# Patient Record
Sex: FEMALE | Race: WHITE | NOT HISPANIC OR LATINO | Employment: OTHER | URBAN - METROPOLITAN AREA
[De-identification: names, ages, dates, MRNs, and addresses within clinical notes are randomized per-mention and may not be internally consistent; named-entity substitution may affect disease eponyms.]

---

## 2017-01-09 ENCOUNTER — E-VISIT (OUTPATIENT)
Dept: FAMILY MEDICINE | Facility: CLINIC | Age: 57
End: 2017-01-09
Payer: COMMERCIAL

## 2017-01-09 DIAGNOSIS — R30.0 DYSURIA: Primary | ICD-10-CM

## 2017-01-09 PROCEDURE — 99444 ZZC PHYSICIAN ONLINE EVALUATION & MANAGEMENT SERVICE: CPT | Performed by: FAMILY MEDICINE

## 2017-01-09 RX ORDER — SULFAMETHOXAZOLE/TRIMETHOPRIM 800-160 MG
1 TABLET ORAL 2 TIMES DAILY
Qty: 6 TABLET | Refills: 0 | Status: SHIPPED | OUTPATIENT
Start: 2017-01-09 | End: 2017-01-12

## 2017-02-20 ENCOUNTER — E-VISIT (OUTPATIENT)
Dept: FAMILY MEDICINE | Facility: CLINIC | Age: 57
End: 2017-02-20
Payer: COMMERCIAL

## 2017-02-20 DIAGNOSIS — R30.0 DYSURIA: Primary | ICD-10-CM

## 2017-02-20 PROCEDURE — 99444 ZZC PHYSICIAN ONLINE EVALUATION & MANAGEMENT SERVICE: CPT | Performed by: FAMILY MEDICINE

## 2017-02-21 DIAGNOSIS — R30.0 DYSURIA: ICD-10-CM

## 2017-02-21 LAB
ALBUMIN UR-MCNC: NEGATIVE MG/DL
APPEARANCE UR: CLEAR
BILIRUB UR QL STRIP: NEGATIVE
COLOR UR AUTO: YELLOW
GLUCOSE UR STRIP-MCNC: NEGATIVE MG/DL
HGB UR QL STRIP: ABNORMAL
KETONES UR STRIP-MCNC: NEGATIVE MG/DL
LEUKOCYTE ESTERASE UR QL STRIP: NEGATIVE
NITRATE UR QL: NEGATIVE
PH UR STRIP: 6 PH (ref 5–7)
RBC #/AREA URNS AUTO: NORMAL /HPF (ref 0–2)
SP GR UR STRIP: <=1.005 (ref 1–1.03)
URN SPEC COLLECT METH UR: ABNORMAL
UROBILINOGEN UR STRIP-ACNC: 0.2 EU/DL (ref 0.2–1)
WBC #/AREA URNS AUTO: NORMAL /HPF (ref 0–2)

## 2017-02-21 PROCEDURE — 81001 URINALYSIS AUTO W/SCOPE: CPT | Performed by: FAMILY MEDICINE

## 2017-02-21 PROCEDURE — 87086 URINE CULTURE/COLONY COUNT: CPT | Performed by: FAMILY MEDICINE

## 2017-02-21 RX ORDER — NITROFURANTOIN 25; 75 MG/1; MG/1
100 CAPSULE ORAL 2 TIMES DAILY
Qty: 14 CAPSULE | Refills: 0 | Status: SHIPPED | OUTPATIENT
Start: 2017-02-21 | End: 2017-05-10

## 2017-02-24 LAB
BACTERIA SPEC CULT: NORMAL
MICRO REPORT STATUS: NORMAL
SPECIMEN SOURCE: NORMAL

## 2017-05-08 ENCOUNTER — TELEPHONE (OUTPATIENT)
Dept: FAMILY MEDICINE | Facility: CLINIC | Age: 57
End: 2017-05-08

## 2017-05-08 NOTE — TELEPHONE ENCOUNTER
RN Tickbite Protocol: Ages 8 and older  Stephanie Trejo is a 56 year old female is being assessed for indication of tick bite.     ASSESSMENT/PLAN:   1.  Patient instructions without treatment.   2.  Education: How to identify a deer tick, Important time frames related to tick bite and Preventing tick bites  3.  Follow-up:   4.  Patient/parent verbalized understanding of plan and is agreeable.     SUBJECTIVE/OBJECTIVE:  Removal of tick that has been attached at least 36 hours? yes   Tick is smaller, redder, no white striping on back and more triangular in shape? yes   Tick was removed within the last 72 hours? yes   Location on body of suspected tick bite: arm   Symptoms:  none  Complicating factors:  Reports: none   Denies: Allergy to Doxycycline, Age less than 8, Breast feeding, Pregnant and Greater than 72 hours since tick removed    Encounter handled by: Nurse Triage.    Lisa Alas RN

## 2017-05-08 NOTE — TELEPHONE ENCOUNTER
Reason for Call:  Other call back    Detailed comments: patient is calling stating she pulled a tiny tick off of herself today, got the tick out and the head out, and it was still moving after pulling it. The spot where it was pulled from, is very red and almost blackish, and is painful. Would like to know what to do.    Phone Number Patient can be reached at: Home number on file [phone] (home)    Best Time: any    Can we leave a detailed message on this number? YES   Koki Everett  [facility] [place] [facility]      Call taken on 5/8/2017 at 10:24 AM by Koki Everett

## 2017-05-10 ENCOUNTER — OFFICE VISIT (OUTPATIENT)
Dept: FAMILY MEDICINE | Facility: CLINIC | Age: 57
End: 2017-05-10
Payer: COMMERCIAL

## 2017-05-10 VITALS
DIASTOLIC BLOOD PRESSURE: 68 MMHG | HEART RATE: 52 BPM | SYSTOLIC BLOOD PRESSURE: 128 MMHG | HEIGHT: 71 IN | WEIGHT: 176 LBS | BODY MASS INDEX: 24.64 KG/M2 | TEMPERATURE: 98.3 F

## 2017-05-10 DIAGNOSIS — S70.361A TICK BITE OF THIGH, RIGHT, INITIAL ENCOUNTER: Primary | ICD-10-CM
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
## 2017-05-10 PROCEDURE — 99212 OFFICE O/P EST SF 10 MIN: CPT | Performed by: NURSE PRACTITIONER

## 2017-05-10 NOTE — TELEPHONE ENCOUNTER
Patient is calling back because her tick bite is now black the size of a tack and has a red ring around it. - It hurts - Please advise

## 2017-05-10 NOTE — PATIENT INSTRUCTIONS
Tick Bite (No Abx)    You have been bitten by a tick. Ticks are small insects that feed on the blood of rodents, rabbits, birds, deer, dogs, and humans. The bite may cause a small local reaction like that of a spider, with a small amount of local redness, itching and slight swelling. Sometimes there is no local reaction.  Most tick bites are harmless, but some ticks carry diseases, such as Lyme disease or [place] spotted fever, that can be passed to people at the time of the bite. Lyme disease is of greatest concern. Right now you have no symptoms of Lyme disease or other serious reaction to the bite. It is important to watch for the warning signs, which could appear weeks to months after the tick bite.  Home care  The following will help you care for your bite at home:  1. If itching is a problem, avoid tight clothing and anything that heats up your skin (hot showers/baths, direct sunlight). This will tend to make the itching worse. Use ice packs to reduce redness as well as itching.  2. An ice pack (ice cubes in a plastic bag, wrapped in a towel) will reduce local areas of redness and itching. Creams containing benzocaine (available over-the-counter) will reduce itching.  3. If large areas of the skin are involved, and if no other antihistamine was given, you can use an antihistamine with diphenhydramine, which is available at drug stores or groceries. It may be used to reduce itching if large areas of the skin are involved. If symptoms continue, seek the advice of your doctor or pharmacist about over-the-counter medications.  Follow-up care  Follow up with your doctor or as advised.  When to seek medical care  Get prompt medical attention if any of the following occur:  Signs of local infection (during next few days)    Increasing redness around the bite site    Increased pain or swelling    Fever over 100.4 F (38.0 C)    Fluid draining from the bite area  Signs of tick-related disease (during next few  weeks to months)    Circular red ring-like rash appears at the bite area within 1 to 3 weeks    Tiredness, fever or chills, nausea or vomiting    Neck pain or stiffness, headache, confusion    Muscle, bone aching    Irregular or rapid heart beat    Joint pain or swelling (especially the knee joint)    Numbness or tingling or weakness in the arms or legs    Weakness on one side of the face    4674-0880 [employer]. [address] [place] [address], [place] [id number]. All rights reserved. This information is not intended as a substitute for professional medical care. Always follow your healthcare professional's instructions.

## 2017-05-10 NOTE — PROGRESS NOTES
SUBJECTIVE:                                                    Stephanie Trejo is a 56 year old female who presents to clinic today for the following health issues:      Bug bite       Duration: 3 days     Description (location/character/radiation): left upper leg - was a very small tick     Intensity:  moderate    Accompanying signs and symptoms: red around site     History (similar episodes/previous evaluation): hx of anaplasmosis     Precipitating or alleviating factors: None    Therapies tried and outcome: None     Was outside  afternoon/evening and found tick attached Monday morning.  Likely on for less than 24 hours.  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    Problem list and histories reviewed & adjusted, as indicated.  Additional history: as documented    Patient Active Problem List   Diagnosis     Hypothyroidism     Other and unspecified disc disorder of lumbar region     Essential hypertension     CAMELIA (obstructive sleep apnea)     CARDIOVASCULAR SCREENING; LDL GOAL LESS THAN 100     Mitral valve regurgitation     Anaplasmosis     Atrial fibrillation (H)     Abnormal uterine bleeding     Mild major depression (H)     CHF (congestive heart failure) (H)     Counseling regarding advanced directives     Past Surgical History:   Procedure Laterality Date     BACK SURGERY       COLONOSCOPY       GYN SURGERY       SOFT TISSUE SURGERY       SURGICAL HISTORY OF -            SURGICAL HISTORY OF -       Laparoscopy     SURGICAL HISTORY OF -       Hernia Repair      SURGICAL HISTORY OF -       Back Fusion T3 - L3       Social History   Substance Use Topics     Smoking status: Never Smoker     Smokeless tobacco: Never Used     Alcohol use No     Family History   Problem Relation Age of Onset     Lipids Mother      Thyroid Disease Mother      Eye Disorder Mother      macular degeneration     Colon Cancer Mother      Anesthesia Reaction Mother       "allergic to some     Hypertension Father      Cardiovascular Father      MI     OSTEOPOROSIS Father      Alzheimer Disease Father      Genitourinary Problems Father      incontinence     Breast Cancer Maternal Grandmother      CEREBROVASCULAR DISEASE Maternal Grandfather      DIABETES Paternal Grandmother      GASTROINTESTINAL DISEASE Sister      colitis     Respiratory Daughter      asthma         Current Outpatient Prescriptions   Medication Sig Dispense Refill     Omega-3 Fatty Acids (FISH OIL OMEGA-3 PO)        PSYLLIUM HUSK PO        Magnesium Oxide (MAG-200 PO)        Probiotic Product (PROBIOTIC DAILY PO)        Misc Natural Products (LUTEIN 20 PO)        temazepam (RESTORIL) 15 MG capsule Take 1-2 tablets by mouth 30 minutes before bed.  Start with 1 tablet and safe to increase to 2 tablets after 3-4 days if residual insomnia. (Patient taking differently: Take 1-2 tablets by mouth 30 minutes before bed.  Start with 1 tablet and safe to increase to 2 tablets after 3-4 days if residual insomnia. Pt taking prn) 60 capsule 5     levothyroxine (SYNTHROID, LEVOTHROID) 125 MCG tablet Take 1 tablet (125 mcg) by mouth daily 90 tablet 3     aspirin 81 MG chewable tablet Take 1 tablet by mouth daily. 90 tablet      MULTIVITAMIN/MINERALS PO one tablet daily       Allergies   Allergen Reactions     Amlodipine Besylate Swelling     Swelling in ankles, Racing pulse also     Ammonia Hives and Swelling     Penicillins Rash     Labs reviewed in EPIC    Reviewed and updated as needed this visit by clinical staff       Reviewed and updated as needed this visit by Provider         ROS:  Constitutional, HEENT, cardiovascular, pulmonary, gi and gu systems are negative, except as otherwise noted.    OBJECTIVE:                                                    /68 (Cuff Size: Adult Regular)  Pulse 52  Temp 98.3  F (36.8  C) (Tympanic)  Ht 5' 11\" (1.803 m)  Wt 176 lb (79.8 kg)  LMP 09/21/2012  BMI 24.55 kg/m2  Body mass " index is 24.55 kg/(m^2).  GENERAL: healthy, alert and no distress  SKIN: tick bite right inner thigh with surrounding erythema, no warmth or bullseye rash  PSYCH: mentation appears normal, affect normal/bright    Diagnostic Test Results:  none      ASSESSMENT/PLAN:                                                      1. Tick bite of thigh, right, initial encounter  No evidence of infection and no bulls eye rash.  With exposure less than 24 hours low risk for Lyme transmission.  Stephanie will monitor symptoms carefully and follow up immediately with any worsening symptoms.    Home care instructions were reviewed with the patient. The risks, benefits and treatment options of prescribed medications or other treatments have been discussed with the patient. The patient verbalized their understanding and should call or follow up if no improvement or if they develop further problems.      Patient Instructions     Tick Bite (No Abx)    You have been bitten by a tick. Ticks are small insects that feed on the blood of rodents, rabbits, birds, deer, dogs, and humans. The bite may cause a small local reaction like that of a spider, with a small amount of local redness, itching and slight swelling. Sometimes there is no local reaction.  Most tick bites are harmless, but some ticks carry diseases, such as Lyme disease or [place] spotted fever, that can be passed to people at the time of the bite. Lyme disease is of greatest concern. Right now you have no symptoms of Lyme disease or other serious reaction to the bite. It is important to watch for the warning signs, which could appear weeks to months after the tick bite.  Home care  The following will help you care for your bite at home:  1. If itching is a problem, avoid tight clothing and anything that heats up your skin (hot showers/baths, direct sunlight). This will tend to make the itching worse. Use ice packs to reduce redness as well as itching.  2. An ice pack (ice  cubes in a plastic bag, wrapped in a towel) will reduce local areas of redness and itching. Creams containing benzocaine (available over-the-counter) will reduce itching.  3. If large areas of the skin are involved, and if no other antihistamine was given, you can use an antihistamine with diphenhydramine, which is available at drug stores or groceries. It may be used to reduce itching if large areas of the skin are involved. If symptoms continue, seek the advice of your doctor or pharmacist about over-the-counter medications.  Follow-up care  Follow up with your doctor or as advised.  When to seek medical care  Get prompt medical attention if any of the following occur:  Signs of local infection (during next few days)    Increasing redness around the bite site    Increased pain or swelling    Fever over 100.4 F (38.0 C)    Fluid draining from the bite area  Signs of tick-related disease (during next few weeks to months)    Circular red ring-like rash appears at the bite area within 1 to 3 weeks    Tiredness, fever or chills, nausea or vomiting    Neck pain or stiffness, headache, confusion    Muscle, bone aching    Irregular or rapid heart beat    Joint pain or swelling (especially the knee joint)    Numbness or tingling or weakness in the arms or legs    Weakness on one side of the face    6198-5093 [employer]. [address] [place] [id number]. All rights reserved. This information is not intended as a substitute for professional medical care. Always follow your healthcare professional's instructions.            [facility]

## 2017-05-10 NOTE — NURSING NOTE
"Chief Complaint   Patient presents with     Insect Bites       Initial /68 (Cuff Size: Adult Regular)  Pulse 52  Temp 98.3  F (36.8  C) (Tympanic)  Ht 5' 11\" (1.803 m)  Wt 176 lb (79.8 kg)  LMP 09/21/2012  BMI 24.55 kg/m2 Estimated body mass index is 24.55 kg/(m^2) as calculated from the following:    Height as of this encounter: 5' 11\" (1.803 m).    Weight as of this encounter: 176 lb (79.8 kg).  Medication Reconciliation: complete    Health Maintenance that is potentially due pending provider review:  PHQ9    n/a      "

## 2017-05-10 NOTE — MR AVS SNAPSHOT
After Visit Summary   5/10/2017    Stephanie Trejo    MRN: [id number]           Patient Information     Date Of Birth          1960        Visit Information        Provider Department      5/10/2017 2:40 PM Mckenzie Fowler APRN [facility]        Care Instructions      Tick Bite (No Abx)    You have been bitten by a tick. Ticks are small insects that feed on the blood of rodents, rabbits, birds, deer, dogs, and humans. The bite may cause a small local reaction like that of a spider, with a small amount of local redness, itching and slight swelling. Sometimes there is no local reaction.  Most tick bites are harmless, but some ticks carry diseases, such as Lyme disease or [place] spotted fever, that can be passed to people at the time of the bite. Lyme disease is of greatest concern. Right now you have no symptoms of Lyme disease or other serious reaction to the bite. It is important to watch for the warning signs, which could appear weeks to months after the tick bite.  Home care  The following will help you care for your bite at home:  1. If itching is a problem, avoid tight clothing and anything that heats up your skin (hot showers/baths, direct sunlight). This will tend to make the itching worse. Use ice packs to reduce redness as well as itching.  2. An ice pack (ice cubes in a plastic bag, wrapped in a towel) will reduce local areas of redness and itching. Creams containing benzocaine (available over-the-counter) will reduce itching.  3. If large areas of the skin are involved, and if no other antihistamine was given, you can use an antihistamine with diphenhydramine, which is available at drug stores or groceries. It may be used to reduce itching if large areas of the skin are involved. If symptoms continue, seek the advice of your doctor or pharmacist about over-the-counter medications.  Follow-up care  Follow up with your doctor or as advised.  When to  seek medical care  Get prompt medical attention if any of the following occur:  Signs of local infection (during next few days)    Increasing redness around the bite site    Increased pain or swelling    Fever over 100.4 F (38.0 C)    Fluid draining from the bite area  Signs of tick-related disease (during next few weeks to months)    Circular red ring-like rash appears at the bite area within 1 to 3 weeks    Tiredness, fever or chills, nausea or vomiting    Neck pain or stiffness, headache, confusion    Muscle, bone aching    Irregular or rapid heart beat    Joint pain or swelling (especially the knee joint)    Numbness or tingling or weakness in the arms or legs    Weakness on one side of the face    6293-1347 [employer]. [address] [place] [address], [place] [address]. All rights reserved. This information is not intended as a substitute for professional medical care. Always follow your healthcare professional's instructions.              Follow-ups after your visit        Who to contact     If you have questions or need follow up information about today's clinic visit or your schedule please contact [facility] directly at [phone].  Normal or non-critical lab and imaging results will be communicated to you by [email or web address], letter or phone within 4 business days after the clinic has received the results. If you do not hear from us within 7 days, please contact the clinic through [employer] or phone. If you have a critical or abnormal lab result, we will notify you by phone as soon as possible.  Submit refill requests through [employer] or call your pharmacy and they will forward the refill request to us. Please allow 3 business days for your refill to be completed.          Additional Information About Your Visit        [employer] Information     [employer] gives you secure access to your electronic health record. If you see a primary care provider, you can also send messages to your care team and  "make appointments. If you have questions, please call your primary care clinic.  If you do not have a primary care provider, please call [phone] and they will assist you.        Care EveryWhere ID     This is your Care EveryWhere ID. This could be used by other organizations to access your [facility] medical records  [id number]        Your Vitals Were     Pulse Temperature Height Last Period BMI (Body Mass Index)       52 98.3  F (36.8  C) (Tympanic) 5' 11\" (1.803 m) 09/21/2012 24.55 kg/m2        Blood Pressure from Last 3 Encounters:   05/10/17 128/68   12/08/16 160/90   11/29/16 130/86    Weight from Last 3 Encounters:   05/10/17 176 lb (79.8 kg)   12/08/16 194 lb (88 kg)   11/29/16 194 lb 6.4 oz (88.2 kg)              Today, you had the following     No orders found for display         Today's Medication Changes          These changes are accurate as of: 5/10/17  3:21 PM.  If you have any questions, ask your nurse or doctor.               These medicines have changed or have updated prescriptions.        Dose/Directions    temazepam 15 MG capsule   Commonly known as:  RESTORIL   This may have changed:  additional instructions   Used for:  Psychophysiological insomnia, Anxiety        Take 1-2 tablets by mouth 30 minutes before bed.  Start with 1 tablet and safe to increase to 2 tablets after 3-4 days if residual insomnia.   Quantity:  60 capsule   Refills:  5         Stop taking these medicines if you haven't already. Please contact your care team if you have questions.     nitrofurantoin (macrocrystal-monohydrate) 100 MG capsule   Commonly known as:  MACROBID   Stopped by:  Mckenzie Fowler APRN CNP                    Primary Care Provider Office Phone # Fax #    Radha Felton [phone]       [facility] [address] [address]        Thank you!     Thank you for choosing [facility]  for your care. Our goal is always to provide " you with excellent care. Hearing back from our patients is one way we can continue to improve our services. Please take a few minutes to complete the written survey that you may receive in the mail after your visit with us. Thank you!             Your Updated Medication List - Protect others around you: Learn how to safely use, store and throw away your medicines at www.disposemymeds.org.          This list is accurate as of: 5/10/17  3:21 PM.  Always use your most recent med list.                   Brand Name Dispense Instructions for use    aspirin 81 MG chewable tablet     90 tablet    Take 1 tablet by mouth daily.       FISH OIL OMEGA-3 PO          levothyroxine 125 MCG tablet    SYNTHROID/LEVOTHROID    90 tablet    Take 1 tablet (125 mcg) by mouth daily       LUTEIN 20 PO          MAG-200 PO          MULTIVITAMIN/MINERALS PO      one tablet daily       PROBIOTIC DAILY PO          PSYLLIUM HUSK PO          temazepam 15 MG capsule    RESTORIL    60 capsule    Take 1-2 tablets by mouth 30 minutes before bed.  Start with 1 tablet and safe to increase to 2 tablets after 3-4 days if residual insomnia.

## 2017-09-18 DIAGNOSIS — F41.9 ANXIETY: ICD-10-CM

## 2017-09-18 DIAGNOSIS — F51.04 PSYCHOPHYSIOLOGICAL INSOMNIA: ICD-10-CM

## 2017-09-18 RX ORDER — TEMAZEPAM 15 MG/1
CAPSULE ORAL
Qty: 60 CAPSULE | Refills: 5 | Status: SHIPPED | OUTPATIENT
Start: 2017-09-18 | End: 2017-11-27

## 2017-09-18 NOTE — TELEPHONE ENCOUNTER
Restoril      Last Written Prescription Date:  12/08/2016  Last Fill Quantity: 60,   # refills: 5  Last Office Visit with G, P or [facility] prescribing provider: 12/08/2016  Future Office visit:       Routing refill request to provider for review/approval because:  Controlled medication

## 2017-11-04 ENCOUNTER — RADIANT APPOINTMENT (OUTPATIENT)
Dept: MAMMOGRAPHY | Facility: CLINIC | Age: 57
End: 2017-11-04
Attending: FAMILY MEDICINE
Payer: COMMERCIAL

## 2017-11-04 DIAGNOSIS — Z12.31 VISIT FOR SCREENING MAMMOGRAM: ICD-10-CM

## 2017-11-04 PROCEDURE — G0202 SCR MAMMO BI INCL CAD: HCPCS | Mod: TC

## 2017-11-13 ENCOUNTER — MYC MEDICAL ADVICE (OUTPATIENT)
Dept: FAMILY MEDICINE | Facility: CLINIC | Age: 57
End: 2017-11-13

## 2017-11-13 DIAGNOSIS — F33.0 MAJOR DEPRESSIVE DISORDER, RECURRENT EPISODE, MILD (H): ICD-10-CM

## 2017-11-13 RX ORDER — BUPROPION HYDROCHLORIDE 150 MG/1
300 TABLET ORAL EVERY MORNING
Qty: 180 TABLET | Refills: 0 | Status: SHIPPED | OUTPATIENT
Start: 2017-11-13 | End: 2017-11-27 | Stop reason: DRUGHIGH

## 2017-11-20 ENCOUNTER — OFFICE VISIT (OUTPATIENT)
Dept: FAMILY MEDICINE | Facility: CLINIC | Age: 57
End: 2017-11-20
Payer: COMMERCIAL

## 2017-11-20 ENCOUNTER — RADIANT APPOINTMENT (OUTPATIENT)
Dept: GENERAL RADIOLOGY | Facility: CLINIC | Age: 57
End: 2017-11-20
Attending: NURSE PRACTITIONER
Payer: COMMERCIAL

## 2017-11-20 VITALS
SYSTOLIC BLOOD PRESSURE: 138 MMHG | WEIGHT: 188 LBS | HEART RATE: 64 BPM | TEMPERATURE: 96.9 F | OXYGEN SATURATION: 96 % | DIASTOLIC BLOOD PRESSURE: 86 MMHG | RESPIRATION RATE: 20 BRPM | BODY MASS INDEX: 26.22 KG/M2

## 2017-11-20 DIAGNOSIS — R05.9 COUGH: ICD-10-CM

## 2017-11-20 DIAGNOSIS — J01.90 ACUTE SINUSITIS WITH SYMPTOMS > 10 DAYS: Primary | ICD-10-CM

## 2017-11-20 PROCEDURE — 71020 XR CHEST 2 VW: CPT

## 2017-11-20 PROCEDURE — 99213 OFFICE O/P EST LOW 20 MIN: CPT | Performed by: NURSE PRACTITIONER

## 2017-11-20 RX ORDER — LEVOFLOXACIN 500 MG/1
500 TABLET, FILM COATED ORAL DAILY
Qty: 7 TABLET | Refills: 0 | Status: SHIPPED | OUTPATIENT
Start: 2017-11-20 | End: 2017-11-27

## 2017-11-20 ASSESSMENT — PAIN SCALES - GENERAL: PAINLEVEL: NO PAIN (0)

## 2017-11-20 NOTE — MR AVS SNAPSHOT
After Visit Summary   11/20/2017    Stephanie Trejo    MRN: [id number]           Patient Information     Date Of Birth          1960        Visit Information        Provider Department      11/20/2017 1:00 PM Zeenat Taylor APRN CNP [facility]        Today's Diagnoses     Cough    -  1    Acute sinusitis with symptoms > 10 days          Care Instructions    Chest x-ray shows no acute infection - will await final radiologist read and update you on any changes     Start Levaquin daily for 7 days     Stop multivitamins or take a few hours apart from antibiotics     Push fluids - lots of rest     Ibuprofen or Tylenol for fever or discomfort    Keep appointment with Dr. Felton or come in sooner if not feeling improvement   Sinusitis (Antibiotic Treatment)    The sinuses are air-filled spaces within the bones of the face. They connect to the inside of the nose. Sinusitis is an inflammation of the tissue lining the sinus cavity. Sinus inflammation can occur during a cold. It can also be due to allergies to pollens and other particles in the air. Sinusitis can cause symptoms of sinus congestion and fullness. A sinus infection causes fever, headache and facial pain. There is often green or yellow drainage from the nose or into the back of the throat (post-nasal drip). You have been given antibiotics to treat this condition.  Home care:    Take the full course of antibiotics as instructed. Do not stop taking them, even if you feel better.    Drink plenty of water, hot tea, and other liquids. This may help thin mucus. It also may promote sinus drainage.    Heat may help soothe painful areas of the face. Use a towel soaked in hot water. Or,  the shower and direct the hot spray onto your face. Using a vaporizer along with a menthol rub at night may also help.     An expectorant containing guaifenesin may help thin the mucus and promote drainage from the  sinuses.    Over-the-counter decongestants may be used unless a similar medicine was prescribed. Nasal sprays work the fastest. Use one that contains phenylephrine or oxymetazoline. First blow the nose gently. Then use the spray. Do not use these medicines more often than directed on the label or symptoms may get worse. You may also use tablets containing pseudoephedrine. Avoid products that combine ingredients, because side effects may be increased. Read labels. You can also ask the pharmacist for help. (NOTE: Persons with high blood pressure should not use decongestants. They can raise blood pressure.)    Over-the-counter antihistamines may help if allergies contributed to your sinusitis.      Do not use nasal rinses or irrigation during an acute sinus infection, unless told to by your health care provider. Rinsing may spread the infection to other sinuses.    Use acetaminophen or ibuprofen to control pain, unless another pain medicine was prescribed. (If you have chronic liver or kidney disease or ever had a stomach ulcer, talk with your doctor before using these medicines. Aspirin should never be used in anyone under 18 years of age who is ill with a fever. It may cause severe liver damage.)    Don't smoke. This can worsen symptoms.  Follow-up care  Follow up with your healthcare provider or our staff if you are not improving within the next week.  When to seek medical advice  Call your healthcare provider if any of these occur:    Facial pain or headache becoming more severe    Stiff neck    Unusual drowsiness or confusion    Swelling of the forehead or eyelids    Vision problems, including blurred or double vision    Fever of 100.4 F (38 C) or higher, or as directed by your healthcare provider    Seizure    Breathing problems    Symptoms not resolving within 10 days  Date Last Reviewed: 4/13/2015 2000-2017 [employer]. [address] [place] [address], [place] [address]. All rights reserved. This  information is not intended as a substitute for professional medical care. Always follow your healthcare professional's instructions.                Follow-ups after your visit        Your next 10 appointments already scheduled     Nov 27, 2017  9:40 AM CST   [facility]    [address] [place] [id number]   [phone]              Who to contact     If you have questions or need follow up information about today's clinic visit or your schedule please contact [facility] directly at [phone].  Normal or non-critical lab and imaging results will be communicated to you by [employer], letter or phone within 4 business days after the clinic has received the results. If you do not hear from us within 7 days, please contact the clinic through [employer] or phone. If you have a critical or abnormal lab result, we will notify you by phone as soon as possible.  Submit refill requests through [employer] or call your pharmacy and they will forward the refill request to us. Please allow 3 business days for your refill to be completed.          Additional Information About Your Visit        MyChart Information     [employer] gives you secure access to your electronic health record. If you see a primary care provider, you can also send messages to your care team and make appointments. If you have questions, please call your primary care clinic.  If you do not have a primary care provider, please call [phone] and they will assist you.        Care EveryWhere ID     This is your Care EveryWhere ID. This could be used by other organizations to access your [facility] medical records  [id number]        Your Vitals Were     Pulse Temperature Respirations Last Period Pulse Oximetry BMI (Body Mass Index)    64 96.9  F (36.1  C) (Tympanic) 20 09/21/2012 96% 26.22 kg/m2       Blood Pressure from Last 3 Encounters:   11/20/17  138/86   05/10/17 128/68   12/08/16 160/90    Weight from Last 3 Encounters:   11/20/17 188 lb (85.3 kg)   05/10/17 176 lb (79.8 kg)   12/08/16 194 lb (88 kg)                 Today's Medication Changes          These changes are accurate as of: 11/20/17  2:01 PM.  If you have any questions, ask your nurse or doctor.               Start taking these medicines.        Dose/Directions    levofloxacin 500 MG tablet   Commonly known as:  LEVAQUIN   Used for:  Acute sinusitis with symptoms > 10 days   Started by:  Zeenat Taylor APRN CNP        Dose:  500 mg   Take 1 tablet (500 mg) by mouth daily for 7 days   Quantity:  7 tablet   Refills:  0         These medicines have changed or have updated prescriptions.        Dose/Directions    buPROPion 150 MG 24 hr tablet   Commonly known as:  WELLBUTRIN XL   This may have changed:  additional instructions   Used for:  Major depressive disorder, recurrent episode, mild (H)        Dose:  300 mg   Take 2 tablets (300 mg) by mouth every morning   Quantity:  180 tablet   Refills:  0            Where to get your medicines      These medications were sent to [place] [address]     Phone:  [phone]     levofloxacin 500 MG tablet                Primary Care Provider Office Phone # Fax #    Radha Felton [phone]       [address] [place], [place] [address]        Equal Access to Services     [facility] Center[facility] AH: Hadii giovanna buchanano [place], waaxda luqadaha, qaybta kaalmada adeegyada, virgen avelar. So [facility] [phone].    ATENCIÓN: Si habla español, tiene a ross disposición servicios gratuitos de asistencia lingüística. Llame al [phone].    We comply with applicable federal civil rights laws and [place] laws. We do not discriminate on the basis of race, color, national origin, age, disability, sex, sexual orientation, or gender  identity.            Thank you!     Thank you for choosing [facility]  for your care. Our goal is always to provide you with excellent care. Hearing back from our patients is one way we can continue to improve our services. Please take a few minutes to complete the written survey that you may receive in the mail after your visit with us. Thank you!             Your Updated Medication List - Protect others around you: Learn how to safely use, store and throw away your medicines at www.disposemymeds.org.          This list is accurate as of: 11/20/17  2:01 PM.  Always use your most recent med list.                   Brand Name Dispense Instructions for use Diagnosis    aspirin 81 MG chewable tablet     90 tablet    Take 1 tablet by mouth daily.    Mild major depression (H)       buPROPion 150 MG 24 hr tablet    WELLBUTRIN XL    180 tablet    Take 2 tablets (300 mg) by mouth every morning    Major depressive disorder, recurrent episode, mild (H)       FISH OIL OMEGA-3 PO           levofloxacin 500 MG tablet    LEVAQUIN    7 tablet    Take 1 tablet (500 mg) by mouth daily for 7 days    Acute sinusitis with symptoms > 10 days       levothyroxine 125 MCG tablet    SYNTHROID/LEVOTHROID    90 tablet    Take 1 tablet (125 mcg) by mouth daily    Hypothyroidism, unspecified type       LUTEIN 20 PO           MAG-200 PO           MULTIVITAMIN/MINERALS PO      one tablet daily        PROBIOTIC DAILY PO           PSYLLIUM HUSK PO           temazepam 15 MG capsule    RESTORIL    60 capsule    Take 1-2 tablets by mouth 30 minutes before bed.  Start with 1 tablet and safe to increase to 2 tablets after 3-4 days if residual insomnia. Pt taking prn    Psychophysiological insomnia, Anxiety

## 2017-11-20 NOTE — PATIENT INSTRUCTIONS
Chest x-ray shows no acute infection - will await final radiologist read and update you on any changes     Start Levaquin daily for 7 days     Stop multivitamins or take a few hours apart from antibiotics     Push fluids - lots of rest     Ibuprofen or Tylenol for fever or discomfort    Keep appointment with Dr. Felton or come in sooner if not feeling improvement   Sinusitis (Antibiotic Treatment)    The sinuses are air-filled spaces within the bones of the face. They connect to the inside of the nose. Sinusitis is an inflammation of the tissue lining the sinus cavity. Sinus inflammation can occur during a cold. It can also be due to allergies to pollens and other particles in the air. Sinusitis can cause symptoms of sinus congestion and fullness. A sinus infection causes fever, headache and facial pain. There is often green or yellow drainage from the nose or into the back of the throat (post-nasal drip). You have been given antibiotics to treat this condition.  Home care:    Take the full course of antibiotics as instructed. Do not stop taking them, even if you feel better.    Drink plenty of water, hot tea, and other liquids. This may help thin mucus. It also may promote sinus drainage.    Heat may help soothe painful areas of the face. Use a towel soaked in hot water. Or,  the shower and direct the hot spray onto your face. Using a vaporizer along with a menthol rub at night may also help.     An expectorant containing guaifenesin may help thin the mucus and promote drainage from the sinuses.    Over-the-counter decongestants may be used unless a similar medicine was prescribed. Nasal sprays work the fastest. Use one that contains phenylephrine or oxymetazoline. First blow the nose gently. Then use the spray. Do not use these medicines more often than directed on the label or symptoms may get worse. You may also use tablets containing pseudoephedrine. Avoid products that combine ingredients, because side  effects may be increased. Read labels. You can also ask the pharmacist for help. (NOTE: Persons with high blood pressure should not use decongestants. They can raise blood pressure.)    Over-the-counter antihistamines may help if allergies contributed to your sinusitis.      Do not use nasal rinses or irrigation during an acute sinus infection, unless told to by your health care provider. Rinsing may spread the infection to other sinuses.    Use acetaminophen or ibuprofen to control pain, unless another pain medicine was prescribed. (If you have chronic liver or kidney disease or ever had a stomach ulcer, talk with your doctor before using these medicines. Aspirin should never be used in anyone under 18 years of age who is ill with a fever. It may cause severe liver damage.)    Don't smoke. This can worsen symptoms.  Follow-up care  Follow up with your healthcare provider or our staff if you are not improving within the next week.  When to seek medical advice  Call your healthcare provider if any of these occur:    Facial pain or headache becoming more severe    Stiff neck    Unusual drowsiness or confusion    Swelling of the forehead or eyelids    Vision problems, including blurred or double vision    Fever of 100.4 F (38 C) or higher, or as directed by your healthcare provider    Seizure    Breathing problems    Symptoms not resolving within 10 days  Date Last Reviewed: 4/13/2015 2000-2017 [employer]. [address] [place] [address], [place] [address]. All rights reserved. This information is not intended as a substitute for professional medical care. Always follow your healthcare professional's instructions.

## 2017-11-20 NOTE — NURSING NOTE
"Chief Complaint   Patient presents with     URI       Initial /86 (BP Location: Right arm, Patient Position: Chair, Cuff Size: Adult Regular)  Pulse 64  Temp 96.9  F (36.1  C) (Tympanic)  Resp 20  Wt 188 lb (85.3 kg)  LMP 09/21/2012  SpO2 96%  BMI 26.22 kg/m2 Estimated body mass index is 26.22 kg/(m^2) as calculated from the following:    Height as of 5/10/17: 5' 11\" (1.803 m).    Weight as of this encounter: 188 lb (85.3 kg).  Medication Reconciliation: complete    Health Maintenance that is potentially due pending provider review:  NONE and PHQ9    Ananya Gonzalez MA  1:18 PM 11/20/2017  .        "

## 2017-11-20 NOTE — PROGRESS NOTES
SUBJECTIVE:   Stephanie Trejo is a 57 year old female who presents to clinic today for the following health issues:      ENT Symptoms             Symptoms: cc Present Absent Comment   Fever/Chills   x    Fatigue  x     Muscle Aches  x  Everyday thing    Eye Irritation   x    Sneezing   x    Nasal Kevin/Drg   x    Sinus Pressure/Pain  x     Loss of smell   x    Dental pain   x    Sore Throat  x  From coughing   Swollen Glands   x    Ear Pain/Fullness   x    Cough  x  Dry   Wheeze   x    Chest Pain  x  tightness started last night   Shortness of breath  x     Rash   x    Other         Symptom duration:  2 weeks ago, heaviness yesterday, history of pneumonia    Symptom severity:  moderate, no voice    Treatments tried:  Sudafed - relieves, doesn't take away,    Contacts:  None        Lots of drainage in the back of the throat   Prone to pneumonia - gets almost yearly per patient       Problem list and histories reviewed & adjusted, as indicated.  Additional history: as documented    Patient Active Problem List   Diagnosis     Hypothyroidism     Other and unspecified disc disorder of lumbar region     Essential hypertension     CAMELIA (obstructive sleep apnea)     CARDIOVASCULAR SCREENING; LDL GOAL LESS THAN 100     Mitral valve regurgitation     Anaplasmosis     Atrial fibrillation (H)     Abnormal uterine bleeding     Mild major depression (H)     CHF (congestive heart failure) (H)     Counseling regarding advanced directives     Past Surgical History:   Procedure Laterality Date     BACK SURGERY       COLONOSCOPY       GYN SURGERY       SOFT TISSUE SURGERY       SURGICAL HISTORY OF -            SURGICAL HISTORY OF -       Laparoscopy     SURGICAL HISTORY OF -       Hernia Repair      SURGICAL HISTORY OF -       Back Fusion T3 - L3       Social History   Substance Use Topics     Smoking status: Never Smoker     Smokeless tobacco: Never Used     Alcohol use No     Family History   Problem Relation  Age of Onset     Lipids Mother      Thyroid Disease Mother      Eye Disorder Mother      macular degeneration     Colon Cancer Mother      Anesthesia Reaction Mother      allergic to some     Hypertension Father      Cardiovascular Father      MI     OSTEOPOROSIS Father      Alzheimer Disease Father      Genitourinary Problems Father      incontinence     Breast Cancer Maternal Grandmother      CEREBROVASCULAR DISEASE Maternal Grandfather      DIABETES Paternal Grandmother      GASTROINTESTINAL DISEASE Sister      colitis     Respiratory Daughter      asthma         Current Outpatient Prescriptions   Medication Sig Dispense Refill     levofloxacin (LEVAQUIN) 500 MG tablet Take 1 tablet (500 mg) by mouth daily for 7 days 7 tablet 0     buPROPion (WELLBUTRIN XL) 150 MG 24 hr tablet Take 2 tablets (300 mg) by mouth every morning (Patient taking differently: Take 300 mg by mouth every morning Insurance would only cover 300 mg tabs so pt takes one tab every morning of 300mg.) 180 tablet 0     temazepam (RESTORIL) 15 MG capsule Take 1-2 tablets by mouth 30 minutes before bed.  Start with 1 tablet and safe to increase to 2 tablets after 3-4 days if residual insomnia. Pt taking prn 60 capsule 5     Omega-3 Fatty Acids (FISH OIL OMEGA-3 PO)        PSYLLIUM HUSK PO        Magnesium Oxide (MAG-200 PO)        Probiotic Product (PROBIOTIC DAILY PO)        Misc Natural Products (LUTEIN 20 PO)        levothyroxine (SYNTHROID, LEVOTHROID) 125 MCG tablet Take 1 tablet (125 mcg) by mouth daily 90 tablet 3     aspirin 81 MG chewable tablet Take 1 tablet by mouth daily. 90 tablet      MULTIVITAMIN/MINERALS PO one tablet daily       Allergies   Allergen Reactions     Amlodipine Besylate Swelling     Swelling in ankles, Racing pulse also     Ammonia Hives and Swelling     Penicillins Rash         Reviewed and updated as needed this visit by clinical staffTobacco  Allergies  Meds  Problems  Med Hx  Surg Hx  Fam Hx  Soc Hx         Reviewed and updated as needed this visit by Provider  Tobacco  Allergies  Meds  Problems  Med Hx  Surg Hx  Fam Hx  Soc Hx          ROS:  Constitutional, HEENT, cardiovascular, pulmonary, gi and gu systems are negative, except as otherwise noted.      OBJECTIVE:   /86 (BP Location: Right arm, Patient Position: Chair, Cuff Size: Adult Regular)  Pulse 64  Temp 96.9  F (36.1  C) (Tympanic)  Resp 20  Wt 188 lb (85.3 kg)  LMP 09/21/2012  SpO2 96%  BMI 26.22 kg/m2  Body mass index is 26.22 kg/(m^2).  GENERAL APPEARANCE: healthy, alert and no distress  EYES: Eyes grossly normal to inspection, PERRL and conjunctiva/corneas- conjunctival injection OU  HENT: ear canals normal and TM's with serous fluid bilateral, nose and mouth without ulcers or lesions, frontal sinus tenderness bilateral and post nasal drainage noted  NECK: no adenopathy, no asymmetry, masses, or scars and thyroid normal to palpation  RESP: lungs clear to auscultation - no rales, rhonchi or wheezes  CV: regular rates and rhythm, normal S1 S2, no S3 or S4 and no murmur, click or rub  ABDOMEN: soft, nontender, without hepatosplenomegaly or masses and bowel sounds normal    Diagnostic Test Results:  CXR - independently ready by KAY Arroyo CNP demonstrates no acute abnormality     ASSESSMENT/PLAN:     1. Acute sinusitis with symptoms > 10 days  Antibiotics chosen 2/2 allergies and to cover respiratory as well, although no clear acute process on chest x-ray   - levofloxacin (LEVAQUIN) 500 MG tablet; Take 1 tablet (500 mg) by mouth daily for 7 days  Dispense: 7 tablet; Refill: 0    2. Cough  X 2 weeks, worsening.   - XR Chest 2 Views; Future    Patient Instructions   Chest x-ray shows no acute infection - will await final radiologist read and update you on any changes     Start Levaquin daily for 7 days     Stop multivitamins or take a few hours apart from antibiotics     Push fluids - lots of rest     Ibuprofen or Tylenol for fever  or discomfort    Keep appointment with Dr. Felton or come in sooner if not feeling improvement   Sinusitis (Antibiotic Treatment)    The sinuses are air-filled spaces within the bones of the face. They connect to the inside of the nose. Sinusitis is an inflammation of the tissue lining the sinus cavity. Sinus inflammation can occur during a cold. It can also be due to allergies to pollens and other particles in the air. Sinusitis can cause symptoms of sinus congestion and fullness. A sinus infection causes fever, headache and facial pain. There is often green or yellow drainage from the nose or into the back of the throat (post-nasal drip). You have been given antibiotics to treat this condition.  Home care:    Take the full course of antibiotics as instructed. Do not stop taking them, even if you feel better.    Drink plenty of water, hot tea, and other liquids. This may help thin mucus. It also may promote sinus drainage.    Heat may help soothe painful areas of the face. Use a towel soaked in hot water. Or,  the shower and direct the hot spray onto your face. Using a vaporizer along with a menthol rub at night may also help.     An expectorant containing guaifenesin may help thin the mucus and promote drainage from the sinuses.    Over-the-counter decongestants may be used unless a similar medicine was prescribed. Nasal sprays work the fastest. Use one that contains phenylephrine or oxymetazoline. First blow the nose gently. Then use the spray. Do not use these medicines more often than directed on the label or symptoms may get worse. You may also use tablets containing pseudoephedrine. Avoid products that combine ingredients, because side effects may be increased. Read labels. You can also ask the pharmacist for help. (NOTE: Persons with high blood pressure should not use decongestants. They can raise blood pressure.)    Over-the-counter antihistamines may help if allergies contributed to your sinusitis.       Do not use nasal rinses or irrigation during an acute sinus infection, unless told to by your health care provider. Rinsing may spread the infection to other sinuses.    Use acetaminophen or ibuprofen to control pain, unless another pain medicine was prescribed. (If you have chronic liver or kidney disease or ever had a stomach ulcer, talk with your doctor before using these medicines. Aspirin should never be used in anyone under 18 years of age who is ill with a fever. It may cause severe liver damage.)    Don't smoke. This can worsen symptoms.  Follow-up care  Follow up with your healthcare provider or our staff if you are not improving within the next week.  When to seek medical advice  Call your healthcare provider if any of these occur:    Facial pain or headache becoming more severe    Stiff neck    Unusual drowsiness or confusion    Swelling of the forehead or eyelids    Vision problems, including blurred or double vision    Fever of 100.4 F (38 C) or higher, or as directed by your healthcare provider    Seizure    Breathing problems    Symptoms not resolving within 10 days  Date Last Reviewed: 4/13/2015 2000-2017 [employer]. [address] [place] [address]. All rights reserved. This information is not intended as a substitute for professional medical care. Always follow your healthcare professional's instructions.            [facility]

## 2017-11-27 ENCOUNTER — OFFICE VISIT (OUTPATIENT)
Dept: FAMILY MEDICINE | Facility: CLINIC | Age: 57
End: 2017-11-27
Payer: COMMERCIAL

## 2017-11-27 VITALS
TEMPERATURE: 98.1 F | SYSTOLIC BLOOD PRESSURE: 118 MMHG | HEART RATE: 72 BPM | DIASTOLIC BLOOD PRESSURE: 72 MMHG | BODY MASS INDEX: 26.05 KG/M2 | WEIGHT: 186.8 LBS

## 2017-11-27 DIAGNOSIS — F32.0 MILD MAJOR DEPRESSION (H): Primary | ICD-10-CM

## 2017-11-27 DIAGNOSIS — I10 ESSENTIAL HYPERTENSION: ICD-10-CM

## 2017-11-27 DIAGNOSIS — F41.9 ANXIETY: ICD-10-CM

## 2017-11-27 DIAGNOSIS — E03.9 HYPOTHYROIDISM, UNSPECIFIED TYPE: ICD-10-CM

## 2017-11-27 DIAGNOSIS — F51.04 PSYCHOPHYSIOLOGICAL INSOMNIA: ICD-10-CM

## 2017-11-27 LAB
ALBUMIN SERPL-MCNC: 3.8 G/DL (ref 3.4–5)
ALP SERPL-CCNC: 115 U/L (ref 40–150)
ALT SERPL W P-5'-P-CCNC: 29 U/L (ref 0–50)
ANION GAP SERPL CALCULATED.3IONS-SCNC: 8 MMOL/L (ref 3–14)
AST SERPL W P-5'-P-CCNC: 27 U/L (ref 0–45)
BILIRUB DIRECT SERPL-MCNC: 0.1 MG/DL (ref 0–0.2)
BILIRUB SERPL-MCNC: 0.5 MG/DL (ref 0.2–1.3)
BUN SERPL-MCNC: 18 MG/DL (ref 7–30)
CALCIUM SERPL-MCNC: 9.3 MG/DL (ref 8.5–10.1)
CHLORIDE SERPL-SCNC: 107 MMOL/L (ref 94–109)
CHOLEST SERPL-MCNC: 224 MG/DL
CO2 SERPL-SCNC: 28 MMOL/L (ref 20–32)
CREAT SERPL-MCNC: 1.01 MG/DL (ref 0.52–1.04)
GFR SERPL CREATININE-BSD FRML MDRD: 56 ML/MIN/1.7M2
GLUCOSE SERPL-MCNC: 60 MG/DL (ref 70–99)
HDLC SERPL-MCNC: 85 MG/DL
LDLC SERPL CALC-MCNC: 128 MG/DL
NONHDLC SERPL-MCNC: 139 MG/DL
POTASSIUM SERPL-SCNC: 4 MMOL/L (ref 3.4–5.3)
PROT SERPL-MCNC: 7.3 G/DL (ref 6.8–8.8)
SODIUM SERPL-SCNC: 143 MMOL/L (ref 133–144)
TRIGL SERPL-MCNC: 56 MG/DL
TSH SERPL DL<=0.005 MIU/L-ACNC: 1.65 MU/L (ref 0.4–4)

## 2017-11-27 PROCEDURE — 80048 BASIC METABOLIC PNL TOTAL CA: CPT | Performed by: FAMILY MEDICINE

## 2017-11-27 PROCEDURE — 99214 OFFICE O/P EST MOD 30 MIN: CPT | Performed by: FAMILY MEDICINE

## 2017-11-27 PROCEDURE — 84443 ASSAY THYROID STIM HORMONE: CPT | Performed by: FAMILY MEDICINE

## 2017-11-27 PROCEDURE — 80061 LIPID PANEL: CPT | Performed by: FAMILY MEDICINE

## 2017-11-27 PROCEDURE — 36415 COLL VENOUS BLD VENIPUNCTURE: CPT | Performed by: FAMILY MEDICINE

## 2017-11-27 PROCEDURE — 80076 HEPATIC FUNCTION PANEL: CPT | Performed by: FAMILY MEDICINE

## 2017-11-27 RX ORDER — BUPROPION HYDROCHLORIDE 150 MG/1
150 TABLET ORAL EVERY MORNING
Qty: 30 TABLET | Refills: 1 | Status: SHIPPED | OUTPATIENT
Start: 2017-11-27 | End: 2018-01-20

## 2017-11-27 RX ORDER — TEMAZEPAM 15 MG/1
15 CAPSULE ORAL
Qty: 60 CAPSULE | Refills: 5 | COMMUNITY
Start: 2017-11-27 | End: 2018-03-20

## 2017-11-27 RX ORDER — LEVOTHYROXINE SODIUM 125 UG/1
125 TABLET ORAL DAILY
Qty: 90 TABLET | Refills: 3 | Status: CANCELLED | OUTPATIENT
Start: 2017-11-27

## 2017-11-27 ASSESSMENT — ANXIETY QUESTIONNAIRES
1. FEELING NERVOUS, ANXIOUS, OR ON EDGE: SEVERAL DAYS
7. FEELING AFRAID AS IF SOMETHING AWFUL MIGHT HAPPEN: NOT AT ALL
2. NOT BEING ABLE TO STOP OR CONTROL WORRYING: NOT AT ALL
6. BECOMING EASILY ANNOYED OR IRRITABLE: MORE THAN HALF THE DAYS
5. BEING SO RESTLESS THAT IT IS HARD TO SIT STILL: NOT AT ALL
3. WORRYING TOO MUCH ABOUT DIFFERENT THINGS: NOT AT ALL
GAD7 TOTAL SCORE: 3

## 2017-11-27 ASSESSMENT — PATIENT HEALTH QUESTIONNAIRE - PHQ9
SUM OF ALL RESPONSES TO PHQ QUESTIONS 1-9: 7
5. POOR APPETITE OR OVEREATING: NOT AT ALL

## 2017-11-27 NOTE — NURSING NOTE
"Chief Complaint   Patient presents with     Thyroid Disease     Heart Failure       Initial /72 (BP Location: Right arm, Patient Position: Chair, Cuff Size: Adult Large)  Pulse 72  Temp 98.1  F (36.7  C) (Tympanic)  Wt 186 lb 12.8 oz (84.7 kg)  LMP 09/21/2012  BMI 26.05 kg/m2 Estimated body mass index is 26.05 kg/(m^2) as calculated from the following:    Height as of 5/10/17: 5' 11\" (1.803 m).    Weight as of this encounter: 186 lb 12.8 oz (84.7 kg).  Medication Reconciliation: complete    Health Maintenance that is potentially due pending provider review:  NONE    n/a    Is there anyone who you would like to be able to receive your results? No  If yes have patient fill out VERA    "

## 2017-11-27 NOTE — MR AVS SNAPSHOT
After Visit Summary   11/27/2017    Stephanie Trejo    MRN: [id number]           Patient Information     Date Of Birth          1960        Visit Information        Provider Department      11/27/2017 9:40 AM Radha Felton MD [facility]        Today's Diagnoses     Mild major depression (H)    -  1    Psychophysiological insomnia        Anxiety        Hypothyroidism, unspecified type          Care Instructions    Change dose of wellbutrin to 150mg     Let me know in 1-2 weeks how you are doing     labs today           Follow-ups after your visit        Who to contact     If you have questions or need follow up information about today's clinic visit or your schedule please contact [facility] directly at [phone].  Normal or non-critical lab and imaging results will be communicated to you by Partneredhart, letter or phone within 4 business days after the clinic has received the results. If you do not hear from us within 7 days, please contact the clinic through Partneredhart or phone. If you have a critical or abnormal lab result, we will notify you by phone as soon as possible.  Submit refill requests through [employer] or call your pharmacy and they will forward the refill request to us. Please allow 3 business days for your refill to be completed.          Additional Information About Your Visit        MyChart Information     [employer] gives you secure access to your electronic health record. If you see a primary care provider, you can also send messages to your care team and make appointments. If you have questions, please call your primary care clinic.  If you do not have a primary care provider, please call [phone] and they will assist you.        Care EveryWhere ID     This is your Care EveryWhere ID. This could be used by other organizations to access your [place] medical records  [id number]        Your Vitals Were     Pulse Temperature Last Period  BMI (Body Mass Index)          72 98.1  F (36.7  C) (Tympanic) 09/21/2012 26.05 kg/m2         Blood Pressure from Last 3 Encounters:   11/27/17 118/72   11/20/17 138/86   05/10/17 128/68    Weight from Last 3 Encounters:   11/27/17 186 lb 12.8 oz (84.7 kg)   11/20/17 188 lb (85.3 kg)   05/10/17 176 lb (79.8 kg)              We Performed the Following     BASIC METABOLIC PANEL     DEPRESSION ACTION PLAN (DAP)     Hepatic panel     Lipid panel reflex to direct LDL Fasting     TSH WITH FREE T4 REFLEX          Today's Medication Changes          These changes are accurate as of: 11/27/17 10:23 AM.  If you have any questions, ask your nurse or doctor.               These medicines have changed or have updated prescriptions.        Dose/Directions    buPROPion 150 MG 24 hr tablet   Commonly known as:  WELLBUTRIN XL   This may have changed:  how much to take   Used for:  Mild major depression (H)   Changed by:  Radha Felton MD        Dose:  150 mg   Take 1 tablet (150 mg) by mouth every morning   Quantity:  30 tablet   Refills:  1       temazepam 15 MG capsule   Commonly known as:  RESTORIL   This may have changed:    - how much to take  - how to take this  - when to take this  - reasons to take this  - additional instructions   Used for:  Psychophysiological insomnia, Anxiety   Changed by:  Radha Felton MD        Dose:  15 mg   Take 1 capsule (15 mg) by mouth nightly as needed for sleep   Quantity:  60 capsule   Refills:  5            Where to get your medicines      These medications were sent to [place] [id number]     Phone:  [phone]     buPROPion 150 MG 24 hr tablet                Primary Care Provider Office Phone # Fax #    Radha Felton [phone]       [address] [place] [address]        Equal Access to Services     CRUZ OLMEDO AH: Hubert Bello,  waaxda anibal, qaybta kanicholas rondon, virgen porfirioin hayaan yolnadeshona misbahsahra laStevengloria ah. So [facility] [phone].    ATENCIÓN: Si garett hall, tiene a ross disposición servicios gratuitos de asistencia lingüística. Izabella al [phone].    We comply with applicable federal civil rights laws and [place] laws. We do not discriminate on the basis of race, color, national origin, age, disability, sex, sexual orientation, or gender identity.            Thank you!     Thank you for choosing [facility]  for your care. Our goal is always to provide you with excellent care. Hearing back from our patients is one way we can continue to improve our services. Please take a few minutes to complete the written survey that you may receive in the mail after your visit with us. Thank you!             Your Updated Medication List - Protect others around you: Learn how to safely use, store and throw away your medicines at www.disposemymeds.org.          This list is accurate as of: 11/27/17 10:23 AM.  Always use your most recent med list.                   Brand Name Dispense Instructions for use Diagnosis    aspirin 81 MG chewable tablet     90 tablet    Take 1 tablet by mouth daily.    Mild major depression (H)       buPROPion 150 MG 24 hr tablet    WELLBUTRIN XL    30 tablet    Take 1 tablet (150 mg) by mouth every morning    Mild major depression (H)       FISH OIL OMEGA-3 PO           levothyroxine 125 MCG tablet    SYNTHROID/LEVOTHROID    90 tablet    Take 1 tablet (125 mcg) by mouth daily    Hypothyroidism, unspecified type       LUTEIN 20 PO           MAG-200 PO           MULTIVITAMIN/MINERALS PO      one tablet daily        PROBIOTIC DAILY PO           PSYLLIUM HUSK PO           temazepam 15 MG capsule    RESTORIL    60 capsule    Take 1 capsule (15 mg) by mouth nightly as needed for sleep    Psychophysiological insomnia, Anxiety

## 2017-11-27 NOTE — LETTER
My Depression Action Plan  Name: Stephanie Trejo   Date of Birth 1960  Date: 11/27/2017    My doctor: Radha Felton   My clinic: [address] [address] [phone]          GREEN    ZONE   Good Control    What it looks like:     Things are going generally well. You have normal up s and down s. You may even feel depressed from time to time, but bad moods usually last less than a day.   What you need to do:  1. Continue to care for yourself (see self care plan)  2. Check your depression survival kit and update it as needed  3. Follow your physician s recommendations including any medication.  4. Do not stop taking medication unless you consult with your physician first.           YELLOW         ZONE Getting Worse    What it looks like:     Depression is starting to interfere with your life.     It may be hard to get out of bed; you may be starting to isolate yourself from others.    Symptoms of depression are starting to last most all day and this has happened for several days.     You may have suicidal thoughts but they are not constant.   What you need to do:     1. Call your care team, your response to treatment will improve if you keep your care team informed of your progress. Yellow periods are signs an adjustment may need to be made.     2. Continue your self-care, even if you have to fake it!    3. Talk to someone in your support network    4. Open up your depression survival kit           RED    ZONE Medical Alert - Get Help    What it looks like:     Depression is seriously interfering with your life.     You may experience these or other symptoms: You can t get out of bed most days, can t work or engage in other necessary activities, you have trouble taking care of basic hygiene, or basic responsibilities, thoughts of suicide or death that will not go away, self-injurious behavior.     What you need to do:  1. Call your care  team and request a same-day appointment. If they are not available (weekends or after hours) call your local crisis line, emergency room or 911.      Electronically signed by: Rayne Pagan, November 27, 2017    Depression Self Care Plan / Survival Kit    Self-Care for Depression  Here s the deal. Your body and mind are really not as separate as most people think.  What you do and think affects how you feel and how you feel influences what you do and think. This means if you do things that people who feel good do, it will help you feel better.  Sometimes this is all it takes.  There is also a place for medication and therapy depending on how severe your depression is, so be sure to consult with your medical provider and/ or Behavioral Health Consultant if your symptoms are worsening or not improving.     In order to better manage my stress, I will:    Exercise  Get some form of exercise, every day. This will help reduce pain and release endorphins, the  feel good  chemicals in your brain. This is almost as good as taking antidepressants!  This is not the same as joining a gym and then never going! (they count on that by the way ) It can be as simple as just going for a walk or doing some gardening, anything that will get you moving.      Hygiene   Maintain good hygiene (Get out of bed in the morning, Make your bed, Brush your teeth, Take a shower, and Get dressed like you were going to work, even if you are unemployed).  If your clothes don't fit try to get ones that do.    Diet  I will strive to eat foods that are good for me, drink plenty of water, and avoid excessive sugar, caffeine, alcohol, and other mood-altering substances.  Some foods that are helpful in depression are: complex carbohydrates, B vitamins, flaxseed, fish or fish oil, fresh fruits and vegetables.    Psychotherapy  I agree to participate in Individual Therapy (if recommended).    Medication  If prescribed medications, I agree to take them.   Missing doses can result in serious side effects.  I understand that drinking alcohol, or other illicit drug use, may cause potential side effects.  I will not stop my medication abruptly without first discussing it with my provider.    Staying Connected With Others  I will stay in touch with my friends, family members, and my primary care provider/team.    Use your imagination  Be creative.  We all have a creative side; it doesn t matter if it s oil painting, sand castles, or mud pies! This will also kick up the endorphins.    Witness Beauty  (AKA stop and smell the roses) Take a look outside, even in mid-winter. Notice colors, textures. Watch the squirrels and birds.     Service to others  Be of service to others.  There is always someone else in need.  By helping others we can  get out of ourselves  and remember the really important things.  This also provides opportunities for practicing all the other parts of the program.    Humor  Laugh and be silly!  Adjust your TV habits for less news and crime-drama and more comedy.    Control your stress  Try breathing deep, massage therapy, biofeedback, and meditation. Find time to relax each day.     My support system    Clinic Contact:  Phone number:    Contact 1:  Phone number:    Contact 2:  Phone number:    Sabianist/:  Phone number:    Therapist:  Phone number:    Local crisis center:    Phone number:    Other community support:  Phone number:

## 2017-11-27 NOTE — PROGRESS NOTES
SUBJECTIVE:   Stephanie Trejo is a 57 year old female who presents to clinic today for the following health issues:      Hypertension Follow-up      Outpatient blood pressures are not being checked.    Low Salt Diet: low salt    Heart Failure Follow-up    Symptoms:    Shortness of breath: none    Lower extremity edema: none    Chest pain: No    Using more pillows than normal: No    Cough at night: No    Weight:    Checking weight daily: No    Weight change: none    Cardiology visits, ER/UC, or hospital admissions since last visit: None    Medication side effects: none    Hypothyroidism Follow-up      Since last visit, patient describes the following symptoms: Weight stable, no hair loss, no skin changes,  constipation, no loose stools          Amount of exercise or physical activity: None    Problems taking medications regularly: No    Medication side effects: none  Diet: low salt      Hypertension Follow-up      Outpatient blood pressures are not being checked.    Low Salt Diet: no added salt          Problem list and histories reviewed & adjusted, as indicated.  Additional history: as documented    Labs reviewed in EPIC    Reviewed and updated as needed this visit by clinical staffTobacco  Allergies  Meds  Problems  Med Hx  Surg Hx  Fam Hx  Soc Hx        Reviewed and updated as needed this visit by Provider  Allergies  Meds  Problems         ROS:  Constitutional, HEENT, cardiovascular, pulmonary, gi and gu systems are negative, except as otherwise noted.      OBJECTIVE:                                                    /72 (BP Location: Right arm, Patient Position: Chair, Cuff Size: Adult Large)  Pulse 72  Temp 98.1  F (36.7  C) (Tympanic)  Wt 186 lb 12.8 oz (84.7 kg)  LMP 09/21/2012  BMI 26.05 kg/m2  Body mass index is 26.05 kg/(m^2).  GENERAL APPEARANCE: healthy, alert and no distress  NECK: no adenopathy, no asymmetry, masses, or scars and thyroid normal to palpation  RESP: lungs clear  to auscultation - no rales, rhonchi or wheezes  CV: regular rates and rhythm, normal S1 S2, no S3 or S4 and no murmur, click or rub  PSYCH: mentation appears normal and affect normal/bright         ASSESSMENT/PLAN:                                                    1. Mild major depression (H)  Sl worse   - Lipid panel reflex to direct LDL Fasting  - Hepatic panel  - buPROPion (WELLBUTRIN XL) 150 MG 24 hr tablet; Take 1 tablet (150 mg) by mouth every morning  Dispense: 30 tablet; Refill: 1    2. Psychophysiological insomnia  Stable no change in treatment plan.   - temazepam (RESTORIL) 15 MG capsule; Take 1 capsule (15 mg) by mouth nightly as needed for sleep  Dispense: 60 capsule; Refill: 5    3. Anxiety  Doing ok   - BASIC METABOLIC PANEL  - temazepam (RESTORIL) 15 MG capsule; Take 1 capsule (15 mg) by mouth nightly as needed for sleep  Dispense: 60 capsule; Refill: 5    4. Hypothyroidism, unspecified type  Adjust meds as indicated by above labs.   - TSH WITH FREE T4 REFLEX  - levothyroxine (SYNTHROID/LEVOTHROID) 125 MCG tablet; Take 1 tablet (125 mcg) by mouth daily  Dispense: 90 tablet; Refill: 3    5. Essential hypertension  Stable no change in treatment plan.       Patient Instructions   Change dose of wellbutrin to 150mg     Let me know in 1-2 weeks how you are doing     labs today     Risks, benefits, side effects and rationale for treatment plan fully discussed with the patient and understanding expressed.     Radha Felton MD  [facility]

## 2017-11-28 RX ORDER — LEVOTHYROXINE SODIUM 125 UG/1
125 TABLET ORAL DAILY
Qty: 90 TABLET | Refills: 3 | Status: SHIPPED | OUTPATIENT
Start: 2017-11-28 | End: 2018-11-09

## 2017-11-28 ASSESSMENT — ANXIETY QUESTIONNAIRES: GAD7 TOTAL SCORE: 3

## 2017-12-04 PROBLEM — F41.9 ANXIETY: Status: ACTIVE | Noted: 2017-12-04

## 2018-01-04 ENCOUNTER — TRANSFERRED RECORDS (OUTPATIENT)
Dept: HEALTH INFORMATION MANAGEMENT | Facility: CLINIC | Age: 58
End: 2018-01-04

## 2018-01-16 ENCOUNTER — HOSPITAL ENCOUNTER (OUTPATIENT)
Dept: MRI IMAGING | Facility: CLINIC | Age: 58
Discharge: HOME | End: 2018-01-16
Attending: SPECIALIST | Admitting: SPECIALIST
Payer: COMMERCIAL

## 2018-01-16 DIAGNOSIS — M47.812 OSTEOARTHRITIS OF CERVICAL SPINE, UNSPECIFIED SPINAL OSTEOARTHRITIS COMPLICATION STATUS: ICD-10-CM

## 2018-01-16 PROCEDURE — 72141 MRI NECK SPINE W/O DYE: CPT

## 2018-01-20 DIAGNOSIS — F32.0 MILD MAJOR DEPRESSION (H): ICD-10-CM

## 2018-01-20 NOTE — TELEPHONE ENCOUNTER
"Requested Prescriptions   Pending Prescriptions Disp Refills     buPROPion (WELLBUTRIN XL) 150 MG 24 hr tablet  Last Written Prescription Date:  11/27/17  Last Fill Quantity: 30,  # refills: 1   Last Office Visit with G, P or [facility] prescribing provider:  11/27/17   Future Office Visit:      30 tablet 1     Sig: TAKE ONE TABLET BY MOUTH EVERY MORNING    SSRIs Protocol Failed    1/20/2018  8:07 AM       Failed - PHQ-9 score less than 5 in past 6 months    The PHQ-9 criteria is meant to fail. It requires a PHQ-9 score review  PHQ-9 SCORE 2/29/2016 11/29/2016 11/27/2017   Total Score - - -   Total Score MyChart - - -   Total Score 5 3 7     SHERON-7 SCORE 2/29/2016 11/29/2016 11/27/2017   Total Score - - -   Total Score 0 0 3                Passed - Medication is Bupropion    If the medication is Bupropion (Wellbutrin), and the patient is taking for smoking cessation; OK to refill.         Passed - Patient is age 18 or older       Passed - No active pregnancy on record       Passed - No positive pregnancy test in last 12 months       Passed - Recent (6 mo) or future visit with authorizing provider's specialty    Patient had office visit in the last 6 months or has a visit in the next 30 days with authorizing provider.  See \"Patient Info\" tab in inbasket, or \"Choose Columns\" in Meds & Orders section of the refill encounter.              "

## 2018-01-22 RX ORDER — BUPROPION HYDROCHLORIDE 150 MG/1
TABLET ORAL
Qty: 30 TABLET | Refills: 1 | Status: SHIPPED | OUTPATIENT
Start: 2018-01-22 | End: 2018-03-20

## 2018-03-08 ENCOUNTER — TRANSFERRED RECORDS (OUTPATIENT)
Dept: HEALTH INFORMATION MANAGEMENT | Facility: CLINIC | Age: 58
End: 2018-03-08

## 2018-03-20 DIAGNOSIS — F32.0 MILD MAJOR DEPRESSION (H): ICD-10-CM

## 2018-03-20 DIAGNOSIS — F41.9 ANXIETY: ICD-10-CM

## 2018-03-20 DIAGNOSIS — F51.04 PSYCHOPHYSIOLOGICAL INSOMNIA: ICD-10-CM

## 2018-03-20 RX ORDER — TEMAZEPAM 15 MG/1
15 CAPSULE ORAL
Qty: 60 CAPSULE | Refills: 5 | Status: CANCELLED | OUTPATIENT
Start: 2018-03-20

## 2018-03-20 RX ORDER — TEMAZEPAM 15 MG/1
15 CAPSULE ORAL
Qty: 60 CAPSULE | Refills: 5 | Status: SHIPPED | OUTPATIENT
Start: 2018-03-20 | End: 2018-07-05

## 2018-03-20 RX ORDER — BUPROPION HYDROCHLORIDE 150 MG/1
TABLET ORAL
Qty: 30 TABLET | Refills: 1 | Status: SHIPPED | OUTPATIENT
Start: 2018-03-20 | End: 2018-05-17

## 2018-03-20 NOTE — TELEPHONE ENCOUNTER
temazepam (RESTORIL) 15 MG capsule      Last Written Prescription Date:  11/27/17  Last Fill Quantity: 60,   # refills: 5  Last Office Visit: 11/27/17  Future Office visit:       Routing refill request to provider for review/approval because:  Drug not on the [facility], [facility] or [facility] refill protocol or controlled substance

## 2018-03-20 NOTE — TELEPHONE ENCOUNTER
Chief Complaint   Patient presents with     Refill Request     temazepam (RESTORIL) 15 MG capsule      Refill request received via fax by pharmacy    [facility], [place] - [address] [place] [address] tel:[phone]    Pending Prescriptions:                       Disp   Refills    temazepam (RESTORIL) 15 MG capsule        60 cap*5            Sig: Take 1 capsule (15 mg) by mouth nightly as needed           for sleep         Last Written Prescription Date:  11/27/2017 prescribed by Dr. Felton  Last Fill Quantity: 60 per 60 days,   # refills: 5 (through September 2018)  Last Office Visit with prescribing provider: 12/08/2016  Future Office visit:     due   Yearly physician follow ups are required for refills on all sleep aid, scheduled and federally regulated medications.   Your last appt with Dr. Mcdonald was on 12/08/2016 . Please call central scheduling at [phone] to set up an appt.  We will give you one month worth of medication to enable you to get an appt.  Please feel free to call [phone] if you have any questions.     Called for clarification of dose and to schedule an appointment. Stephanie informed me that she would like to reach out to Dr. Felton's office for future refill requests.

## 2018-03-30 ENCOUNTER — OFFICE VISIT (OUTPATIENT)
Dept: FAMILY MEDICINE | Facility: CLINIC | Age: 58
End: 2018-03-30
Payer: COMMERCIAL

## 2018-03-30 VITALS
HEIGHT: 71 IN | WEIGHT: 196 LBS | HEART RATE: 64 BPM | SYSTOLIC BLOOD PRESSURE: 132 MMHG | DIASTOLIC BLOOD PRESSURE: 80 MMHG | OXYGEN SATURATION: 95 % | BODY MASS INDEX: 27.44 KG/M2 | TEMPERATURE: 98.1 F

## 2018-03-30 DIAGNOSIS — I10 ESSENTIAL HYPERTENSION: ICD-10-CM

## 2018-03-30 DIAGNOSIS — R09.82 POST-NASAL DRAINAGE: Primary | ICD-10-CM

## 2018-03-30 PROCEDURE — 99213 OFFICE O/P EST LOW 20 MIN: CPT | Performed by: NURSE PRACTITIONER

## 2018-03-30 RX ORDER — FLUTICASONE PROPIONATE 50 MCG
1-2 SPRAY, SUSPENSION (ML) NASAL DAILY
Qty: 1 BOTTLE | Refills: 11 | Status: SHIPPED | OUTPATIENT
Start: 2018-03-30 | End: 2018-12-04

## 2018-03-30 NOTE — PROGRESS NOTES
SUBJECTIVE:   Stephanie Trejo is a 57 year old female who presents to clinic today for the following health issues:      ENT Symptoms             Symptoms: cc Present Absent Comment   Fever/Chills   x    Fatigue   x    Muscle Aches   x    Eye Irritation   x    Sneezing   x    Nasal Kevin/Drg  x  In throat    Sinus Pressure/Pain  x  intermittent sinus HA    Loss of smell   x    Dental pain   x    Sore Throat   x    Swollen Glands   x    Ear Pain/Fullness x   L > R   Cough   x    Wheeze   x    Chest Pain   x    Shortness of breath   x    Rash   x    Other   x      Symptom duration:  3 weeks    Symptom severity:  mild    Treatments tried:  sudafed   Contacts:  none      Intermittent sinus symptoms-none currently    Hypertension Follow-up      Outpatient blood pressures are being checked at home/dentist.   Results are 153/93 yesterday.    Low Salt Diet: low salt        Problem list and histories reviewed & adjusted, as indicated.  Additional history: as documented    Patient Active Problem List   Diagnosis     Hypothyroidism     Other and unspecified disc disorder of lumbar region     Essential hypertension     CAMELIA (obstructive sleep apnea)     CARDIOVASCULAR SCREENING; LDL GOAL LESS THAN 100     Mitral valve regurgitation     Anaplasmosis     Atrial fibrillation (H)     Abnormal uterine bleeding     Mild major depression (H)     CHF (congestive heart failure) (H)     Counseling regarding advanced directives     Anxiety     Past Surgical History:   Procedure Laterality Date     BACK SURGERY       COLONOSCOPY       GYN SURGERY       SOFT TISSUE SURGERY       SURGICAL HISTORY OF -            SURGICAL HISTORY OF -       Laparoscopy     SURGICAL HISTORY OF -       Hernia Repair      SURGICAL HISTORY OF -       Back Fusion T3 - L3       Social History   Substance Use Topics     Smoking status: Never Smoker     Smokeless tobacco: Never Used     Alcohol use No     Family History   Problem Relation Age  of Onset     Lipids Mother      Thyroid Disease Mother      Eye Disorder Mother      macular degeneration     Colon Cancer Mother      Anesthesia Reaction Mother      allergic to some     Hypertension Father      Cardiovascular Father      MI     OSTEOPOROSIS Father      Alzheimer Disease Father      Genitourinary Problems Father      incontinence     Breast Cancer Maternal Grandmother      CEREBROVASCULAR DISEASE Maternal Grandfather      DIABETES Paternal Grandmother      GASTROINTESTINAL DISEASE Sister      colitis     Respiratory Daughter      asthma         Current Outpatient Prescriptions   Medication Sig Dispense Refill     fluticasone (FLONASE) 50 MCG/ACT spray Spray 1-2 sprays into both nostrils daily 1 Bottle 11     buPROPion (WELLBUTRIN XL) 150 MG 24 hr tablet TAKE ONE TABLET BY MOUTH EVERY MORNING 30 tablet 1     temazepam (RESTORIL) 15 MG capsule Take 1 capsule (15 mg) by mouth nightly as needed for sleep 60 capsule 5     levothyroxine (SYNTHROID/LEVOTHROID) 125 MCG tablet Take 1 tablet (125 mcg) by mouth daily 90 tablet 3     Omega-3 Fatty Acids (FISH OIL OMEGA-3 PO)        PSYLLIUM HUSK PO        Magnesium Oxide (MAG-200 PO)        Probiotic Product (PROBIOTIC DAILY PO)        Misc Natural Products (LUTEIN 20 PO)        aspirin 81 MG chewable tablet Take 1 tablet by mouth daily. 90 tablet      MULTIVITAMIN/MINERALS PO one tablet daily       Allergies   Allergen Reactions     Amlodipine Besylate Swelling     Swelling in ankles, Racing pulse also     Ammonia Hives and Swelling     Penicillins Rash     Labs reviewed in EPIC    Reviewed and updated as needed this visit by clinical staff  Tobacco  Allergies  Meds  Med Hx  Surg Hx  Fam Hx  Soc Hx      Reviewed and updated as needed this visit by Provider         ROS:  Constitutional, HEENT, cardiovascular, pulmonary, gi and gu systems are negative, except as otherwise noted.    OBJECTIVE:     /80  Pulse 64  Temp 98.1  F (36.7  C) (Tympanic)   "Ht 5' 11\" (1.803 m)  Wt 196 lb (88.9 kg)  LMP 09/21/2012  SpO2 95%  BMI 27.34 kg/m2  Body mass index is 27.34 kg/(m^2).  GENERAL: healthy, alert and no distress  HENT: normal cephalic/atraumatic, both ears: clear effusion, nose and mouth without ulcers or lesions, nasal mucosa edematous , oropharynx clear, oral mucous membranes moist and sinuses: not tender  NECK: no adenopathy  RESP: lungs clear to auscultation - no rales, rhonchi or wheezes  CV: regular rate and rhythm, normal S1 S2, no S3 or S4, no murmur  ABDOMEN: soft, nontender, and bowel sounds normal  PSYCH: mentation appears normal, affect normal/bright    Diagnostic Test Results:  none     ASSESSMENT/PLAN:     1. Post-nasal drainage  Will try Flonase for symptoms.  Stop the Sudafed due to history of hypertension and recently elevated blood pressure-suspect that the Sudafed is contributing.  Can try antibiotics if symptoms not improving with Flonase.  Symptomatic care and follow up discussed.  - fluticasone (FLONASE) 50 MCG/ACT spray; Spray 1-2 sprays into both nostrils daily  Dispense: 1 Bottle; Refill: 11    2. Essential hypertension  Stable-per above.    Home care instructions were reviewed with the patient. The risks, benefits and treatment options of prescribed medications or other treatments have been discussed with the patient. The patient verbalized their understanding and should call or follow up if no improvement or if they develop further problems.    Patient Instructions   Flonase sent to the pharmacy for symptoms    Follow up if symptoms do not improve or worsen.        KAY Leon [facility]  "

## 2018-03-30 NOTE — MR AVS SNAPSHOT
"              After Visit Summary   3/30/2018    Stephanie Trejo    MRN: [id number]           Patient Information     Date Of Birth          1960        Visit Information        Provider Department      3/30/2018 9:40 AM Mckenzie Fowler APRN CNP [facility]        Today's Diagnoses     Post-nasal drainage    -  1      Care Instructions    Flonase sent to the pharmacy for symptoms    Follow up if symptoms do not improve or worsen.            Follow-ups after your visit        Who to contact     If you have questions or need follow up information about today's clinic visit or your schedule please contact [facility] directly at [phone].  Normal or non-critical lab and imaging results will be communicated to you by MyChart, letter or phone within 4 business days after the clinic has received the results. If you do not hear from us within 7 days, please contact the clinic through [employer] or phone. If you have a critical or abnormal lab result, we will notify you by phone as soon as possible.  Submit refill requests through [employer] or call your pharmacy and they will forward the refill request to us. Please allow 3 business days for your refill to be completed.          Additional Information About Your Visit        MyChart Information     [employer] gives you secure access to your electronic health record. If you see a primary care provider, you can also send messages to your care team and make appointments. If you have questions, please call your primary care clinic.  If you do not have a primary care provider, please call [phone] and they will assist you.        Care EveryWhere ID     This is your Care EveryWhere ID. This could be used by other organizations to access your [place] medical records  [id number]        Your Vitals Were     Pulse Temperature Height Last Period Pulse Oximetry BMI (Body Mass Index)    64 98.1  F (36.7  C) (Tympanic) 5' 11\" (1.803 m) " 09/21/2012 95% 27.34 kg/m2       Blood Pressure from Last 3 Encounters:   03/30/18 132/80   11/27/17 118/72   11/20/17 138/86    Weight from Last 3 Encounters:   03/30/18 196 lb (88.9 kg)   11/27/17 186 lb 12.8 oz (84.7 kg)   11/20/17 188 lb (85.3 kg)              Today, you had the following     No orders found for display         Today's Medication Changes          These changes are accurate as of 3/30/18 10:08 AM.  If you have any questions, ask your nurse or doctor.               Start taking these medicines.        Dose/Directions    fluticasone 50 MCG/ACT spray   Commonly known as:  FLONASE   Used for:  Post-nasal drainage   Started by:  Mckenzie Fowler APRN CNP        Dose:  1-2 spray   Spray 1-2 sprays into both nostrils daily   Quantity:  1 Bottle   Refills:  11            Where to get your medicines      These medications were sent to [facility] [place] [id number]     Phone:  [phone]     fluticasone 50 MCG/ACT spray                Primary Care Provider Office Phone # Fax #    Radha Felton [phone]       [address] [place] [id number]        Equal Access to Services     CRUZ OLMEDO AH: Hadii giovanna nielsen hadasho Soivetteali, waaxda luqadaha, qaybta kaalmada adeegyada, virgen avelar. So [facility] [phone].    ATENCIÓN: Si habla español, tiene a ross disposición servicios gratuitos de asistencia lingüística. Llame al [phone].    We comply with applicable federal civil rights laws and [place] laws. We do not discriminate on the basis of race, color, national origin, age, disability, sex, sexual orientation, or gender identity.            Thank you!     Thank you for choosing [facility]  for your care. Our goal is always to provide you with excellent care. Hearing back from our patients is one way we can continue to improve our services. Please  take a few minutes to complete the written survey that you may receive in the mail after your visit with us. Thank you!             Your Updated Medication List - Protect others around you: Learn how to safely use, store and throw away your medicines at www.disposemymeds.org.          This list is accurate as of 3/30/18 10:08 AM.  Always use your most recent med list.                   Brand Name Dispense Instructions for use Diagnosis    aspirin 81 MG chewable tablet     90 tablet    Take 1 tablet by mouth daily.    Mild major depression (H)       buPROPion 150 MG 24 hr tablet    WELLBUTRIN XL    30 tablet    TAKE ONE TABLET BY MOUTH EVERY MORNING    Mild major depression (H)       FISH OIL OMEGA-3 PO           fluticasone 50 MCG/ACT spray    FLONASE    1 Bottle    Spray 1-2 sprays into both nostrils daily    Post-nasal drainage       levothyroxine 125 MCG tablet    SYNTHROID/LEVOTHROID    90 tablet    Take 1 tablet (125 mcg) by mouth daily    Hypothyroidism, unspecified type       LUTEIN 20 PO           MAG-200 PO           MULTIVITAMIN/MINERALS PO      one tablet daily        PROBIOTIC DAILY PO           PSYLLIUM HUSK PO           temazepam 15 MG capsule    RESTORIL    60 capsule    Take 1 capsule (15 mg) by mouth nightly as needed for sleep    Psychophysiological insomnia, Anxiety

## 2018-05-17 DIAGNOSIS — F32.0 MILD MAJOR DEPRESSION (H): ICD-10-CM

## 2018-05-17 RX ORDER — BUPROPION HYDROCHLORIDE 150 MG/1
TABLET ORAL
Qty: 30 TABLET | Refills: 1 | Status: SHIPPED | OUTPATIENT
Start: 2018-05-17 | End: 2018-12-04

## 2018-07-05 ENCOUNTER — TELEPHONE (OUTPATIENT)
Dept: FAMILY MEDICINE | Facility: CLINIC | Age: 58
End: 2018-07-05

## 2018-07-05 DIAGNOSIS — F51.04 PSYCHOPHYSIOLOGICAL INSOMNIA: ICD-10-CM

## 2018-07-05 DIAGNOSIS — F41.9 ANXIETY: ICD-10-CM

## 2018-07-05 RX ORDER — TEMAZEPAM 15 MG/1
15-30 CAPSULE ORAL
Qty: 60 CAPSULE | Refills: 0 | Status: SHIPPED | OUTPATIENT
Start: 2018-07-05 | End: 2018-09-18

## 2018-07-05 NOTE — TELEPHONE ENCOUNTER
temazepam (RESTORIL) 15 MG capsule (Discontinued) 60 capsule 2 8/19/2013 10/31/2013 --   Sig: Take 1-2 tablets by mouth 30 minutes before bed.  Start with 1 tablet and safe to increase to 2 tablets after 3-4 days if residual insomnia.   Class: Normal   Reason for Discontinue: Reorder   Order: [id number]

## 2018-07-05 NOTE — TELEPHONE ENCOUNTER
Reason for Call:  Medication or medication refill:    Do you use a [facility]?  Name of the pharmacy and phone number for the current request:  [facility] [place]    Name of the medication requested: Temazepam  Directions should read 1 tab per night can take 2 after 3 days as needed. This was originally given by Sleep Provider Dr. Mcdonald. Dr. Felton said she would fill for pt. This last script was given by MARK Fowler at last OV and is for one at night. Pt has run out of her medication and unable to fill due to how it is written.     Other request: Please Advise and send new Rx to Pharmacy.    Can we leave a detailed message on this number? YES    Phone number patient can be reached at: Home number on file [phone] (home)    Best Time: Any Time      Call taken on 7/5/2018 at 8:33 AM by Sheila Childress

## 2018-08-28 ENCOUNTER — OFFICE VISIT (OUTPATIENT)
Dept: DERMATOLOGY | Facility: CLINIC | Age: 58
End: 2018-08-28
Payer: COMMERCIAL

## 2018-08-28 VITALS — DIASTOLIC BLOOD PRESSURE: 74 MMHG | SYSTOLIC BLOOD PRESSURE: 156 MMHG | HEIGHT: 71 IN | HEART RATE: 69 BPM

## 2018-08-28 DIAGNOSIS — D18.00 ANGIOMA: ICD-10-CM

## 2018-08-28 DIAGNOSIS — L81.4 LENTIGO: ICD-10-CM

## 2018-08-28 DIAGNOSIS — D22.9 NEVUS: ICD-10-CM

## 2018-08-28 DIAGNOSIS — L82.1 SK (SEBORRHEIC KERATOSIS): Primary | ICD-10-CM

## 2018-08-28 PROCEDURE — 99203 OFFICE O/P NEW LOW 30 MIN: CPT | Performed by: DERMATOLOGY

## 2018-08-28 NOTE — MR AVS SNAPSHOT
"              After Visit Summary   8/28/2018    Stephanie Trejo    MRN: [id number]           Patient Information     Date Of Birth          1960        Visit Information        Provider Department      8/28/2018 1:45 PM Oscar Echols MD [facility]        Today's Diagnoses     SK (seborrheic keratosis)    -  1    Lentigo        Angioma        Nevus           Follow-ups after your visit        Who to contact     If you have questions or need follow up information about today's clinic visit or your schedule please contact [facility] directly at [phone].  Normal or non-critical lab and imaging results will be communicated to you by MyChart, letter or phone within 4 business days after the clinic has received the results. If you do not hear from us within 7 days, please contact the clinic through [employer] or phone. If you have a critical or abnormal lab result, we will notify you by phone as soon as possible.  Submit refill requests through [employer] or call your pharmacy and they will forward the refill request to us. Please allow 3 business days for your refill to be completed.          Additional Information About Your Visit        MyChart Information     [employer] gives you secure access to your electronic health record. If you see a primary care provider, you can also send messages to your care team and make appointments. If you have questions, please call your primary care clinic.  If you do not have a primary care provider, please call [phone] and they will assist you.        Care EveryWhere ID     This is your Care EveryWhere ID. This could be used by other organizations to access your [place] medical records  [id number]        Your Vitals Were     Pulse Height Last Period             69 1.803 m (5' 11\") 09/21/2012          Blood Pressure from Last 3 Encounters:   08/28/18 156/74   03/30/18 132/80   11/27/17 118/72    Weight from Last 3 Encounters:   03/30/18 " 88.9 kg (196 lb)   11/27/17 84.7 kg (186 lb 12.8 oz)   11/20/17 85.3 kg (188 lb)              Today, you had the following     No orders found for display       Primary Care Provider Office Phone # Fax #    Radha Felton [phone] [address] [address]        Equal Access to Services     CRUZ OLMEDO : Hadii aad ku hadasho Soomaali, waaxda luqadaha, qaybta kaalmada adeegyada, waxay idiin hayaan adeeg khtamekash la'aan ah. So [facility] [phone].    ATENCIÓN: Si garett hall, tiene a ross disposición servicios gratuitos de asistencia lingüística. Llame al [phone].    We comply with applicable federal civil rights laws and [place] laws. We do not discriminate on the basis of race, color, national origin, age, disability, sex, sexual orientation, or gender identity.            Thank you!     Thank you for choosing [facility]  for your care. Our goal is always to provide you with excellent care. Hearing back from our patients is one way we can continue to improve our services. Please take a few minutes to complete the written survey that you may receive in the mail after your visit with us. Thank you!             Your Updated Medication List - Protect others around you: Learn how to safely use, store and throw away your medicines at www.disposemymeds.org.          This list is accurate as of 8/28/18  1:51 PM.  Always use your most recent med list.                   Brand Name Dispense Instructions for use Diagnosis    aspirin 81 MG chewable tablet     90 tablet    Take 1 tablet by mouth daily.    Mild major depression (H)       buPROPion 150 MG 24 hr tablet    WELLBUTRIN XL    30 tablet    TAKE ONE TABLET BY MOUTH EVERY MORNING    Mild major depression (H)       FISH OIL OMEGA-3 PO           fluticasone 50 MCG/ACT spray    FLONASE    1 Bottle    Spray 1-2 sprays into both nostrils daily    Post-nasal drainage       levothyroxine 125 MCG tablet     SYNTHROID/LEVOTHROID    90 tablet    Take 1 tablet (125 mcg) by mouth daily    Hypothyroidism, unspecified type       LUTEIN 20 PO           MAG-200 PO           MULTIVITAMIN/MINERALS PO      one tablet daily        PROBIOTIC DAILY PO           PSYLLIUM HUSK PO           temazepam 15 MG capsule    RESTORIL    60 capsule    Take 1-2 capsules (15-30 mg) by mouth nightly as needed for sleep    Psychophysiological insomnia, Anxiety

## 2018-08-28 NOTE — PROGRESS NOTES
Stephanie Trejo is a 57 year old year old female patient here today for brown spots on skin.   .  Patient states this has been present for a while.  Patient reports the following symptoms:  none .  Patient reports the following previous treatments none.  Patient reports the following modifying factors none.  Associated symptoms: none.  Patient has no other skin complaints today.  Remainder of the HPI, Meds, PMH, Allergies, FH, and SH was reviewed in chart.      Past Medical History:   Diagnosis Date     CHF (congestive heart failure) (H) 2013     Depressive disorder      Hypertension      Mitral valve insufficiency, acquired      Other and unspecified disc disorder of lumbar region      Scoliosis (and kyphoscoliosis), idiopathic      Undiagnosed cardiac murmurs      Unspecified hypothyroidism        Past Surgical History:   Procedure Laterality Date     BACK SURGERY       COLONOSCOPY       GYN SURGERY       SOFT TISSUE SURGERY       SURGICAL HISTORY OF -            SURGICAL HISTORY OF -       Laparoscopy     SURGICAL HISTORY OF -       Hernia Repair      SURGICAL HISTORY OF -       Back Fusion T3 - L3        Family History   Problem Relation Age of Onset     Lipids Mother      Thyroid Disease Mother      Eye Disorder Mother      macular degeneration     Colon Cancer Mother      Anesthesia Reaction Mother      allergic to some     Hypertension Father      Cardiovascular Father      MI     Osteoperosis Father      Alzheimer Disease Father      Genitourinary Problems Father      incontinence     Breast Cancer Maternal Grandmother      Cerebrovascular Disease Maternal Grandfather      Diabetes Paternal Grandmother      GASTROINTESTINAL DISEASE Sister      colitis     Respiratory Daughter      asthma       Social History     Social History     Marital status:      Spouse name: N/A     Number of children: N/A     Years of education: N/A     Occupational History     Not on file.     Social  "History Main Topics     Smoking status: Never Smoker     Smokeless tobacco: Never Used     Alcohol use No     Drug use: No     Sexual activity: Yes     Partners: Male     Birth control/ protection: Post-menopausal     Other Topics Concern     Parent/Sibling W/ Cabg, Mi Or Angioplasty Before 65f 55m? No     Social History Narrative       Outpatient Encounter Prescriptions as of 8/28/2018   Medication Sig Dispense Refill     aspirin 81 MG chewable tablet Take 1 tablet by mouth daily. 90 tablet      levothyroxine (SYNTHROID/LEVOTHROID) 125 MCG tablet Take 1 tablet (125 mcg) by mouth daily 90 tablet 3     Magnesium Oxide (MAG-200 PO)        Misc Natural Products (LUTEIN 20 PO)        MULTIVITAMIN/MINERALS PO one tablet daily       Omega-3 Fatty Acids (FISH OIL OMEGA-3 PO)        Probiotic Product (PROBIOTIC DAILY PO)        PSYLLIUM HUSK PO        temazepam (RESTORIL) 15 MG capsule Take 1-2 capsules (15-30 mg) by mouth nightly as needed for sleep 60 capsule 0     buPROPion (WELLBUTRIN XL) 150 MG 24 hr tablet TAKE ONE TABLET BY MOUTH EVERY MORNING (Patient not taking: Reported on 8/28/2018) 30 tablet 1     fluticasone (FLONASE) 50 MCG/ACT spray Spray 1-2 sprays into both nostrils daily (Patient not taking: Reported on 8/28/2018) 1 Bottle 11     No facility-administered encounter medications on file as of 8/28/2018.              Review Of Systems  Skin: As above  Eyes: negative  Ears/Nose/Throat: negative  Respiratory: No shortness of breath, dyspnea on exertion, cough, or hemoptysis  Cardiovascular: negative  Gastrointestinal: negative  Genitourinary: negative  Musculoskeletal: negative  Neurologic: negative  Psychiatric: negative  Hematologic/Lymphatic/Immunologic: negative  Endocrine: negative      O:   NAD, WDWN, Alert & Oriented, Mood & Affect wnl, Vitals stable   Here today alone   BP (!) 172/91  Pulse 69  Ht 1.803 m (5' 11\")  LMP 09/21/2012   General appearance normal   Vitals stable   Alert, oriented and in no " acute distress      Following lymph nodes palpated: Occipital, Cervical, Supraclavicular no lad   Stuck on papules and brown macules on trunk and ext    Red papule son trunk   1-3mm pigmented macules with regular borders and pigment netwokrs on trunk and ext         The remainder of the full exam was unremarkable; the following areas were examined:  conjunctiva/lids, oral mucosa, neck, peripheral vascular system, abdomen, lymph nodes, digits/nails, eccrine and apocrine glands, scalp/hair, face, neck, chest, abdomen, buttocks, back, RUE, LUE, RLE, LLE       Eyes: Conjunctivae/lids:Normal     ENT: Lips, buccal mucosa, tongue: normal    MSK:Normal    Cardiovascular: peripheral edema none    Pulm: Breathing Normal    Lymph Nodes: No Head and Neck Lymphadenopathy     Neuro/Psych: Orientation:Normal; Mood/Affect:Normal      A/P:  1. Seborrheic keratosis, letnigo, angioma, nevi   BENIGN LESIONS DISCUSSED WITH PATIENT:  I discussed the specifics of tumor, prognosis, and genetics of benign lesions.  I explained that treatment of these lesions would be purely cosmetic and not medically neccessary.  I discussed with patient different removal options including excision, cautery and /or laser.      Nature and genetics of benign skin lesions dicussed with patient.  Signs and Symptoms of skin cancer discussed with patient.  ABCDEs of melanoma reviewed with patient.  Patient encouraged to perform monthly skin exams.  UV precautions reviewed with patient.  Patient to follow up with Primary Care provider regarding elevated blood pressure.  Skin care regimen reviewed with patient: Eliminate harsh soaps, i.e. Dial, zest, irsih spring; Mild soaps such as Cetaphil or Dove sensitive skin, avoid hot or cold showers, aggressive use of emollients including vanicream, cetaphil or cerave discussed with patient.    Risks of non-melanoma skin cancer discussed with patient   Return to clinic 12 months  Patient to follow up with Primary Care  provider regarding elevated blood pressure.

## 2018-08-28 NOTE — LETTER
2018         RE: Stephanie Trejo   [address]  [place] [address]        Dear Colleague,    Thank you for referring your patient, Stephanie Trejo, to the [facility]. Please see a copy of my visit note below.    Stephanie Trejo is a 57 year old year old female patient here today for brown spots on skin.   .  Patient states this has been present for a while.  Patient reports the following symptoms:  none .  Patient reports the following previous treatments none.  Patient reports the following modifying factors none.  Associated symptoms: none.  Patient has no other skin complaints today.  Remainder of the HPI, Meds, PMH, Allergies, FH, and SH was reviewed in chart.      Past Medical History:   Diagnosis Date     CHF (congestive heart failure) (H) 2013     Depressive disorder      Hypertension      Mitral valve insufficiency, acquired      Other and unspecified disc disorder of lumbar region      Scoliosis (and kyphoscoliosis), idiopathic      Undiagnosed cardiac murmurs      Unspecified hypothyroidism        Past Surgical History:   Procedure Laterality Date     BACK SURGERY       COLONOSCOPY       GYN SURGERY       SOFT TISSUE SURGERY       SURGICAL HISTORY OF -            SURGICAL HISTORY OF -       Laparoscopy     SURGICAL HISTORY OF -       Hernia Repair      SURGICAL HISTORY OF -       Back Fusion T3 - L3        Family History   Problem Relation Age of Onset     Lipids Mother      Thyroid Disease Mother      Eye Disorder Mother      macular degeneration     Colon Cancer Mother      Anesthesia Reaction Mother      allergic to some     Hypertension Father      Cardiovascular Father      MI     Osteoperosis Father      Alzheimer Disease Father      Genitourinary Problems Father      incontinence     Breast Cancer Maternal Grandmother      Cerebrovascular Disease Maternal Grandfather      Diabetes Paternal Grandmother      GASTROINTESTINAL DISEASE Sister       colitis     Respiratory Daughter      asthma       Social History     Social History     Marital status:      Spouse name: N/A     Number of children: N/A     Years of education: N/A     Occupational History     Not on file.     Social History Main Topics     Smoking status: Never Smoker     Smokeless tobacco: Never Used     Alcohol use No     Drug use: No     Sexual activity: Yes     Partners: Male     Birth control/ protection: Post-menopausal     Other Topics Concern     Parent/Sibling W/ Cabg, Mi Or Angioplasty Before 65f 55m? No     Social History Narrative       Outpatient Encounter Prescriptions as of 8/28/2018   Medication Sig Dispense Refill     aspirin 81 MG chewable tablet Take 1 tablet by mouth daily. 90 tablet      levothyroxine (SYNTHROID/LEVOTHROID) 125 MCG tablet Take 1 tablet (125 mcg) by mouth daily 90 tablet 3     Magnesium Oxide (MAG-200 PO)        Misc Natural Products (LUTEIN 20 PO)        MULTIVITAMIN/MINERALS PO one tablet daily       Omega-3 Fatty Acids (FISH OIL OMEGA-3 PO)        Probiotic Product (PROBIOTIC DAILY PO)        PSYLLIUM HUSK PO        temazepam (RESTORIL) 15 MG capsule Take 1-2 capsules (15-30 mg) by mouth nightly as needed for sleep 60 capsule 0     buPROPion (WELLBUTRIN XL) 150 MG 24 hr tablet TAKE ONE TABLET BY MOUTH EVERY MORNING (Patient not taking: Reported on 8/28/2018) 30 tablet 1     fluticasone (FLONASE) 50 MCG/ACT spray Spray 1-2 sprays into both nostrils daily (Patient not taking: Reported on 8/28/2018) 1 Bottle 11     No facility-administered encounter medications on file as of 8/28/2018.              Review Of Systems  Skin: As above  Eyes: negative  Ears/Nose/Throat: negative  Respiratory: No shortness of breath, dyspnea on exertion, cough, or hemoptysis  Cardiovascular: negative  Gastrointestinal: negative  Genitourinary: negative  Musculoskeletal: negative  Neurologic: negative  Psychiatric: negative  Hematologic/Lymphatic/Immunologic:  "negative  Endocrine: negative      O:   NAD, WDWN, Alert & Oriented, Mood & Affect wnl, Vitals stable   Here today alone   BP (!) 172/91  Pulse 69  Ht 1.803 m (5' 11\")  LMP 09/21/2012   General appearance normal   Vitals stable   Alert, oriented and in no acute distress      Following lymph nodes palpated: Occipital, Cervical, Supraclavicular no lad   Stuck on papules and brown macules on trunk and ext    Red papule son trunk   1-3mm pigmented macules with regular borders and pigment netwokrs on trunk and ext         The remainder of the full exam was unremarkable; the following areas were examined:  conjunctiva/lids, oral mucosa, neck, peripheral vascular system, abdomen, lymph nodes, digits/nails, eccrine and apocrine glands, scalp/hair, face, neck, chest, abdomen, buttocks, back, RUE, LUE, RLE, LLE       Eyes: Conjunctivae/lids:Normal     ENT: Lips, buccal mucosa, tongue: normal    MSK:Normal    Cardiovascular: peripheral edema none    Pulm: Breathing Normal    Lymph Nodes: No Head and Neck Lymphadenopathy     Neuro/Psych: Orientation:Normal; Mood/Affect:Normal      A/P:  1. Seborrheic keratosis, letnigo, angioma, nevi   BENIGN LESIONS DISCUSSED WITH PATIENT:  I discussed the specifics of tumor, prognosis, and genetics of benign lesions.  I explained that treatment of these lesions would be purely cosmetic and not medically neccessary.  I discussed with patient different removal options including excision, cautery and /or laser.      Nature and genetics of benign skin lesions dicussed with patient.  Signs and Symptoms of skin cancer discussed with patient.  ABCDEs of melanoma reviewed with patient.  Patient encouraged to perform monthly skin exams.  UV precautions reviewed with patient.  Patient to follow up with Primary Care provider regarding elevated blood pressure.  Skin care regimen reviewed with patient: Eliminate harsh soaps, i.e. Dial, zest, irsih spring; Mild soaps such as Cetaphil or Dove sensitive " skin, avoid hot or cold showers, aggressive use of emollients including vanicream, cetaphil or cerave discussed with patient.    Risks of non-melanoma skin cancer discussed with patient   Return to clinic 12 months  Patient to follow up with Primary Care provider regarding elevated blood pressure.        Again, thank you for allowing me to participate in the care of your patient.        Sincerely,        Oscar Echols MD

## 2018-09-18 DIAGNOSIS — F51.04 PSYCHOPHYSIOLOGICAL INSOMNIA: ICD-10-CM

## 2018-09-18 DIAGNOSIS — F41.9 ANXIETY: ICD-10-CM

## 2018-09-18 RX ORDER — TEMAZEPAM 15 MG/1
15-30 CAPSULE ORAL
Qty: 60 CAPSULE | Refills: 0 | Status: SHIPPED | OUTPATIENT
Start: 2018-09-18 | End: 2018-10-23

## 2018-09-18 NOTE — TELEPHONE ENCOUNTER
----- Message from Anil Llamas MD sent at 1/13/2017  8:15 AM CST -----  Please notify patient    Hemogram: normal  CMP: satisfactory  FLP: satisfactory  PSA: normal  TSH: improved  MAR: mildly elevated  A1C: up slightly but satisfactory   Temazepam 15 MG      Last Written Prescription Date:  7/5/18  Last Fill Quantity: 60,   # refills: 0  Last Office Visit: 3/3/18 CECELIA  Future Office visit:       Routing refill request to provider for review/approval because:  Drug not on the [facility], P or [facility] refill protocol or controlled substance

## 2018-10-23 DIAGNOSIS — F51.04 PSYCHOPHYSIOLOGICAL INSOMNIA: ICD-10-CM

## 2018-10-23 DIAGNOSIS — F41.9 ANXIETY: ICD-10-CM

## 2018-10-23 RX ORDER — TEMAZEPAM 15 MG/1
15-30 CAPSULE ORAL
Qty: 60 CAPSULE | Refills: 0 | Status: SHIPPED | OUTPATIENT
Start: 2018-10-23 | End: 2018-12-04

## 2018-10-23 NOTE — TELEPHONE ENCOUNTER
Temazepam 15 Cap      Last Written Prescription Date:  9/18/18  Last Fill Quantity: 60,   # refills: 0  Last Office Visit: 3/30/18  Future Office visit:       Routing refill request to provider for review/approval because:  Drug not on the [facility], P or [facility] refill protocol or controlled substance

## 2018-11-09 DIAGNOSIS — E03.9 HYPOTHYROIDISM, UNSPECIFIED TYPE: ICD-10-CM

## 2018-11-09 DIAGNOSIS — Z13.6 CARDIOVASCULAR SCREENING; LDL GOAL LESS THAN 100: ICD-10-CM

## 2018-11-09 DIAGNOSIS — I10 ESSENTIAL HYPERTENSION: Primary | ICD-10-CM

## 2018-11-09 RX ORDER — LEVOTHYROXINE SODIUM 125 UG/1
125 TABLET ORAL DAILY
Qty: 30 TABLET | Refills: 0 | Status: SHIPPED | OUTPATIENT
Start: 2018-11-09 | End: 2018-12-04

## 2018-11-09 NOTE — TELEPHONE ENCOUNTER
"Requested Prescriptions   Pending Prescriptions Disp Refills     levothyroxine (SYNTHROID/LEVOTHROID) 125 MCG tablet 90 tablet 3     Sig: Take 1 tablet (125 mcg) by mouth daily    Thyroid Protocol Passed    11/9/2018  1:23 PM       Passed - Patient is 12 years or older       Passed - Recent (12 mo) or future (30 days) visit within the authorizing provider's specialty    Patient had office visit in the last 12 months or has a visit in the next 30 days with authorizing provider or within the authorizing provider's specialty.  See \"Patient Info\" tab in inbasket, or \"Choose Columns\" in Meds & Orders section of the refill encounter.             Passed - Normal TSH on file in past 12 months    Recent Labs   Lab Test  11/27/17   1029   TSH  1.65             Passed - No active pregnancy on record    If patient is pregnant or has had a positive pregnancy test, please check TSH.         Passed - No positive pregnancy test in past 12 months    If patient is pregnant or has had a positive pregnancy test, please check TSH.          Last Written Prescription Date:  11/28/17  Last Fill Quantity: 90,  # refills: 3   Last office visit: 3/30/2018 with prescribing provider:  Brit Fowler   Future Office Visit:      "

## 2018-11-09 NOTE — TELEPHONE ENCOUNTER
Medication is being filled for 1 time refill only due to:  Patient needs labs .. Patient needs to be seen because last OV 11/27/17.  Called pt, appt scheduled with Dr. Felton for 12/4/18, at 2:40pm.  Since this is a late day appt and pt due for fasting lipids, can future orders be placed so she can have done one morning prior to this appt?    Future lab orders pended for your consideration.     Isabell PETERSON RN

## 2018-11-28 DIAGNOSIS — E03.9 HYPOTHYROIDISM, UNSPECIFIED TYPE: ICD-10-CM

## 2018-11-28 DIAGNOSIS — I10 ESSENTIAL HYPERTENSION: ICD-10-CM

## 2018-11-28 DIAGNOSIS — Z13.6 CARDIOVASCULAR SCREENING; LDL GOAL LESS THAN 100: ICD-10-CM

## 2018-11-28 LAB
ALBUMIN SERPL-MCNC: 3.7 G/DL (ref 3.4–5)
ALP SERPL-CCNC: 129 U/L (ref 40–150)
ALT SERPL W P-5'-P-CCNC: 32 U/L (ref 0–50)
ANION GAP SERPL CALCULATED.3IONS-SCNC: 6 MMOL/L (ref 3–14)
AST SERPL W P-5'-P-CCNC: 28 U/L (ref 0–45)
BILIRUB DIRECT SERPL-MCNC: 0.2 MG/DL (ref 0–0.2)
BILIRUB SERPL-MCNC: 0.6 MG/DL (ref 0.2–1.3)
BUN SERPL-MCNC: 17 MG/DL (ref 7–30)
CALCIUM SERPL-MCNC: 8.7 MG/DL (ref 8.5–10.1)
CHLORIDE SERPL-SCNC: 108 MMOL/L (ref 94–109)
CHOLEST SERPL-MCNC: 213 MG/DL
CO2 SERPL-SCNC: 30 MMOL/L (ref 20–32)
CREAT SERPL-MCNC: 0.86 MG/DL (ref 0.52–1.04)
ERYTHROCYTE [DISTWIDTH] IN BLOOD BY AUTOMATED COUNT: 13.8 % (ref 10–15)
GFR SERPL CREATININE-BSD FRML MDRD: 68 ML/MIN/1.7M2
GLUCOSE SERPL-MCNC: 86 MG/DL (ref 70–99)
HCT VFR BLD AUTO: 45.6 % (ref 35–47)
HDLC SERPL-MCNC: 69 MG/DL
HGB BLD-MCNC: 14.4 G/DL (ref 11.7–15.7)
LDLC SERPL CALC-MCNC: 128 MG/DL
MCH RBC QN AUTO: 29.7 PG (ref 26.5–33)
MCHC RBC AUTO-ENTMCNC: 31.6 G/DL (ref 31.5–36.5)
MCV RBC AUTO: 94 FL (ref 78–100)
NONHDLC SERPL-MCNC: 144 MG/DL
PLATELET # BLD AUTO: 140 10E9/L (ref 150–450)
POTASSIUM SERPL-SCNC: 4 MMOL/L (ref 3.4–5.3)
PROT SERPL-MCNC: 7.2 G/DL (ref 6.8–8.8)
RBC # BLD AUTO: 4.85 10E12/L (ref 3.8–5.2)
SODIUM SERPL-SCNC: 144 MMOL/L (ref 133–144)
TRIGL SERPL-MCNC: 78 MG/DL
TSH SERPL DL<=0.005 MIU/L-ACNC: 0.5 MU/L (ref 0.4–4)
WBC # BLD AUTO: 4.7 10E9/L (ref 4–11)

## 2018-11-28 PROCEDURE — 84443 ASSAY THYROID STIM HORMONE: CPT | Performed by: FAMILY MEDICINE

## 2018-11-28 PROCEDURE — 85027 COMPLETE CBC AUTOMATED: CPT | Performed by: FAMILY MEDICINE

## 2018-11-28 PROCEDURE — 80061 LIPID PANEL: CPT | Performed by: FAMILY MEDICINE

## 2018-11-28 PROCEDURE — 80076 HEPATIC FUNCTION PANEL: CPT | Performed by: FAMILY MEDICINE

## 2018-11-28 PROCEDURE — 80048 BASIC METABOLIC PNL TOTAL CA: CPT | Performed by: FAMILY MEDICINE

## 2018-11-28 PROCEDURE — 36415 COLL VENOUS BLD VENIPUNCTURE: CPT | Performed by: FAMILY MEDICINE

## 2018-12-04 ENCOUNTER — OFFICE VISIT (OUTPATIENT)
Dept: FAMILY MEDICINE | Facility: CLINIC | Age: 58
End: 2018-12-04
Payer: COMMERCIAL

## 2018-12-04 VITALS
TEMPERATURE: 98 F | DIASTOLIC BLOOD PRESSURE: 80 MMHG | SYSTOLIC BLOOD PRESSURE: 172 MMHG | HEART RATE: 63 BPM | WEIGHT: 197.4 LBS | OXYGEN SATURATION: 98 % | BODY MASS INDEX: 27.64 KG/M2 | HEIGHT: 71 IN

## 2018-12-04 DIAGNOSIS — G47.33 OSA (OBSTRUCTIVE SLEEP APNEA): ICD-10-CM

## 2018-12-04 DIAGNOSIS — E03.9 HYPOTHYROIDISM, UNSPECIFIED TYPE: Primary | ICD-10-CM

## 2018-12-04 DIAGNOSIS — I10 ESSENTIAL HYPERTENSION: ICD-10-CM

## 2018-12-04 DIAGNOSIS — F32.0 MILD MAJOR DEPRESSION (H): ICD-10-CM

## 2018-12-04 DIAGNOSIS — F41.9 ANXIETY: ICD-10-CM

## 2018-12-04 DIAGNOSIS — F51.04 PSYCHOPHYSIOLOGICAL INSOMNIA: ICD-10-CM

## 2018-12-04 PROCEDURE — 99214 OFFICE O/P EST MOD 30 MIN: CPT | Performed by: FAMILY MEDICINE

## 2018-12-04 RX ORDER — LEVOTHYROXINE SODIUM 125 UG/1
125 TABLET ORAL DAILY
Qty: 90 TABLET | Refills: 3 | Status: SHIPPED | OUTPATIENT
Start: 2018-12-04 | End: 2019-12-02

## 2018-12-04 RX ORDER — TEMAZEPAM 15 MG/1
15-30 CAPSULE ORAL
Qty: 60 CAPSULE | Refills: 5 | Status: SHIPPED | OUTPATIENT
Start: 2018-12-04 | End: 2019-06-11

## 2018-12-04 RX ORDER — LUTEIN 10 MG
TABLET ORAL
COMMUNITY
End: 2020-04-21

## 2018-12-04 ASSESSMENT — ANXIETY QUESTIONNAIRES
GAD7 TOTAL SCORE: 1
4. TROUBLE RELAXING: NOT AT ALL
7. FEELING AFRAID AS IF SOMETHING AWFUL MIGHT HAPPEN: NOT AT ALL
7. FEELING AFRAID AS IF SOMETHING AWFUL MIGHT HAPPEN: NOT AT ALL
3. WORRYING TOO MUCH ABOUT DIFFERENT THINGS: NOT AT ALL
5. BEING SO RESTLESS THAT IT IS HARD TO SIT STILL: NOT AT ALL
GAD7 TOTAL SCORE: 1
GAD7 TOTAL SCORE: 1
2. NOT BEING ABLE TO STOP OR CONTROL WORRYING: NOT AT ALL
6. BECOMING EASILY ANNOYED OR IRRITABLE: NOT AT ALL
1. FEELING NERVOUS, ANXIOUS, OR ON EDGE: SEVERAL DAYS

## 2018-12-04 ASSESSMENT — PATIENT HEALTH QUESTIONNAIRE - PHQ9
SUM OF ALL RESPONSES TO PHQ QUESTIONS 1-9: 0
SUM OF ALL RESPONSES TO PHQ QUESTIONS 1-9: 0
10. IF YOU CHECKED OFF ANY PROBLEMS, HOW DIFFICULT HAVE THESE PROBLEMS MADE IT FOR YOU TO DO YOUR WORK, TAKE CARE OF THINGS AT HOME, OR GET ALONG WITH OTHER PEOPLE: NOT DIFFICULT AT ALL

## 2018-12-04 NOTE — PROGRESS NOTES
SUBJECTIVE:   Stephanie Trejo is a 58 year old female who presents to clinic today for the following health issues:    PHQ-9 ([employer]) 12/4/2018   1.  Little interest or pleasure in doing things 0   2.  Feeling down, depressed, or hopeless 0   3.  Trouble falling or staying asleep, or sleeping too much 0   4.  Feeling tired or having little energy 0   5.  Poor appetite or overeating 0   6.  Feeling bad about yourself 0   7.  Trouble concentrating 0   8.  Moving slowly or restless 0   9.  Suicidal or self-harm thoughts 0   PHQ-9 Total Score 0     SHERON-7   [employer], 2002; Used with Permission) 12/4/2018   1. Feeling nervous, anxious, or on edge 1   2. Not being able to stop or control worrying 0   3. Worrying too much about different things 0   4. Trouble relaxing 0   5. Being so restless that it is hard to sit still 0   6. Becoming easily annoyed or irritable 0   7. Feeling afraid, as if something awful might happen 0   SHERON-7 Total Score 1           Hypothyroidism Follow-up      Since last visit, patient describes the following symptoms: Weight stable, no hair loss, no skin changes, no constipation, no loose stools      Amount of exercise or physical activity: 4-5 days/week for an average of 45-60 minutes    Problems taking medications regularly: No    Medication side effects: none    Diet: regular (no restrictions)        Hypertension Follow-up      Outpatient blood pressures are not being checked.    Low Salt Diet: no added salt  Patient presents for evaluation of hypertension.  She indicates that she is feeling well and denies any symptoms referable to her elevated blood pressure. Specifically denies chest pain, palpitations, dyspnea, orthopnea, PND or peripheral edema. Current medication regimen is as listed below. Patient denies any side effects of medication.        Depression and Anxiety Follow-Up    Status since last visit: No change    Other associated symptoms:None    Complicating factors:  "    Significant life event: No     Current substance abuse: None    PHQ 11/27/2017 5/17/2018 12/4/2018   PHQ-9 Total Score 7 2 0   Q9: Suicide Ideation Not at all Not at all Not at all     SHERON-7 SCORE 11/27/2017 5/17/2018 12/4/2018   Total Score - - -   Total Score - 2 (minimal anxiety) 1 (minimal anxiety)   Total Score 3 2 1     In the past two weeks have you had thoughts of suicide or self-harm?  No.    Do you have concerns about your personal safety or the safety of others?   No  PHQ-9  English  PHQ-9   Any Language  SHERON-7  Suicide Assessment Five-step Evaluation and Treatment (SAFE-T)    Problem list and histories reviewed & adjusted, as indicated.  Additional history: as documented    Labs reviewed in EPIC    Reviewed and updated as needed this visit by clinical staff  Tobacco  Allergies  Meds  Problems  Med Hx  Surg Hx  Fam Hx  Soc Hx        Reviewed and updated as needed this visit by Provider  Tobacco  Allergies  Meds  Problems  Med Hx  Surg Hx  Fam Hx         ROS:  Constitutional, HEENT, cardiovascular, pulmonary, gi and gu systems are negative, except as otherwise noted.    OBJECTIVE:                                                    /80 (BP Location: Right arm, Patient Position: Chair, Cuff Size: Adult Large)   Pulse 63   Temp 98  F (36.7  C) (Tympanic)   Ht 1.803 m (5' 11\")   Wt 89.5 kg (197 lb 6.4 oz)   LMP 09/21/2012   SpO2 98%   BMI 27.53 kg/m     Body mass index is 27.53 kg/m .  GENERAL APPEARANCE: healthy, alert and no distress  NECK: no adenopathy, no asymmetry, masses, or scars and thyroid normal to palpation  RESP: lungs clear to auscultation - no rales, rhonchi or wheezes  CV: regular rates and rhythm, normal S1 S2, no S3 or S4 and no murmur, click or rub  MS: extremities normal- no gross deformities noted  PSYCH: mentation appears normal and affect normal/bright         ASSESSMENT/PLAN:                                                    1. Hypothyroidism, unspecified " type  Stable no change in treatment plan.   - levothyroxine (SYNTHROID/LEVOTHROID) 125 MCG tablet; Take 1 tablet (125 mcg) by mouth daily  Dispense: 90 tablet; Refill: 3    2. Psychophysiological insomnia  Stable no change in treatment plan.   - temazepam (RESTORIL) 15 MG capsule; Take 1-2 capsules (15-30 mg) by mouth nightly as needed for sleep  Dispense: 60 capsule; Refill: 5    3. Anxiety/Depression   Stable no change in treatment plan.   - temazepam (RESTORIL) 15 MG capsule; Take 1-2 capsules (15-30 mg) by mouth nightly as needed for sleep  Dispense: 60 capsule; Refill: 5    4. CAMELIA (obstructive sleep apnea)  Stable   - CBC with platelets differential; Future    5. Essential hypertension  Not well controlled now   Will call in with BPs       Patient Instructions   Check lab in 3 months does not need to be fasting     Check BP as often as you can in the next month and send me [employer] message with readings    No other changes in meds               Risks, benefits, side effects and rationale for treatment plan fully discussed with the patient and understanding expressed.   Rad[facility] Felton MD  [facility]

## 2018-12-04 NOTE — NURSING NOTE
"Chief Complaint   Patient presents with     Thyroid Disease     Heart Failure       Initial /80 (BP Location: Right arm, Patient Position: Chair, Cuff Size: Adult Large)  Pulse 63  Temp 98  F (36.7  C) (Tympanic)  Ht 5' 11\" (1.803 m)  Wt 197 lb 6.4 oz (89.5 kg)  LMP 09/21/2012  SpO2 98%  BMI 27.53 kg/m2 Estimated body mass index is 27.53 kg/(m^2) as calculated from the following:    Height as of this encounter: 5' 11\" (1.803 m).    Weight as of this encounter: 197 lb 6.4 oz (89.5 kg).    Patient presents to the clinic using No DME    Health Maintenance that is potentially due pending provider review:  NONE    n/a    Is there anyone who you would like to be able to receive your results? No  If yes have patient fill out VERA    "

## 2018-12-04 NOTE — MR AVS SNAPSHOT
After Visit Summary   12/4/2018    Stephanie Trejo    MRN: [id number]           Patient Information     Date Of Birth          1960        Visit Information        Provider Department      12/4/2018 2:40 PM Radha Felton MD [facility]        Today's Diagnoses     CAMELIA (obstructive sleep apnea)    -  1    Hypothyroidism, unspecified type        Psychophysiological insomnia        Anxiety          Care Instructions    Check lab in 3 months does not need to be fasting     Check BP as often as you can in the next month and send me [employer] message with readings    No other changes in meds                     Follow-ups after your visit        Future tests that were ordered for you today     Open Future Orders        Priority Expected Expires Ordered    CBC with platelets differential Routine 12/5/2018 4/4/2019 12/4/2018            Who to contact     If you have questions or need follow up information about today's clinic visit or your schedule please contact [facility] directly at [phone].  Normal or non-critical lab and imaging results will be communicated to you by [employer], letter or phone within 4 business days after the clinic has received the results. If you do not hear from us within 7 days, please contact the clinic through [employer] or phone. If you have a critical or abnormal lab result, we will notify you by phone as soon as possible.  Submit refill requests through [employer] or call your pharmacy and they will forward the refill request to us. Please allow 3 business days for your refill to be completed.          Additional Information About Your Visit        Nimiahart Information     [employer] gives you secure access to your electronic health record. If you see a primary care provider, you can also send messages to your care team and make appointments. If you have questions, please call your primary care clinic.  If you do not have a primary care  "provider, please call [phone] and they will assist you.        Care EveryWhere ID     This is your Care EveryWhere ID. This could be used by other organizations to access your [place] medical records  [id number]        Your Vitals Were     Pulse Temperature Height Last Period Pulse Oximetry BMI (Body Mass Index)    63 98  F (36.7  C) (Tympanic) 5' 11\" (1.803 m) 09/21/2012 98% 27.53 kg/m2       Blood Pressure from Last 3 Encounters:   12/04/18 172/80   08/28/18 156/74   03/30/18 132/80    Weight from Last 3 Encounters:   12/04/18 197 lb 6.4 oz (89.5 kg)   03/30/18 196 lb (88.9 kg)   11/27/17 186 lb 12.8 oz (84.7 kg)                 Where to get your medicines      These medications were sent to [facility] [address] [place] [address] AT [address]  [address] [facility] [address]     Phone:  [phone]     levothyroxine 125 MCG tablet         Some of these will need a paper prescription and others can be bought over the counter.  Ask your nurse if you have questions.     Bring a paper prescription for each of these medications     temazepam 15 MG capsule          Primary Care Provider Office Phone # Fax #    Radha Felton [phone] [address] [facility] [address]        Equal Access to Services     CRUZ OLMEDO AH: Hubert buchanano [place], waaxda luqadaha, qaybta kaalmavirgen cuenca. So [facility] [phone].    ATENCIÓN: Si habla español, tiene a ross disposición servicios gratuitos de asistencia lingüística. Llame al [phone].    We comply with applicable federal civil rights laws and [place] laws. We do not discriminate on the basis of race, color, national origin, age, disability, sex, sexual orientation, or gender identity.            Thank you!     Thank you for choosing [facility]  for your care. Our goal is always to provide you with excellent " care. Hearing back from our patients is one way we can continue to improve our services. Please take a few minutes to complete the written survey that you may receive in the mail after your visit with us. Thank you!             Your Updated Medication List - Protect others around you: Learn how to safely use, store and throw away your medicines at www.disposemymeds.org.          This list is accurate as of 12/4/18  3:26 PM.  Always use your most recent med list.                   Brand Name Dispense Instructions for use Diagnosis    aspirin 81 MG chewable tablet    ASA    90 tablet    Take 1 tablet by mouth daily.    Mild major depression (H)       levothyroxine 125 MCG tablet    SYNTHROID/LEVOTHROID    90 tablet    Take 1 tablet (125 mcg) by mouth daily    Hypothyroidism, unspecified type       Lutein 10 MG Tabs           MULTIVITAMIN/MINERALS PO      one tablet daily        temazepam 15 MG capsule    RESTORIL    60 capsule    Take 1-2 capsules (15-30 mg) by mouth nightly as needed for sleep    Psychophysiological insomnia, Anxiety

## 2018-12-04 NOTE — PATIENT INSTRUCTIONS
Check lab in 3 months does not need to be fasting     Check BP as often as you can in the next month and send me [employer] message with readings    No other changes in meds

## 2018-12-05 ASSESSMENT — ANXIETY QUESTIONNAIRES: GAD7 TOTAL SCORE: 1

## 2018-12-05 ASSESSMENT — PATIENT HEALTH QUESTIONNAIRE - PHQ9: SUM OF ALL RESPONSES TO PHQ QUESTIONS 1-9: 0

## 2018-12-11 ENCOUNTER — MYC MEDICAL ADVICE (OUTPATIENT)
Dept: FAMILY MEDICINE | Facility: CLINIC | Age: 58
End: 2018-12-11

## 2018-12-11 ENCOUNTER — ALLIED HEALTH/NURSE VISIT (OUTPATIENT)
Dept: FAMILY MEDICINE | Facility: CLINIC | Age: 58
End: 2018-12-11
Payer: COMMERCIAL

## 2018-12-11 VITALS
OXYGEN SATURATION: 95 % | DIASTOLIC BLOOD PRESSURE: 92 MMHG | HEART RATE: 64 BPM | RESPIRATION RATE: 20 BRPM | SYSTOLIC BLOOD PRESSURE: 150 MMHG

## 2018-12-11 DIAGNOSIS — I10 ESSENTIAL HYPERTENSION: Primary | ICD-10-CM

## 2018-12-11 PROCEDURE — 99207 ZZC NO CHARGE NURSE ONLY: CPT

## 2018-12-11 RX ORDER — LISINOPRIL 20 MG/1
20 TABLET ORAL DAILY
Qty: 30 TABLET | Refills: 0 | Status: SHIPPED | OUTPATIENT
Start: 2018-12-11 | End: 2019-01-04

## 2018-12-11 RX ORDER — UREA 10 %
500 LOTION (ML) TOPICAL 2 TIMES DAILY
Qty: 180 TABLET | Refills: 3 | COMMUNITY
Start: 2018-12-11 | End: 2020-04-21

## 2018-12-11 NOTE — NURSING NOTE
Patient comes into the clinic today for a BP CK.  Medications and allergies are gone over with the patient and are up to date.  Tameka POZO RN

## 2018-12-11 NOTE — PROGRESS NOTES
Please call hari and let her know I think we need to restart BP meds lets try lisinopril 20 mg daily and repeat chem 8 and BP check in one week

## 2018-12-17 ENCOUNTER — MYC MEDICAL ADVICE (OUTPATIENT)
Dept: FAMILY MEDICINE | Facility: CLINIC | Age: 58
End: 2018-12-17

## 2018-12-17 DIAGNOSIS — I10 ESSENTIAL HYPERTENSION: Primary | ICD-10-CM

## 2018-12-18 ENCOUNTER — MYC MEDICAL ADVICE (OUTPATIENT)
Dept: FAMILY MEDICINE | Facility: CLINIC | Age: 58
End: 2018-12-18

## 2018-12-18 ENCOUNTER — ALLIED HEALTH/NURSE VISIT (OUTPATIENT)
Dept: FAMILY MEDICINE | Facility: CLINIC | Age: 58
End: 2018-12-18
Payer: COMMERCIAL

## 2018-12-18 VITALS — SYSTOLIC BLOOD PRESSURE: 135 MMHG | DIASTOLIC BLOOD PRESSURE: 86 MMHG

## 2018-12-18 DIAGNOSIS — I10 ESSENTIAL HYPERTENSION: Primary | ICD-10-CM

## 2018-12-18 PROCEDURE — 99207 ZZC NO CHARGE NURSE ONLY: CPT | Performed by: FAMILY MEDICINE

## 2018-12-19 DIAGNOSIS — G47.33 OSA (OBSTRUCTIVE SLEEP APNEA): ICD-10-CM

## 2018-12-19 DIAGNOSIS — I10 ESSENTIAL HYPERTENSION: ICD-10-CM

## 2018-12-19 LAB
ANION GAP SERPL CALCULATED.3IONS-SCNC: 1 MMOL/L (ref 3–14)
BASOPHILS # BLD AUTO: 0 10E9/L (ref 0–0.2)
BASOPHILS NFR BLD AUTO: 0.4 %
BUN SERPL-MCNC: 18 MG/DL (ref 7–30)
CALCIUM SERPL-MCNC: 8.9 MG/DL (ref 8.5–10.1)
CHLORIDE SERPL-SCNC: 108 MMOL/L (ref 94–109)
CO2 SERPL-SCNC: 34 MMOL/L (ref 20–32)
CREAT SERPL-MCNC: 0.92 MG/DL (ref 0.52–1.04)
DIFFERENTIAL METHOD BLD: NORMAL
EOSINOPHIL # BLD AUTO: 0.1 10E9/L (ref 0–0.7)
EOSINOPHIL NFR BLD AUTO: 1.8 %
ERYTHROCYTE [DISTWIDTH] IN BLOOD BY AUTOMATED COUNT: 14 % (ref 10–15)
GFR SERPL CREATININE-BSD FRML MDRD: 67 ML/MIN/{1.73_M2}
GLUCOSE SERPL-MCNC: 78 MG/DL (ref 70–99)
HCT VFR BLD AUTO: 42.6 % (ref 35–47)
HGB BLD-MCNC: 13.9 G/DL (ref 11.7–15.7)
LYMPHOCYTES # BLD AUTO: 1.5 10E9/L (ref 0.8–5.3)
LYMPHOCYTES NFR BLD AUTO: 29.2 %
MCH RBC QN AUTO: 30.8 PG (ref 26.5–33)
MCHC RBC AUTO-ENTMCNC: 32.6 G/DL (ref 31.5–36.5)
MCV RBC AUTO: 94 FL (ref 78–100)
MONOCYTES # BLD AUTO: 0.6 10E9/L (ref 0–1.3)
MONOCYTES NFR BLD AUTO: 12 %
NEUTROPHILS # BLD AUTO: 2.8 10E9/L (ref 1.6–8.3)
NEUTROPHILS NFR BLD AUTO: 56.6 %
PLATELET # BLD AUTO: 157 10E9/L (ref 150–450)
POTASSIUM SERPL-SCNC: 4.1 MMOL/L (ref 3.4–5.3)
RBC # BLD AUTO: 4.52 10E12/L (ref 3.8–5.2)
SODIUM SERPL-SCNC: 143 MMOL/L (ref 133–144)
WBC # BLD AUTO: 5 10E9/L (ref 4–11)

## 2018-12-19 PROCEDURE — 80048 BASIC METABOLIC PNL TOTAL CA: CPT | Performed by: FAMILY MEDICINE

## 2018-12-19 PROCEDURE — 36415 COLL VENOUS BLD VENIPUNCTURE: CPT | Performed by: FAMILY MEDICINE

## 2018-12-19 PROCEDURE — 85025 COMPLETE CBC W/AUTO DIFF WBC: CPT | Performed by: FAMILY MEDICINE

## 2018-12-31 ENCOUNTER — TELEPHONE (OUTPATIENT)
Dept: FAMILY MEDICINE | Facility: CLINIC | Age: 58
End: 2018-12-31

## 2018-12-31 ENCOUNTER — ALLIED HEALTH/NURSE VISIT (OUTPATIENT)
Dept: FAMILY MEDICINE | Facility: CLINIC | Age: 58
End: 2018-12-31
Payer: COMMERCIAL

## 2018-12-31 VITALS — OXYGEN SATURATION: 96 % | SYSTOLIC BLOOD PRESSURE: 138 MMHG | DIASTOLIC BLOOD PRESSURE: 72 MMHG | HEART RATE: 60 BPM

## 2018-12-31 DIAGNOSIS — Z01.30 BP CHECK: Primary | ICD-10-CM

## 2018-12-31 PROCEDURE — 99207 ZZC NO CHARGE NURSE ONLY: CPT

## 2018-12-31 NOTE — NURSING NOTE
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  She believes this may be related to her eyes and plans to make an eye appointment to have this evaluated.    BP's in Rn clinic today:        BP: 138/72 Pulse: 60     SpO2: 96 %         Routing to provider for review    Mel PACHECO Rn

## 2019-01-04 DIAGNOSIS — I10 ESSENTIAL HYPERTENSION: ICD-10-CM

## 2019-01-04 RX ORDER — LISINOPRIL 20 MG/1
TABLET ORAL
Qty: 90 TABLET | Refills: 1 | Status: SHIPPED | OUTPATIENT
Start: 2019-01-04 | End: 2019-02-18

## 2019-01-04 NOTE — TELEPHONE ENCOUNTER
"Requested Prescriptions   Pending Prescriptions Disp Refills     lisinopril (PRINIVIL/ZESTRIL) 20 MG tablet [Pharmacy Med Name: LISINOPRIL 20MG TABLETS] 30 tablet 0     Sig: TAKE 1 TABLET BY MOUTH DAILY    ACE Inhibitors (Including Combos) Protocol Passed - 1/4/2019  7:25 AM       Passed - Blood pressure under 140/90 in past 12 months    BP Readings from Last 3 Encounters:   12/31/18 138/72   12/18/18 135/86   12/11/18 (!) 150/92                Passed - Recent (12 mo) or future (30 days) visit within the authorizing provider's specialty    Patient had office visit in the last 12 months or has a visit in the next 30 days with authorizing provider or within the authorizing provider's specialty.  See \"Patient Info\" tab in inbasket, or \"Choose Columns\" in Meds & Orders section of the refill encounter.             Passed - Patient is age 18 or older       Passed - No active pregnancy on record       Passed - Normal serum creatinine on file in past 12 months    Recent Labs   Lab Test 12/19/18  1112   CR 0.92            Passed - Normal serum potassium on file in past 12 months    Recent Labs   Lab Test 12/19/18  1112   POTASSIUM 4.1            Passed - No positive pregnancy test in past 12 months        lisinopril (PRINIVIL/ZESTRIL) 20 MG tablet  Last Written Prescription Date:  12/11/2018  Last Fill Quantity: 30 tablet,  # refills: 0   Last office visit: 12/31/2018 with prescribing provider:  12/04/2018 YAJAIRA Felton   Future Office Visit:      Kristy PABLO (VANNESSA) (M)    "

## 2019-01-29 ENCOUNTER — MYC MEDICAL ADVICE (OUTPATIENT)
Dept: FAMILY MEDICINE | Facility: CLINIC | Age: 59
End: 2019-01-29

## 2019-02-05 ENCOUNTER — MYC MEDICAL ADVICE (OUTPATIENT)
Dept: FAMILY MEDICINE | Facility: CLINIC | Age: 59
End: 2019-02-05

## 2019-02-05 ENCOUNTER — OFFICE VISIT (OUTPATIENT)
Dept: FAMILY MEDICINE | Facility: CLINIC | Age: 59
End: 2019-02-05
Payer: COMMERCIAL

## 2019-02-05 VITALS
WEIGHT: 195.2 LBS | SYSTOLIC BLOOD PRESSURE: 148 MMHG | RESPIRATION RATE: 20 BRPM | DIASTOLIC BLOOD PRESSURE: 94 MMHG | OXYGEN SATURATION: 95 % | BODY MASS INDEX: 27.94 KG/M2 | TEMPERATURE: 98.2 F | HEART RATE: 80 BPM | HEIGHT: 70 IN

## 2019-02-05 DIAGNOSIS — J40 SINOBRONCHITIS: Primary | ICD-10-CM

## 2019-02-05 DIAGNOSIS — J32.9 SINOBRONCHITIS: Primary | ICD-10-CM

## 2019-02-05 PROCEDURE — 99214 OFFICE O/P EST MOD 30 MIN: CPT | Performed by: PHYSICIAN ASSISTANT

## 2019-02-05 RX ORDER — DOXYCYCLINE 100 MG/1
100 CAPSULE ORAL 2 TIMES DAILY
Qty: 20 CAPSULE | Refills: 0 | Status: SHIPPED | OUTPATIENT
Start: 2019-02-05 | End: 2019-02-18

## 2019-02-05 RX ORDER — PREDNISONE 20 MG/1
20 TABLET ORAL DAILY
Qty: 5 TABLET | Refills: 0 | Status: SHIPPED | OUTPATIENT
Start: 2019-02-05 | End: 2019-02-18

## 2019-02-05 RX ORDER — ALBUTEROL SULFATE 90 UG/1
AEROSOL, METERED RESPIRATORY (INHALATION)
Qty: 1 INHALER | Refills: 0 | Status: SHIPPED | OUTPATIENT
Start: 2019-02-05 | End: 2019-02-18

## 2019-02-05 ASSESSMENT — ENCOUNTER SYMPTOMS
SHORTNESS OF BREATH: 0
FEVER: 0
PALPITATIONS: 0
COUGH: 1
CHILLS: 0
EYE DISCHARGE: 0
NAUSEA: 0
EYE REDNESS: 0
WHEEZING: 0
SORE THROAT: 0
DIARRHEA: 0
HEADACHES: 0
BLURRED VISION: 0
VOMITING: 0
SINUS PAIN: 1
ABDOMINAL PAIN: 0
MYALGIAS: 0

## 2019-02-05 ASSESSMENT — MIFFLIN-ST. JEOR: SCORE: 1535.67

## 2019-02-05 NOTE — PROGRESS NOTES
SUBJECTIVE:   Stephanie Trejo is a 58 year old female who presents to clinic today for the following health issues:      Acute Illness   Acute illness concerns: Cough   Onset: 1.5 weeks     Fever: no    Chills/Sweats: no    Headache (location?): YES    Sinus Pressure:YES- post-nasal drainage, facial pain and teeth pain    Conjunctivitis:  no    Ear Pain: no    Rhinorrhea: no    Congestion: YES    Sore Throat: YES     Cough: YES-non-productive, with shortness of breath. Patient states that she is prone to pneumonia and has some chest tightness.     Wheeze: no    Decreased Appetite: YES    Nausea: no    Vomiting: no    Diarrhea:  no    Dysuria/Freq.: no    Fatigue/Achiness: YES    Sick/Strep Exposure: no     Therapies Tried and outcome: mucinex, cough Supressant         Problem list and histories reviewed & adjusted, as indicated.  Additional history: as documented    Patient Active Problem List   Diagnosis     Hypothyroidism     Other and unspecified disc disorder of lumbar region     Essential hypertension     CAMELIA (obstructive sleep apnea)     CARDIOVASCULAR SCREENING; LDL GOAL LESS THAN 100     Mitral valve regurgitation     Anaplasmosis     Atrial fibrillation (H)     Abnormal uterine bleeding     Mild major depression (H)     CHF (congestive heart failure) (H)     Counseling regarding advanced directives     Anxiety     Past Surgical History:   Procedure Laterality Date     BACK SURGERY       COLONOSCOPY       GYN SURGERY       SOFT TISSUE SURGERY       SURGICAL HISTORY OF -            SURGICAL HISTORY OF -       Laparoscopy     SURGICAL HISTORY OF -       Hernia Repair      SURGICAL HISTORY OF -       Back Fusion T3 - L3       Social History     Tobacco Use     Smoking status: Never Smoker     Smokeless tobacco: Never Used   Substance Use Topics     Alcohol use: No     Alcohol/week: 0.0 oz     Family History   Problem Relation Age of Onset     Lipids Mother      Thyroid Disease Mother       Eye Disorder Mother         macular degeneration     Colon Cancer Mother      Anesthesia Reaction Mother         allergic to some     Hypertension Father      Cardiovascular Father         MI     Osteoporosis Father      Alzheimer Disease Father      Genitourinary Problems Father         incontinence     Breast Cancer Maternal Grandmother      Cerebrovascular Disease Maternal Grandfather      Diabetes Paternal Grandmother      Gastrointestinal Disease Sister         colitis     Respiratory Daughter         asthma         Current Outpatient Medications   Medication Sig Dispense Refill     albuterol (PROAIR HFA/PROVENTIL HFA/VENTOLIN HFA) 108 (90 Base) MCG/ACT inhaler Inhale 2 puffs every 4-6 hours as needed for cough, wheezing, or shortness of breath 1 Inhaler 0     aspirin 81 MG chewable tablet Take 1 tablet by mouth daily. 90 tablet      doxycycline monohydrate (MONODOX) 100 MG capsule Take 1 capsule (100 mg) by mouth 2 times daily for 10 days 20 capsule 0     levothyroxine (SYNTHROID/LEVOTHROID) 125 MCG tablet Take 1 tablet (125 mcg) by mouth daily 90 tablet 3     Lutein 10 MG TABS        magnesium gluconate (MAGONATE) 500 (27 Mg) MG tablet Take 1 tablet (500 mg) by mouth 2 times daily 180 tablet 3     MULTIVITAMIN/MINERALS PO one tablet daily       predniSONE (DELTASONE) 20 MG tablet Take 20 mg by mouth daily for 5 days. 5 tablet 0     temazepam (RESTORIL) 15 MG capsule Take 1-2 capsules (15-30 mg) by mouth nightly as needed for sleep 60 capsule 5     lisinopril (PRINIVIL/ZESTRIL) 20 MG tablet TAKE 1 TABLET BY MOUTH DAILY (Patient not taking: Reported on 2/5/2019) 90 tablet 1     Allergies   Allergen Reactions     Amlodipine Besylate Swelling     Swelling in ankles, Racing pulse also     Ammonia Hives and Swelling     Penicillins Rash     Labs reviewed in EPIC    Reviewed and updated as needed this visit by clinical staff  Tobacco  Allergies  Meds  Problems  Med Hx  Surg Hx  Fam Hx  Soc Hx       "  Reviewed and updated as needed this visit by Provider  Tobacco  Allergies  Meds  Problems  Med Hx  Surg Hx  Fam Hx         ROS:  Review of Systems   Constitutional: Positive for malaise/fatigue. Negative for chills and fever.   HENT: Positive for congestion and sinus pain. Negative for ear pain and sore throat.    Eyes: Negative for blurred vision, discharge and redness.   Respiratory: Positive for cough. Negative for shortness of breath and wheezing.    Cardiovascular: Negative for chest pain and palpitations.   Gastrointestinal: Negative for abdominal pain, diarrhea, nausea and vomiting.   Musculoskeletal: Negative for joint pain and myalgias.   Skin: Negative for rash.   Neurological: Negative for headaches.         OBJECTIVE:     BP (!) 148/94   Pulse 80   Temp 98.2  F (36.8  C) (Tympanic)   Resp 20   Ht 1.778 m (5' 10\")   Wt 88.5 kg (195 lb 3.2 oz)   LMP 09/21/2012   SpO2 95%   BMI 28.01 kg/m    Body mass index is 28.01 kg/m .    Physical Exam   Constitutional: She appears well-developed and well-nourished.   HENT:   Head: Normocephalic.   Right Ear: Tympanic membrane and ear canal normal.   Left Ear: Tympanic membrane and ear canal normal.   Nose: Mucosal edema and rhinorrhea present.   Mouth/Throat: Posterior oropharyngeal erythema present. No oropharyngeal exudate.   Eyes: Conjunctivae are normal. Pupils are equal, round, and reactive to light.   Cardiovascular: Normal rate and normal heart sounds.   Pulmonary/Chest: Effort normal and breath sounds normal.   Frequent dry reactive cough; otherwise clear    Skin: Skin is warm and dry. No rash noted.   Psychiatric: She has a normal mood and affect. Her behavior is normal.       Diagnostic Test Results:  none     ASSESSMENT/PLAN:       1. Sinobronchitis  Will treat with doxycyline 100mg two times daily x 10 days. Also prescribed prednisone 20mg once daily x 5 days and albuterol 2 puffs every 4-6hrs as needed. Discussed how to take/use these " medications and what to expect. Get plenty of rest and push fluids. Can use Tylenol and/or ibuprofen as needed for pain and/or fever control. Return to clinic if symptoms worsen or do not improve; otherwise follow up as needed       - doxycycline monohydrate (MONODOX) 100 MG capsule; Take 1 capsule (100 mg) by mouth 2 times daily for 10 days  Dispense: 20 capsule; Refill: 0  - predniSONE (DELTASONE) 20 MG tablet; Take 20 mg by mouth daily for 5 days.  Dispense: 5 tablet; Refill: 0  - albuterol (PROAIR HFA/PROVENTIL HFA/VENTOLIN HFA) 108 (90 Base) MCG/ACT inhaler; Inhale 2 puffs every 4-6 hours as needed for cough, wheezing, or shortness of breath  Dispense: 1 Inhaler; Refill: 0       Elza Rudd PA-C  [facility]

## 2019-02-18 ENCOUNTER — TELEPHONE (OUTPATIENT)
Dept: FAMILY MEDICINE | Facility: CLINIC | Age: 59
End: 2019-02-18

## 2019-02-18 ENCOUNTER — MYC MEDICAL ADVICE (OUTPATIENT)
Dept: FAMILY MEDICINE | Facility: CLINIC | Age: 59
End: 2019-02-18

## 2019-02-18 ENCOUNTER — ALLIED HEALTH/NURSE VISIT (OUTPATIENT)
Dept: FAMILY MEDICINE | Facility: CLINIC | Age: 59
End: 2019-02-18
Payer: COMMERCIAL

## 2019-02-18 VITALS — HEART RATE: 58 BPM | SYSTOLIC BLOOD PRESSURE: 128 MMHG | DIASTOLIC BLOOD PRESSURE: 74 MMHG

## 2019-02-18 DIAGNOSIS — I10 ESSENTIAL HYPERTENSION: Primary | ICD-10-CM

## 2019-02-18 DIAGNOSIS — S89.90XA KNEE INJURY, UNSPECIFIED LATERALITY, INITIAL ENCOUNTER: Primary | ICD-10-CM

## 2019-02-18 PROCEDURE — 99207 ZZC NO CHARGE NURSE ONLY: CPT

## 2019-02-18 NOTE — NURSING NOTE
Stephanie Trejo is a 58 year old year old patient who comes in today for a Blood Pressure check because of ongoing blood pressure monitoring.  Pt stopped lisinopril last month due to feeling dizzy and headaches on it.  She returns today for recheck.  Pt is not on any antihypertension medications.    Pt is feeling much improved from recent bout of sinobronchitis as well.  Slight sinus congestion remains.  Otherwise, resolved.    Vital Signs as repeated by RN:   140/84, pulse of 76  128/74, pulse of 58 5 min later.  Patient is not taking BP medication.    Current complaints: none  Disposition:  Will route to provider in a telephone note.   Pt prefers a reply by MyChart from provider for further recommendations.    Last office visit with Dr Felton is 12/4/18.    Shauna Colunga RN      BP Readings from Last 6 Encounters:   02/18/19 128/74   02/05/19 (!) 148/94   12/31/18 138/72   12/18/18 135/86   12/11/18 (!) 150/92   12/04/18 172/80     Lab Results   Component Value Date    CR 0.92 12/19/2018     Lab Results   Component Value Date    POTASSIUM 4.1 12/19/2018

## 2019-02-18 NOTE — TELEPHONE ENCOUNTER
Stephanie Trejo is a 58 year old year old patient who comes in today for a Blood Pressure check because of ongoing blood pressure monitoring.  Pt stopped lisinopril last month due to feeling dizzy and headaches on it.  Dizziness and headaches are resolved since stopping lisinopril.  She returns today for recheck.  Pt is not on any antihypertension medications.     Pt is feeling much improved from recent bout of sinobronchitis as well.  Slight sinus congestion remains.  Otherwise, resolved.     Vital Signs as repeated by RN:   140/84, pulse of 76  128/74, pulse of 58 5 min later.  Patient is not taking BP medication.    Current complaints: none  Disposition:  Will route to provider in a telephone note.   Pt prefers a reply by MyChart from provider for further recommendations.     Last office visit with Dr Felton is 12/4/18.     Shauna Colunga RN            BP Readings from Last 6 Encounters:   02/18/19 128/74   02/05/19 (!) 148/94   12/31/18 138/72   12/18/18 135/86   12/11/18 (!) 150/92   12/04/18 172/80            Lab Results   Component Value Date     CR 0.92 12/19/2018            Lab Results   Component Value Date     POTASSIUM 4.1 12/19/2018

## 2019-02-22 ENCOUNTER — ALLIED HEALTH/NURSE VISIT (OUTPATIENT)
Dept: FAMILY MEDICINE | Facility: CLINIC | Age: 59
End: 2019-02-22
Payer: COMMERCIAL

## 2019-02-22 VITALS — RESPIRATION RATE: 16 BRPM | DIASTOLIC BLOOD PRESSURE: 82 MMHG | HEART RATE: 72 BPM | SYSTOLIC BLOOD PRESSURE: 136 MMHG

## 2019-02-22 DIAGNOSIS — I10 ESSENTIAL HYPERTENSION: Primary | ICD-10-CM

## 2019-02-22 PROCEDURE — 99207 ZZC NO CHARGE NURSE ONLY: CPT

## 2019-02-26 ENCOUNTER — OFFICE VISIT (OUTPATIENT)
Dept: ORTHOPEDICS | Facility: CLINIC | Age: 59
End: 2019-02-26
Payer: COMMERCIAL

## 2019-02-26 ENCOUNTER — HOSPITAL ENCOUNTER (OUTPATIENT)
Dept: MRI IMAGING | Facility: CLINIC | Age: 59
Discharge: HOME | End: 2019-02-26
Attending: PEDIATRICS | Admitting: PEDIATRICS
Payer: COMMERCIAL

## 2019-02-26 ENCOUNTER — TELEPHONE (OUTPATIENT)
Dept: ORTHOPEDICS | Facility: CLINIC | Age: 59
End: 2019-02-26

## 2019-02-26 ENCOUNTER — ANCILLARY PROCEDURE (OUTPATIENT)
Dept: GENERAL RADIOLOGY | Facility: CLINIC | Age: 59
End: 2019-02-26
Attending: PEDIATRICS
Payer: COMMERCIAL

## 2019-02-26 VITALS
HEIGHT: 70 IN | WEIGHT: 195 LBS | BODY MASS INDEX: 27.92 KG/M2 | DIASTOLIC BLOOD PRESSURE: 82 MMHG | SYSTOLIC BLOOD PRESSURE: 160 MMHG

## 2019-02-26 DIAGNOSIS — M25.561 RIGHT KNEE PAIN: ICD-10-CM

## 2019-02-26 DIAGNOSIS — M25.561 ACUTE PAIN OF RIGHT KNEE: ICD-10-CM

## 2019-02-26 DIAGNOSIS — M25.561 ACUTE PAIN OF RIGHT KNEE: Primary | ICD-10-CM

## 2019-02-26 PROCEDURE — 73721 MRI JNT OF LWR EXTRE W/O DYE: CPT | Mod: RT

## 2019-02-26 PROCEDURE — 99244 OFF/OP CNSLTJ NEW/EST MOD 40: CPT | Performed by: PEDIATRICS

## 2019-02-26 PROCEDURE — 73562 X-RAY EXAM OF KNEE 3: CPT | Mod: RT

## 2019-02-26 ASSESSMENT — MIFFLIN-ST. JEOR: SCORE: 1534.76

## 2019-02-26 NOTE — PATIENT INSTRUCTIONS
Plan:  - Today's Plan of Care:  MRI of the right knee. Call [phone] to schedule MRI  Continue with relative rest and activity modification, Ice, Compression, and Elevation.  Can apply ice 10-15 minutes 3-4 times per day as needed.  Home exercise program for motion and strength    -We also discussed other future treatment options:  Referral to orthopedic surgeon  Physical therapy    Follow Up: In clinic with Dr. Mata after MRI (wait at least 1-2 days)    If you have any further questions for your physician or physician s care team you can call [phone] and use option 3 to leave a voice message. Calls received during business hours will be returned same day.

## 2019-02-26 NOTE — LETTER
2/26/2019         RE: Stephanie Trejo  [address]  [facility] [address]        Dear Colleague,    Thank you for referring your patient, Stephanie Trejo, to the [facility]. Please see a copy of my visit note below.    Sports Medicine Clinic Visit    PCP: Radha Felton    Stephanie Trejo is a 58 year old female who is seen  in consultation at the request of  Radha Felton M.D. presenting with right knee pain    Injury: She reports right knee pain for 1 weeks. She feels she injured her knee while playing pickle ball but is unsure how it was injured. She reports that she had locking in her knee and pain in the lateral knee. The pain can radiate to her lateral shin. She has been using ice and a knee sleeve. Her pain increases with knee flexion, feels like it's going to lock.    Location of Pain: right knee  Duration of Pain: 1 week(s)  Rating of Pain at worst: 7/10  Rating of Pain Currently: 2/10  Symptoms are better with: Ice, Ibuprofen and Rest  Symptoms are worse with: flexion and stairs  Additional Features:   Positive: swelling, catching, locking and weakness   Negative: bruising, popping, grinding, instability, paresthesias and numbness  Other evaluation and/or treatments so far consists of: Ice and Rest  Prior History of related problems: nothing    Social History: retired    Review of Systems  Skin: no bruising, yes swelling  Musculoskeletal: as above  Neurologic: no numbness, paresthesias  Remainder of review of systems is negative including constitutional, CV, pulmonary, GI, except as noted in HPI or medical history.    Patient's current problem list, past medical and surgical history, and family history were reviewed.    Patient Active Problem List   Diagnosis     Hypothyroidism     Other and unspecified disc disorder of lumbar region     Essential hypertension     CAMELIA (obstructive sleep apnea)     CARDIOVASCULAR SCREENING; LDL GOAL LESS THAN 100  "    Mitral valve regurgitation     Anaplasmosis     Atrial fibrillation (H)     Abnormal uterine bleeding     Mild major depression (H)     CHF (congestive heart failure) (H)     Counseling regarding advanced directives     Anxiety     Past Medical History:   Diagnosis Date     CHF (congestive heart failure) (H) 2013     Depressive disorder      Hypertension      Mitral valve insufficiency, acquired      Other and unspecified disc disorder of lumbar region      Scoliosis (and kyphoscoliosis), idiopathic      Undiagnosed cardiac murmurs      Unspecified hypothyroidism      Past Surgical History:   Procedure Laterality Date     BACK SURGERY       COLONOSCOPY       GYN SURGERY       SOFT TISSUE SURGERY       SURGICAL HISTORY OF -            SURGICAL HISTORY OF -       Laparoscopy     SURGICAL HISTORY OF -       Hernia Repair      SURGICAL HISTORY OF -       Back Fusion T3 - L3     Family History   Problem Relation Age of Onset     Lipids Mother      Thyroid Disease Mother      Eye Disorder Mother         macular degeneration     Colon Cancer Mother      Anesthesia Reaction Mother         allergic to some     Hypertension Father      Cardiovascular Father         MI     Osteoporosis Father      Alzheimer Disease Father      Genitourinary Problems Father         incontinence     Breast Cancer Maternal Grandmother      Cerebrovascular Disease Maternal Grandfather      Diabetes Paternal Grandmother      Gastrointestinal Disease Sister         colitis     Respiratory Daughter         asthma         Objective  /82 (BP Location: Left arm, Patient Position: Chair, Cuff Size: Adult Regular)   Ht 1.778 m (5' 10\")   Wt 88.5 kg (195 lb)   LMP 2012   BMI 27.98 kg/m       GENERAL APPEARANCE: healthy, alert and no distress   GAIT: antalgic  SKIN: no suspicious lesions or rashes  HEENT: Sclera clear, anicteric  CV: good peripheral pulses  RESP: Breathing not labored  NEURO: Normal strength and " tone, mentation intact and speech normal  PSYCH:  mentation appears normal and affect normal/bright    Bilateral Knee exam    Inspection:      moderate effusion right    Patella:      Mobility -       hypomobile right       Crepitus noted in the patellofemoral joint right    Tender:      lateral patellar border right       lateral joint line right    Non Tender:      remainder of knee area right    Knee ROM:      Flexion 110 degrees right       Extension 10 degrees right    Strength:      5-/5 with knee extension right    Special Tests:     positive (+) Sarkis right       neg (-) anterior drawer right       neg (-) posterior drawer right       neg (-) varus at 0 deg and 30 deg right       neg (-) valgus at 0 deg and 30 deg right    Gait:      antalgic gait    Neurovascular:      2+ peripheral pulses bilaterally and brisk capillary refill       sensation grossly intact    Radiology  I ordered, visualized and reviewed these images with the patient  AP and sunrise bilateral and right lateral XR views of knees reviewed: mild patellar degenerative changes with right inferior calcification (patellar tendon calcification?)  - will follow official read    Assessment:  1. Acute pain of right knee      Acute right knee pain with locking, some concern for some meniscal tear so will obtain MRI.  Does have mild patellofemoral arthritis which could be contributing.  Discussed supportive care in the interim including rest, ice, compression and early home exercise program for motion and strength.    Plan:  - Today's Plan of Care:  MRI of the right knee. Call [phone] to schedule MRI  Continue with relative rest and activity modification, Ice, Compression, and Elevation.  Can apply ice 10-15 minutes 3-4 times per day as needed.  Home exercise program for motion and strength    -We also discussed other future treatment options:  Referral to orthopedic surgeon  Physical therapy    Follow Up: In clinic with Dr. Mata after MRI  (wait at least 1-2 days)    Concerning signs and symptoms were reviewed.  The patient expressed understanding of this management plan and all questions were answered at this time.    Thanks for the opportunity to participate in the care of this patient, I will keep you updated on their progress.    CC: Radha Mata MD [facility]  Primary Care Sports Medicine  [facility]    Again, thank you for allowing me to participate in the care of your patient.        Sincerely,        Lorie Mata MD

## 2019-02-26 NOTE — PROGRESS NOTES
Sports Medicine Clinic Visit    PCP: Radha Felton    Stephanie Trejo is a 58 year old female who is seen  in consultation at the request of  Radha Felton M.D. presenting with right knee pain    Injury: She reports right knee pain for 1 weeks. She feels she injured her knee while playing pickle ball but is unsure how it was injured. She reports that she had locking in her knee and pain in the lateral knee. The pain can radiate to her lateral shin. She has been using ice and a knee sleeve. Her pain increases with knee flexion, feels like it's going to lock.    Location of Pain: right knee  Duration of Pain: 1 week(s)  Rating of Pain at worst: 7/10  Rating of Pain Currently: 2/10  Symptoms are better with: Ice, Ibuprofen and Rest  Symptoms are worse with: flexion and stairs  Additional Features:   Positive: swelling, catching, locking and weakness   Negative: bruising, popping, grinding, instability, paresthesias and numbness  Other evaluation and/or treatments so far consists of: Ice and Rest  Prior History of related problems: nothing    Social History: retired    Review of Systems  Skin: no bruising, yes swelling  Musculoskeletal: as above  Neurologic: no numbness, paresthesias  Remainder of review of systems is negative including constitutional, CV, pulmonary, GI, except as noted in HPI or medical history.    Patient's current problem list, past medical and surgical history, and family history were reviewed.    Patient Active Problem List   Diagnosis     Hypothyroidism     Other and unspecified disc disorder of lumbar region     Essential hypertension     CAMELIA (obstructive sleep apnea)     CARDIOVASCULAR SCREENING; LDL GOAL LESS THAN 100     Mitral valve regurgitation     Anaplasmosis     Atrial fibrillation (H)     Abnormal uterine bleeding     Mild major depression (H)     CHF (congestive heart failure) (H)     Counseling regarding advanced directives     Anxiety     Past Medical History:  "  Diagnosis Date     CHF (congestive heart failure) (H) 2013     Depressive disorder      Hypertension      Mitral valve insufficiency, acquired      Other and unspecified disc disorder of lumbar region      Scoliosis (and kyphoscoliosis), idiopathic      Undiagnosed cardiac murmurs      Unspecified hypothyroidism      Past Surgical History:   Procedure Laterality Date     BACK SURGERY       COLONOSCOPY       GYN SURGERY       SOFT TISSUE SURGERY       SURGICAL HISTORY OF -            SURGICAL HISTORY OF -       Laparoscopy     SURGICAL HISTORY OF -       Hernia Repair      SURGICAL HISTORY OF -       Back Fusion T3 - L3     Family History   Problem Relation Age of Onset     Lipids Mother      Thyroid Disease Mother      Eye Disorder Mother         macular degeneration     Colon Cancer Mother      Anesthesia Reaction Mother         allergic to some     Hypertension Father      Cardiovascular Father         MI     Osteoporosis Father      Alzheimer Disease Father      Genitourinary Problems Father         incontinence     Breast Cancer Maternal Grandmother      Cerebrovascular Disease Maternal Grandfather      Diabetes Paternal Grandmother      Gastrointestinal Disease Sister         colitis     Respiratory Daughter         asthma         Objective  /82 (BP Location: Left arm, Patient Position: Chair, Cuff Size: Adult Regular)   Ht 1.778 m (5' 10\")   Wt 88.5 kg (195 lb)   LMP 2012   BMI 27.98 kg/m      GENERAL APPEARANCE: healthy, alert and no distress   GAIT: antalgic  SKIN: no suspicious lesions or rashes  HEENT: Sclera clear, anicteric  CV: good peripheral pulses  RESP: Breathing not labored  NEURO: Normal strength and tone, mentation intact and speech normal  PSYCH:  mentation appears normal and affect normal/bright    Bilateral Knee exam    Inspection:      moderate effusion right    Patella:      Mobility -       hypomobile right       Crepitus noted in the " patellofemoral joint right    Tender:      lateral patellar border right       lateral joint line right    Non Tender:      remainder of knee area right    Knee ROM:      Flexion 110 degrees right       Extension 10 degrees right    Strength:      5-/5 with knee extension right    Special Tests:     positive (+) Sarkis right       neg (-) anterior drawer right       neg (-) posterior drawer right       neg (-) varus at 0 deg and 30 deg right       neg (-) valgus at 0 deg and 30 deg right    Gait:      antalgic gait    Neurovascular:      2+ peripheral pulses bilaterally and brisk capillary refill       sensation grossly intact    Radiology  I ordered, visualized and reviewed these images with the patient  AP and sunrise bilateral and right lateral XR views of knees reviewed: mild patellar degenerative changes with right inferior calcification (patellar tendon calcification?)  - will follow official read    Assessment:  1. Acute pain of right knee      Acute right knee pain with locking, some concern for some meniscal tear so will obtain MRI.  Does have mild patellofemoral arthritis which could be contributing.  Discussed supportive care in the interim including rest, ice, compression and early home exercise program for motion and strength.    Plan:  - Today's Plan of Care:  MRI of the right knee. Call [phone] to schedule MRI  Continue with relative rest and activity modification, Ice, Compression, and Elevation.  Can apply ice 10-15 minutes 3-4 times per day as needed.  Home exercise program for motion and strength    -We also discussed other future treatment options:  Referral to orthopedic surgeon  Physical therapy    Follow Up: In clinic with Dr. Mata after MRI (wait at least 1-2 days)    Concerning signs and symptoms were reviewed.  The patient expressed understanding of this management plan and all questions were answered at this time.    Thanks for the opportunity to participate in the care of this  patient, I will keep you updated on their progress.    CC: Radha Felton M.D.     Lorie Mata MD [place]  Primary Care Sports Medicine  [facility]

## 2019-03-05 ENCOUNTER — OFFICE VISIT (OUTPATIENT)
Dept: ORTHOPEDICS | Facility: CLINIC | Age: 59
End: 2019-03-05
Payer: COMMERCIAL

## 2019-03-05 VITALS
HEIGHT: 70 IN | BODY MASS INDEX: 27.92 KG/M2 | DIASTOLIC BLOOD PRESSURE: 78 MMHG | SYSTOLIC BLOOD PRESSURE: 138 MMHG | WEIGHT: 195 LBS

## 2019-03-05 DIAGNOSIS — M23.203 DEGENERATIVE TEAR OF MEDIAL MENISCUS OF RIGHT KNEE: ICD-10-CM

## 2019-03-05 DIAGNOSIS — M25.561 ACUTE PAIN OF RIGHT KNEE: Primary | ICD-10-CM

## 2019-03-05 DIAGNOSIS — M23.300 DEGENERATIVE TEAR OF LATERAL MENISCUS OF RIGHT KNEE: ICD-10-CM

## 2019-03-05 DIAGNOSIS — M22.41 CHONDROMALACIA OF RIGHT PATELLA: ICD-10-CM

## 2019-03-05 PROCEDURE — 99213 OFFICE O/P EST LOW 20 MIN: CPT | Performed by: PEDIATRICS

## 2019-03-05 ASSESSMENT — MIFFLIN-ST. JEOR: SCORE: 1534.76

## 2019-03-05 NOTE — LETTER
3/5/2019         RE: Stephanie Trejo  [address]  [facility] [address]        Dear Colleague,    Thank you for referring your patient, Stephanie Trejo, to the [facility]. Please see a copy of my visit note below.    Sports Medicine Clinic Visit - Interim History March 5, 2019    PCP: Radha Felton    Stephanie Trejo is a 58 year old female who is seen in f/u up for Acute pain of right knee. Since last visit on 2/26/19 patient has completed a MRI of her right knee. She reports the knee swelling has improved due to ice and HEP. She reports pain continues in the lateral and medial knee. She reports difficulty with knee flexion and stairs.    Initial Injury History 2/26/2019: She reports right knee pain for 1 weeks. She feels she injured her knee while playing pickle ball but is unsure how it was injured. She reports that she had locking in her knee and pain in the lateral knee. The pain can radiate to her lateral shin. She has been using ice and a knee sleeve. Her pain increases with knee flexion, feels like it's going to lock.    Symptoms are better with: Ice, Ibuprofen and Rest  Symptoms are worse with: flexion and stairs  Additional Features:   Positive: swelling, catching, locking and weakness   Negative: bruising, popping, grinding, instability, paresthesias and numbness    Social History: retired    Review of Systems  Skin: bruising, mild swelling  Musculoskeletal: as above  Neurologic: no numbness, paresthesias  Remainder of review of systems is negative including constitutional, CV, pulmonary, GI, except as noted in HPI or medical history.    Patient's current problem list, past medical and surgical history, and family history were reviewed.    Patient Active Problem List   Diagnosis     Hypothyroidism     Other and unspecified disc disorder of lumbar region     Essential hypertension     CAMELIA (obstructive sleep apnea)     CARDIOVASCULAR SCREENING; LDL GOAL  "LESS THAN 100     Mitral valve regurgitation     Anaplasmosis     Atrial fibrillation (H)     Abnormal uterine bleeding     Mild major depression (H)     CHF (congestive heart failure) (H)     Counseling regarding advanced directives     Anxiety     Past Medical History:   Diagnosis Date     CHF (congestive heart failure) (H) 2013     Depressive disorder      Hypertension      Mitral valve insufficiency, acquired      Other and unspecified disc disorder of lumbar region      Scoliosis (and kyphoscoliosis), idiopathic      Undiagnosed cardiac murmurs      Unspecified hypothyroidism      Past Surgical History:   Procedure Laterality Date     BACK SURGERY       COLONOSCOPY       GYN SURGERY       SOFT TISSUE SURGERY       SURGICAL HISTORY OF -            SURGICAL HISTORY OF -       Laparoscopy     SURGICAL HISTORY OF -       Hernia Repair      SURGICAL HISTORY OF -       Back Fusion T3 - L3     Family History   Problem Relation Age of Onset     Lipids Mother      Thyroid Disease Mother      Eye Disorder Mother         macular degeneration     Colon Cancer Mother      Anesthesia Reaction Mother         allergic to some     Hypertension Father      Cardiovascular Father         MI     Osteoporosis Father      Alzheimer Disease Father      Genitourinary Problems Father         incontinence     Breast Cancer Maternal Grandmother      Cerebrovascular Disease Maternal Grandfather      Diabetes Paternal Grandmother      Gastrointestinal Disease Sister         colitis     Respiratory Daughter         asthma       Objective  /78 (BP Location: Left arm, Patient Position: Chair, Cuff Size: Adult Regular)   Ht 1.778 m (5' 10\")   Wt 88.5 kg (195 lb)   LMP 2012   BMI 27.98 kg/m       GENERAL APPEARANCE: healthy, alert and no distress   GAIT: antalgic  SKIN: no suspicious lesions or rashes  HEENT: Sclera clear, anicteric  CV: good peripheral pulses  RESP: Breathing not labored  NEURO: Normal " strength and tone, mentation intact and speech normal  PSYCH:  mentation appears normal and affect normal/bright    Bilateral Knee exam  Inspection:      m ild effusion right     Patella:      Mobility -       hypomobile right       Crepitus noted in the patellofemoral joint right     Tender:      lateral patellar border right       lateral joint line right     Non Tender:      remainder of knee area right     Knee ROM:      Flexion 120 degrees right       Extension  5 degrees right     Strength:      5-/5 with knee extension right     Special Tests:     positive (+) Sarkis right       neg (-) anterior drawer right       neg (-) posterior drawer right       neg (-) varus at 0 deg and 30 deg right       neg (-) valgus at 0 deg and 30 deg right     Gait:      antalgic gait     Neurovascular:      2+ peripheral pulses bilaterally and brisk capillary refill       sensation grossly intact    Radiology  I ordered, visualized and reviewed these images with the patient  MR KNEE RIGHT WITHOUT CONTRAST 2/26/2019 3:38 PM     HISTORY: Lateral and anterior right knee pain with locking since  injury one week ago.      TECHNIQUE: Axial and coronal T2 with fat suppression. Coronal T1.  Sagittal dual echo T2.     COMPARISON: None.     FINDINGS:   Medial Meniscus: Mild intrasubstance myxoid degenerative change in the  mid body and posterior horn. Mild irregularity of the free edge in the  mid body could represent degenerative fraying, although a small tear  in this region is not excluded (image 10 of series 6). No displaced  meniscal fragments or flaps.        Lateral Meniscus: Intrasubstance myxoid degenerative change in the  posterior horn and mid body. The free edge in the mid body is very  ill-defined which may related to complex tearing or marked  degenerative fraying and careful probing in this region is recommended  if arthroscopy is performed (image 11 of series 6 and image 5 of  series 5). The anterior horn is  unremarkable. No displaced meniscal  fragments or flaps.        Anterior Cruciate Ligament: Unremarkable.      Posterior Cruciate Ligament: Unremarkable.      Medial Collateral Ligament: Unremarkable.     Lateral Collateral Ligament Complex, Popliteus Tendon: The iliotibial  band, fibular collateral ligament, biceps femoris tendon, and  popliteus tendon are unremarkable.     Osseous and Cartilaginous Structures: No acute-appearing bony  abnormality. Articular cartilages in the weightbearing medial  compartment are normal. There is a 0.6 x 1.0 cm focus of grade III to  IV chondromalacia in the far posterior weightbearing lateral femoral  condyle (image 7 of series 7 and image 7 of series 4). Remainder of  the lateral compartment articular cartilages appear normal. There is  grade III to IV chondromalacia at the superior aspect of the median  eminence of the patella with grade II chondromalacia more inferiorly,  especially with respect to the medial patellar facet. There is a tiny  partial-thickness articular cartilage fissure in the lateral patellar  facet adjacent to the median eminence (image 8 of series 3). Trochlear  groove articular cartilages appear normal. Incidental note of a bony  protuberance extending from the patella inferiorly into the patellar  tendon which may be the result of an accessory ossification center or  potentially an old trauma.     Extensor Mechanism: The quadriceps and patellar tendons are  unremarkable. The medial and lateral patellar retinacula are normal.     Joint Space: Small joint effusion. No definite loose bodies  appreciated.     Additional Findings: No popliteal cyst. No semimembranosus-tibial  collateral ligament or pes anserine bursitis.                                                                      IMPRESSION:   1. Irregularity of the free edge in the mid body of both the medial  and lateral menisci. This may represent degenerative fraying alone,  but tearing of the free  edge is not excluded and careful probing of  these regions is recommended if arthroscopy is performed.  2. Foci of chondromalacia in the lateral and patellofemoral  compartments as described above.  3. Small joint space effusion.     Assessment:  1. Acute pain of right knee    2. Chondromalacia of right patella    3. Degenerative tear of lateral meniscus of right knee    4. Degenerative tear of medial meniscus of right knee      Mechanical symptoms more likely related to chondromalacia, less likely meniscal tears given MRI.  We discussed continued supportive care including rest, ice, elevation, HEP and formal PT.  Gradual return to activities once pain and swelling improves.  Would consider injection or referral pending clinical course.    Plan:  - Today's Plan of Care:  Continue with Home Exercise Program  [phone]  Continue with relative rest and activity modification, Ice, Compression, and Elevation.  Can apply ice 10-15 minutes 3-4 times per day as needed.    -We also discussed other future treatment options:  Referral to orthopedic surgeon or Steroid injection of knee    Follow Up: 6 - 8 weeks as needed    Concerning signs and symptoms were reviewed.  The patient expressed understanding of this management plan and all questions were answered at this time.    Lorie Mata MD CAQ  Primary Care Sports Medicine  [facility]    Again, thank you for allowing me to participate in the care of your patient.        Sincerely,        Lorie Mata MD

## 2019-03-05 NOTE — PROGRESS NOTES
Sports Medicine Clinic Visit - Interim History March 5, 2019    PCP: Radha Felton    Stephanie Trejo is a 58 year old female who is seen in f/u up for Acute pain of right knee. Since last visit on 2/26/19 patient has completed a MRI of her right knee. She reports the knee swelling has improved due to ice and HEP. She reports pain continues in the lateral and medial knee. She reports difficulty with knee flexion and stairs.    Initial Injury History 2/26/2019: She reports right knee pain for 1 weeks. She feels she injured her knee while playing pickle ball but is unsure how it was injured. She reports that she had locking in her knee and pain in the lateral knee. The pain can radiate to her lateral shin. She has been using ice and a knee sleeve. Her pain increases with knee flexion, feels like it's going to lock.    Symptoms are better with: Ice, Ibuprofen and Rest  Symptoms are worse with: flexion and stairs  Additional Features:   Positive: swelling, catching, locking and weakness   Negative: bruising, popping, grinding, instability, paresthesias and numbness    Social History: retired    Review of Systems  Skin: bruising, mild swelling  Musculoskeletal: as above  Neurologic: no numbness, paresthesias  Remainder of review of systems is negative including constitutional, CV, pulmonary, GI, except as noted in HPI or medical history.    Patient's current problem list, past medical and surgical history, and family history were reviewed.    Patient Active Problem List   Diagnosis     Hypothyroidism     Other and unspecified disc disorder of lumbar region     Essential hypertension     CAMELIA (obstructive sleep apnea)     CARDIOVASCULAR SCREENING; LDL GOAL LESS THAN 100     Mitral valve regurgitation     Anaplasmosis     Atrial fibrillation (H)     Abnormal uterine bleeding     Mild major depression (H)     CHF (congestive heart failure) (H)     Counseling regarding advanced directives     Anxiety     Past Medical  "History:   Diagnosis Date     CHF (congestive heart failure) (H) 2013     Depressive disorder      Hypertension      Mitral valve insufficiency, acquired      Other and unspecified disc disorder of lumbar region      Scoliosis (and kyphoscoliosis), idiopathic      Undiagnosed cardiac murmurs      Unspecified hypothyroidism      Past Surgical History:   Procedure Laterality Date     BACK SURGERY       COLONOSCOPY       GYN SURGERY       SOFT TISSUE SURGERY       SURGICAL HISTORY OF -            SURGICAL HISTORY OF -       Laparoscopy     SURGICAL HISTORY OF -       Hernia Repair      SURGICAL HISTORY OF -       Back Fusion T3 - L3     Family History   Problem Relation Age of Onset     Lipids Mother      Thyroid Disease Mother      Eye Disorder Mother         macular degeneration     Colon Cancer Mother      Anesthesia Reaction Mother         allergic to some     Hypertension Father      Cardiovascular Father         MI     Osteoporosis Father      Alzheimer Disease Father      Genitourinary Problems Father         incontinence     Breast Cancer Maternal Grandmother      Cerebrovascular Disease Maternal Grandfather      Diabetes Paternal Grandmother      Gastrointestinal Disease Sister         colitis     Respiratory Daughter         asthma       Objective  /78 (BP Location: Left arm, Patient Position: Chair, Cuff Size: Adult Regular)   Ht 1.778 m (5' 10\")   Wt 88.5 kg (195 lb)   LMP 2012   BMI 27.98 kg/m      GENERAL APPEARANCE: healthy, alert and no distress   GAIT: antalgic  SKIN: no suspicious lesions or rashes  HEENT: Sclera clear, anicteric  CV: good peripheral pulses  RESP: Breathing not labored  NEURO: Normal strength and tone, mentation intact and speech normal  PSYCH:  mentation appears normal and affect normal/bright    Bilateral Knee exam  Inspection:      mild effusion right     Patella:      Mobility -       hypomobile right       Crepitus noted in the " patellofemoral joint right     Tender:      lateral patellar border right       lateral joint line right     Non Tender:      remainder of knee area right     Knee ROM:      Flexion 120 degrees right       Extension 5 degrees right     Strength:      5-/5 with knee extension right     Special Tests:     positive (+) Sarkis right       neg (-) anterior drawer right       neg (-) posterior drawer right       neg (-) varus at 0 deg and 30 deg right       neg (-) valgus at 0 deg and 30 deg right     Gait:      antalgic gait     Neurovascular:      2+ peripheral pulses bilaterally and brisk capillary refill       sensation grossly intact    Radiology  I ordered, visualized and reviewed these images with the patient  MR KNEE RIGHT WITHOUT CONTRAST 2/26/2019 3:38 PM     HISTORY: Lateral and anterior right knee pain with locking since  injury one week ago.      TECHNIQUE: Axial and coronal T2 with fat suppression. Coronal T1.  Sagittal dual echo T2.     COMPARISON: None.     FINDINGS:   Medial Meniscus: Mild intrasubstance myxoid degenerative change in the  mid body and posterior horn. Mild irregularity of the free edge in the  mid body could represent degenerative fraying, although a small tear  in this region is not excluded (image 10 of series 6). No displaced  meniscal fragments or flaps.        Lateral Meniscus: Intrasubstance myxoid degenerative change in the  posterior horn and mid body. The free edge in the mid body is very  ill-defined which may related to complex tearing or marked  degenerative fraying and careful probing in this region is recommended  if arthroscopy is performed (image 11 of series 6 and image 5 of  series 5). The anterior horn is unremarkable. No displaced meniscal  fragments or flaps.        Anterior Cruciate Ligament: Unremarkable.      Posterior Cruciate Ligament: Unremarkable.      Medial Collateral Ligament: Unremarkable.     Lateral Collateral Ligament Complex, Popliteus Tendon: The  iliotibial  band, fibular collateral ligament, biceps femoris tendon, and  popliteus tendon are unremarkable.     Osseous and Cartilaginous Structures: No acute-appearing bony  abnormality. Articular cartilages in the weightbearing medial  compartment are normal. There is a 0.6 x 1.0 cm focus of grade III to  IV chondromalacia in the far posterior weightbearing lateral femoral  condyle (image 7 of series 7 and image 7 of series 4). Remainder of  the lateral compartment articular cartilages appear normal. There is  grade III to IV chondromalacia at the superior aspect of the median  eminence of the patella with grade II chondromalacia more inferiorly,  especially with respect to the medial patellar facet. There is a tiny  partial-thickness articular cartilage fissure in the lateral patellar  facet adjacent to the median eminence (image 8 of series 3). Trochlear  groove articular cartilages appear normal. Incidental note of a bony  protuberance extending from the patella inferiorly into the patellar  tendon which may be the result of an accessory ossification center or  potentially an old trauma.     Extensor Mechanism: The quadriceps and patellar tendons are  unremarkable. The medial and lateral patellar retinacula are normal.     Joint Space: Small joint effusion. No definite loose bodies  appreciated.     Additional Findings: No popliteal cyst. No semimembranosus-tibial  collateral ligament or pes anserine bursitis.                                                                      IMPRESSION:   1. Irregularity of the free edge in the mid body of both the medial  and lateral menisci. This may represent degenerative fraying alone,  but tearing of the free edge is not excluded and careful probing of  these regions is recommended if arthroscopy is performed.  2. Foci of chondromalacia in the lateral and patellofemoral  compartments as described above.  3. Small joint space effusion.     Assessment:  1. Acute pain of  right knee    2. Chondromalacia of right patella    3. Degenerative tear of lateral meniscus of right knee    4. Degenerative tear of medial meniscus of right knee      Mechanical symptoms more likely related to chondromalacia, less likely meniscal tears given MRI.  We discussed continued supportive care including rest, ice, elevation, HEP and formal PT.  Gradual return to activities once pain and swelling improves.  Would consider injection or referral pending clinical course.    Plan:  - Today's Plan of Care:  Continue with Home Exercise Program  Physical Therapy: [facility] - [phone]  Continue with relative rest and activity modification, Ice, Compression, and Elevation.  Can apply ice 10-15 minutes 3-4 times per day as needed.    -We also discussed other future treatment options:  Referral to orthopedic surgeon or Steroid injection of knee    Follow Up: 6 - 8 weeks as needed    Concerning signs and symptoms were reviewed.  The patient expressed understanding of this management plan and all questions were answered at this time.    Lorie Mata MD CAQ  Primary Care Sports Medicine  [facility]

## 2019-03-05 NOTE — PATIENT INSTRUCTIONS
Plan:  - Today's Plan of Care:  Continue with Home Exercise Program  [facility] - [phone]  Continue with relative rest and activity modification, Ice, Compression, and Elevation.  Can apply ice 10-15 minutes 3-4 times per day as needed.    -We also discussed other future treatment options:  Referral to orthopedic surgeon or Steroid injection of knee    Follow Up: 6 - 8 weeks as needed    If you have any further questions for your physician or physician s care team you can call [phone] and use option 3 to leave a voice message. Calls received during business hours will be returned same day.

## 2019-03-11 ENCOUNTER — HOSPITAL ENCOUNTER (OUTPATIENT)
Dept: PHYSICAL THERAPY | Facility: CLINIC | Age: 59
Setting detail: THERAPIES SERIES
End: 2019-03-11
Attending: PEDIATRICS
Payer: COMMERCIAL

## 2019-03-11 DIAGNOSIS — M23.203 DEGENERATIVE TEAR OF MEDIAL MENISCUS OF RIGHT KNEE: ICD-10-CM

## 2019-03-11 DIAGNOSIS — M23.300 DEGENERATIVE TEAR OF LATERAL MENISCUS OF RIGHT KNEE: ICD-10-CM

## 2019-03-11 DIAGNOSIS — M25.561 ACUTE PAIN OF RIGHT KNEE: ICD-10-CM

## 2019-03-11 DIAGNOSIS — M22.41 CHONDROMALACIA OF RIGHT PATELLA: ICD-10-CM

## 2019-03-11 PROCEDURE — 97110 THERAPEUTIC EXERCISES: CPT | Mod: GP | Performed by: PHYSICAL THERAPIST

## 2019-03-11 PROCEDURE — 97161 PT EVAL LOW COMPLEX 20 MIN: CPT | Mod: GP | Performed by: PHYSICAL THERAPIST

## 2019-03-12 NOTE — PROGRESS NOTES
03/11/19 1500   General Information   Type of Visit Initial OP Ortho PT Evaluation   Start of Care Date 03/11/19   Referring Physician Lorie Mata MD   Patient/Family Goals Statement To return to pickelball   Orders Evaluate and Treat   Date of Order 03/05/19   Insurance Type [employer]   Insurance Comments/Visits Authorized [employer] [place]  (>40 pre auth)   Medical Diagnosis Acute pain of right knee; choncromalacia of right patella; degenerative tear of lateral meniscus of right knee; degenerative tear of medial meniscus of right knee   Surgical/Medical history reviewed Yes   Precautions/Limitations no known precautions/limitations   General Information Comments History of scoliosis with back fusion C7-L4  performed 1999   Body Part(s)   Body Part(s) Knee   Presentation and Etiology   Pertinent history of current problem (include personal factors and/or comorbidities that impact the POC) Pt reports: after 2 hrs of pickle ball her right lateral knee started to feel stiff and locked up.  No previous hx of knee pain..  Twisting, bending, and straightening worsents the ain.   Impairments A. Pain;C. Swelling;E. Decreased flexibility;F. Decreased strength and endurance;M. Locking or catching   Functional Limitations perform activities of daily living;perform required work activities;perform desired leisure / sports activities   Symptom Location Right lateral and posterior knee, at times medial   How/Where did it occur From Degenerative Joint Disease;During recreation/sports   Onset date of current episode/exacerbation 02/26/19   Chronicity New   Pain rating (0-10 point scale) Best (/10);Worst (/10)   Best (/10) 1   Worst (/10) 5   Pain quality C. Aching   Frequency of pain/symptoms D. Other   Pain frequency comment Certain movements   Pain/symptoms are: The same all the time   Pain/symptoms exacerbated by G. Certain positions;I. Bending   Pain/symptoms eased by E. Changing positions;H. Cold;I. OTC medication(s);J.  Braces/supports   Progression of symptoms since onset: Improved   Current / Previous Interventions   Diagnostic Tests: MRI   MRI Results Results   MRI results 2/26/19 MR right knee: IMPRESSION:  1. Irregularity of the free edge in the mid body of both the medial and lateral menisci. This may represent degenerative fraying alone, but tearing of the free edge is not excluded and careful probing of these regions is recommended if arthroscopy is performed. 2. Foci of chondromalacia in the lateral and patellofemoral compartments as described above 3. Small joint space effusion.   Prior Level of Function   Prior Level of Function-Mobility Independent   Prior Level of Function-ADLs Independent   Current Level of Function   Current Community Support Family/friend caregiver   Patient role/employment history F. Retired   Living environment [facility]   Fall Risk Screen   Fall screen completed by PT   Have you fallen 2 or more times in the past year? No   Have you fallen and had an injury in the past year? No   Is patient a fall risk? No   Knee Objective Findings   Side (if bilateral, select both right and left) Right   Palpation Joint line tenderness lateral compartment   Accessory Motion/Joint Mobility Limited right patellar mobility <1 quadrant all directions   Right Knee Extension AROM Lacking 20 deg; left=0 deg   Right Knee Flexion AROM 121 (left=145 deg)   Right Knee Flexion Strength 4/5   Right Knee Extension Strength 4/5   Right Hip Abduction Strength 4/5   Right Quad Set Strength fair   Right Gastrocnemius Flexibility 0 degrees dorsiflexion   Planned Therapy Interventions   Planned Therapy Interventions ADL retraining;balance training;gait training;joint mobilization;manual therapy;motor coordination training;neuromuscular re-education;ROM   Planned Modality Interventions   Planned Modality Interventions Cryotherapy   Clinical Impression   Criteria for Skilled Therapeutic Interventions Met yes, treatment indicated    PT Diagnosis Right knee meniscus pain and impaired patellar mobility   Influenced by the following impairments Pain; weakness; impaired ROM; impaired gait   Functional limitations due to impairments Pain and difficulty with ambulation, ADL's   Clinical Presentation Stable/Uncomplicated   Clinical Presentation Rationale (+) motivation; overall health; age     Clinical Decision Making (Complexity) Low complexity   Therapy Frequency 1 time/week   Predicted Duration of Therapy Intervention (days/wks) 8 weeks   Risk & Benefits of therapy have been explained Yes   Patient, Family & other staff in agreement with plan of care Yes   Clinical Impression Comments Stephanie arrives today ~2 weeks s/p cut / twist injury to right knee presenting as meniscal in nature; she will benefit from skilled PT to address the above impairments and functional limitations.   Education Assessment   Preferred Learning Style Listening;Pictures/video;Demonstration   Barriers to Learning No barriers   ORTHO GOALS   PT Ortho Eval Goals 1;2;3   Ortho Goal 1   Goal Description Patient will have 0-130 degrees of right knee ROM to allow for normalized ADL's   Target Date 05/07/19   Ortho Goal 2   Goal Description Patient will be able to climb stairs reciprocal step pattern without knee pain.   Target Date 05/07/19   Ortho Goal 3   Goal Description Patient will be able to play pickle ball without knee pain.   Target Date 05/07/19   Total Evaluation Time   PT Eval, Low Complexity Minutes (05790) 20       Amina Recio, PT, DPT  Doctor of Physical Therapy #[id number]  [facility]  [phone]  [email or web address]

## 2019-03-18 ENCOUNTER — HOSPITAL ENCOUNTER (OUTPATIENT)
Dept: PHYSICAL THERAPY | Facility: CLINIC | Age: 59
Setting detail: THERAPIES SERIES
End: 2019-03-18
Attending: PEDIATRICS
Payer: COMMERCIAL

## 2019-03-18 PROCEDURE — 97140 MANUAL THERAPY 1/> REGIONS: CPT | Mod: GP | Performed by: PHYSICAL THERAPIST

## 2019-03-18 PROCEDURE — 97110 THERAPEUTIC EXERCISES: CPT | Mod: GP | Performed by: PHYSICAL THERAPIST

## 2019-03-26 ENCOUNTER — HOSPITAL ENCOUNTER (OUTPATIENT)
Dept: PHYSICAL THERAPY | Facility: CLINIC | Age: 59
Setting detail: THERAPIES SERIES
End: 2019-03-26
Attending: PEDIATRICS
Payer: COMMERCIAL

## 2019-03-26 PROCEDURE — 97140 MANUAL THERAPY 1/> REGIONS: CPT | Mod: GP | Performed by: PHYSICAL THERAPIST

## 2019-03-26 PROCEDURE — 97110 THERAPEUTIC EXERCISES: CPT | Mod: GP | Performed by: PHYSICAL THERAPIST

## 2019-04-02 ENCOUNTER — HOSPITAL ENCOUNTER (OUTPATIENT)
Dept: PHYSICAL THERAPY | Facility: CLINIC | Age: 59
Setting detail: THERAPIES SERIES
End: 2019-04-02
Attending: PEDIATRICS
Payer: COMMERCIAL

## 2019-04-02 PROCEDURE — 97110 THERAPEUTIC EXERCISES: CPT | Mod: GP | Performed by: PHYSICAL THERAPIST

## 2019-04-15 ENCOUNTER — HOSPITAL ENCOUNTER (OUTPATIENT)
Dept: PHYSICAL THERAPY | Facility: CLINIC | Age: 59
Setting detail: THERAPIES SERIES
End: 2019-04-15
Attending: PEDIATRICS
Payer: COMMERCIAL

## 2019-04-15 PROCEDURE — 97140 MANUAL THERAPY 1/> REGIONS: CPT | Mod: GP | Performed by: PHYSICAL THERAPIST

## 2019-04-30 ENCOUNTER — HOSPITAL ENCOUNTER (OUTPATIENT)
Dept: PHYSICAL THERAPY | Facility: CLINIC | Age: 59
Setting detail: THERAPIES SERIES
End: 2019-04-30
Attending: PEDIATRICS
Payer: COMMERCIAL

## 2019-04-30 PROCEDURE — 97140 MANUAL THERAPY 1/> REGIONS: CPT | Mod: GP | Performed by: PHYSICAL THERAPIST

## 2019-04-30 PROCEDURE — 97110 THERAPEUTIC EXERCISES: CPT | Mod: GP | Performed by: PHYSICAL THERAPIST

## 2019-04-30 NOTE — PROGRESS NOTES
Outpatient Physical Therapy Discharge Note     Patient: Stephanie Trejo  : 1960    Beginning/End Dates of Reporting Period:  3/11/19 to 2019    Referring Provider: Lorie Mata MD    Therapy Diagnosis: Right      Client Self Report: Knee continues to be pain free.  Pickleball feels good, even after an hr of playing.  HEP going well.    Objective Measurements:  Objective Measure: Right knee ROM  Details: 0-130 deg  Objective Measure: Squat  Details: Pain free to 50 deg         Goals:  Goal Identifier     Goal Description Patient will have 0-130 degrees of right knee ROM to allow for normalized ADL's   Target Date 19   Date Met  19   Progress:     Goal Identifier     Goal Description Patient will be able to climb stairs reciprocal step pattern without knee pain.   Target Date 19   Date Met  19   Progress:     Goal Identifier     Goal Description Patient will be able to play pickle ball without knee pain.   Target Date 19   Date Met  19   Progress:     Progress Toward Goals:   Progress this reporting period: Stephanie has made excellent progress with pain reduction, knee range of motion, and return to pickle ball.  She is independent with her long term HEP and all goals have been met.      Plan:  Discharge from therapy.    Discharge:    Reason for Discharge: Patient has met all goals.    Equipment Issued: Theraband    Discharge Plan: Patient to continue home program.      Amina Recio, PT, DPT  Doctor of Physical Therapy #[id number]  [facility]  [phone]  [email or web address]

## 2019-05-22 ENCOUNTER — OFFICE VISIT (OUTPATIENT)
Dept: FAMILY MEDICINE | Facility: CLINIC | Age: 59
End: 2019-05-22
Payer: COMMERCIAL

## 2019-05-22 VITALS
BODY MASS INDEX: 28.37 KG/M2 | DIASTOLIC BLOOD PRESSURE: 82 MMHG | HEART RATE: 77 BPM | WEIGHT: 198.2 LBS | RESPIRATION RATE: 16 BRPM | SYSTOLIC BLOOD PRESSURE: 138 MMHG | OXYGEN SATURATION: 96 % | TEMPERATURE: 97.5 F | HEIGHT: 70 IN

## 2019-05-22 DIAGNOSIS — L82.0 INFLAMED SEBORRHEIC KERATOSIS: Primary | ICD-10-CM

## 2019-05-22 PROCEDURE — 17110 DESTRUCTION B9 LES UP TO 14: CPT | Performed by: FAMILY MEDICINE

## 2019-05-22 ASSESSMENT — MIFFLIN-ST. JEOR: SCORE: 1549.28

## 2019-05-22 NOTE — NURSING NOTE
"Chief Complaint   Patient presents with     Lesion     on head       Initial /82 (BP Location: Right arm, Patient Position: Chair, Cuff Size: Adult Large)   Pulse 77   Temp 97.5  F (36.4  C) (Tympanic)   Resp 16   Ht 1.778 m (5' 10\")   Wt 89.9 kg (198 lb 3.2 oz)   LMP 09/21/2012   SpO2 96%   BMI 28.44 kg/m   Estimated body mass index is 28.44 kg/m  as calculated from the following:    Height as of this encounter: 1.778 m (5' 10\").    Weight as of this encounter: 89.9 kg (198 lb 3.2 oz).    Patient presents to the clinic using No DME    Health Maintenance that is potentially due pending provider review:  Mammogram , Pap Smear - notified and Colonoscopy/FIT - pt notified     Ananya Gonzalez MA  3:53 PM 5/22/2019  .        "

## 2019-06-11 DIAGNOSIS — F51.04 PSYCHOPHYSIOLOGICAL INSOMNIA: ICD-10-CM

## 2019-06-11 DIAGNOSIS — F41.9 ANXIETY: ICD-10-CM

## 2019-06-12 RX ORDER — TEMAZEPAM 15 MG/1
CAPSULE ORAL
Qty: 60 CAPSULE | Refills: 5 | Status: SHIPPED | OUTPATIENT
Start: 2019-06-12 | End: 2020-01-13

## 2019-06-12 NOTE — TELEPHONE ENCOUNTER
temazepam (RESTORIL) 15 MG capsule      Last Written Prescription Date:  12/4/18  Last Fill Quantity: 60,   # refills: 5  Last Office Visit: 2/5/19  Future Office visit:       Routing refill request to provider for review/approval because:  Drug not on the [facility], [facility] or [facility] refill protocol or controlled substance

## 2019-09-11 ENCOUNTER — MYC MEDICAL ADVICE (OUTPATIENT)
Dept: FAMILY MEDICINE | Facility: CLINIC | Age: 59
End: 2019-09-11

## 2019-09-13 NOTE — PROGRESS NOTES
"Subjective     Stephanie Trejo is a 58 year old female who presents to clinic today for the following health issues:    HPI     1.) Lesion on Head  - Has had liquid nitrogen in the past.   - Has come back and now changing. More raised  - some itching.       S: Stephanie Trejo is a 58 year old female with crusted itchy area on hairline.  Had it  Frozen about 10 years ago, has slowly returned.  Brown, crusted.  Round.    Problem list, med list, additional histories reviewed and updated, as indicated.      O:/82 (BP Location: Right arm, Patient Position: Chair, Cuff Size: Adult Large)   Pulse 77   Temp 97.5  F (36.4  C) (Tympanic)   Resp 16   Ht 1.778 m (5' 10\")   Wt 89.9 kg (198 lb 3.2 oz)   LMP 09/21/2012   SpO2 96%   BMI 28.44 kg/m    GEN: Alert and oriented, in no acute distress  Has 3cm by 2cm crusted light brown raised lesion.  C/w seb K hairline.      Given location, irritation, itching, we elected to freeze it.     A: seborrheic keratosis, inflamed.     P: froze the lesion.  F/u prn.      " Results of preop labs conveyed to patient's wife, Angi, who voiced understanding and states she will let patient know.

## 2019-09-17 ENCOUNTER — ALLIED HEALTH/NURSE VISIT (OUTPATIENT)
Dept: FAMILY MEDICINE | Facility: CLINIC | Age: 59
End: 2019-09-17
Payer: COMMERCIAL

## 2019-09-17 DIAGNOSIS — Z23 NEED FOR PROPHYLACTIC VACCINATION AND INOCULATION AGAINST INFLUENZA: ICD-10-CM

## 2019-09-17 DIAGNOSIS — Z23 NEED FOR VACCINATION: Primary | ICD-10-CM

## 2019-09-17 PROCEDURE — 90471 IMMUNIZATION ADMIN: CPT

## 2019-09-17 PROCEDURE — 90746 HEPB VACCINE 3 DOSE ADULT IM: CPT

## 2019-09-17 PROCEDURE — 90632 HEPA VACCINE ADULT IM: CPT

## 2019-09-17 PROCEDURE — 90682 RIV4 VACC RECOMBINANT DNA IM: CPT

## 2019-09-17 PROCEDURE — 90472 IMMUNIZATION ADMIN EACH ADD: CPT

## 2019-09-17 PROCEDURE — 90707 MMR VACCINE SC: CPT

## 2019-09-17 PROCEDURE — 99207 ZZC NO CHARGE NURSE ONLY: CPT

## 2019-09-17 NOTE — NURSING NOTE
Prior to immunization administration, verified patients identity using patient s name and date of birth. Please see Immunization Activity for additional information.     Screening Questionnaire for Adult Immunization    Are you sick today?   No   Do you have allergies to medications, food, a vaccine component or latex?   No   Have you ever had a serious reaction after receiving a vaccination?   No   Do you have a long-term health problem with heart disease, lung disease, asthma, kidney disease, metabolic disease (e.g. diabetes), anemia, or other blood disorder?   No   Do you have cancer, leukemia, HIV/AIDS, or any other immune system problem?   No   In the past 3 months, have you taken medications that affect  your immune system, such as prednisone, other steroids, or anticancer drugs; drugs for the treatment of rheumatoid arthritis, Crohn s disease, or psoriasis; or have you had radiation treatments?   No   Have you had a seizure, or a brain or other nervous system problem?   No   During the past year, have you received a transfusion of blood or blood     products, or been given immune (gamma) globulin or antiviral drug?   No   For women: Are you pregnant or is there a chance you could become        pregnant during the next month?   No   Have you received any vaccinations in the past 4 weeks?   No     Immunization questionnaire answers were all negative.        Per orders of , injection of MMR, FLU BLOCK, HEP B AND HEP A given by Aury Eller CMA. Patient instructed to remain in clinic for 15 minutes afterwards, and to report any adverse reaction to me immediately.       Screening performed by Aury Eller CMA on 9/17/2019 at 11:20 AM.

## 2019-10-02 ENCOUNTER — E-VISIT (OUTPATIENT)
Dept: FAMILY MEDICINE | Facility: CLINIC | Age: 59
End: 2019-10-02
Payer: COMMERCIAL

## 2019-10-02 ENCOUNTER — E-VISIT (OUTPATIENT)
Dept: FAMILY MEDICINE | Facility: CLINIC | Age: 59
End: 2019-10-02

## 2019-10-02 DIAGNOSIS — N30.00 ACUTE CYSTITIS WITHOUT HEMATURIA: Primary | ICD-10-CM

## 2019-10-02 DIAGNOSIS — F32.0 MILD MAJOR DEPRESSION (H): Primary | ICD-10-CM

## 2019-10-02 PROCEDURE — 99444 ZZC PHYSICIAN ONLINE EVALUATION & MANAGEMENT SERVICE: CPT | Performed by: FAMILY MEDICINE

## 2019-10-02 RX ORDER — BUPROPION HYDROCHLORIDE 150 MG/1
150 TABLET ORAL DAILY
Qty: 30 TABLET | Refills: 1 | Status: SHIPPED | OUTPATIENT
Start: 2019-10-02 | End: 2019-10-27

## 2019-10-02 RX ORDER — SULFAMETHOXAZOLE/TRIMETHOPRIM 800-160 MG
1 TABLET ORAL 2 TIMES DAILY
Qty: 14 TABLET | Refills: 0 | Status: SHIPPED | OUTPATIENT
Start: 2019-10-02 | End: 2020-01-13

## 2019-10-02 ASSESSMENT — PATIENT HEALTH QUESTIONNAIRE - PHQ9
10. IF YOU CHECKED OFF ANY PROBLEMS, HOW DIFFICULT HAVE THESE PROBLEMS MADE IT FOR YOU TO DO YOUR WORK, TAKE CARE OF THINGS AT HOME, OR GET ALONG WITH OTHER PEOPLE: NOT DIFFICULT AT ALL
SUM OF ALL RESPONSES TO PHQ QUESTIONS 1-9: 10
SUM OF ALL RESPONSES TO PHQ QUESTIONS 1-9: 10

## 2019-10-02 NOTE — PATIENT INSTRUCTIONS
Thank you for choosing us for your care. I have placed an order for a prescription so that you can start treatment. View your full visit summary for details by clicking on the link below. Your pharmacist will able to address any questions you may have about the medication.      If you're not feeling better within 4-6 weeks please schedule an appointment.  You can schedule an appointment right here in [facility], or call [phone]  If the visit is for the same symptoms as your e-visit, we'll refund the cost of your e-visit if seen within seven days.    Stephanie WALL sent Rx fpor Wellbutrin to the pharmacy for you. You have been on this before. Let me know in one week how you are doing. Also will want to check BP after starting this medication as well  Let me know if questions  Radha Felton MD

## 2019-10-02 NOTE — PATIENT INSTRUCTIONS
Thank you for choosing us for your care. I have placed an order for a prescription so that you can start treatment. View your full visit summary for details by clicking on the link below. Your pharmacist will able to address any questions you may have about the medication.     If you re not feeling better within 2-3 days, please schedule an appointment.  You can schedule an appointment right here in [facility], or call [phone]  If the visit is for the same symptoms as your e-visit, we ll refund the cost of your e-visit if seen within seven days.      Urinary Tract Infections in Women    Urinary tract infections (UTIs) are most often caused by bacteria. These bacteria enter the urinary tract. The bacteria may come from outside the body. Or they may travel from the skin outside the rectum or vagina into the urethra. Female anatomy makes it easy for bacteria from the bowel to enter a woman s urinary tract, which is the most common source of UTI. This means women develop UTIs more often than men. Pain in or around the urinary tract is a common UTI symptom. But the only way to know for sure if you have a UTI for the healthcare provider to test your urine. The two tests that may be done are the urinalysis and urine culture.  Types of UTIs    Cystitis. A bladder infection (cystitis) is the most common UTI in women. You may have urgent or frequent urination. You may also have pain, burning when you urinate, and bloody urine.    Urethritis. This is an inflamed urethra, which is the tube that carries urine from the bladder to outside the body. You may have lower stomach or back pain. You may also have urgent or frequent urination.    Pyelonephritis. This is a kidney infection. If not treated, it can be serious and damage your kidneys. In severe cases, you may need to stay in the hospital. You may have a fever and lower back pain.  Medicines to treat a UTI  Most UTIs are treated with antibiotics. These kill the bacteria.  The length of time you need to take them depends on the type of infection. It may be as short as 3 days. If you have repeated UTIs, you may need a low-dose antibiotic for several months. Take antibiotics exactly as directed. Don t stop taking them until all of the medicine is gone. If you stop taking the antibiotic too soon, the infection may not go away. You may also develop a resistance to the antibiotic. This can make it much harder to treat.  Lifestyle changes to treat and prevent UTIs  The lifestyle changes below will help get rid of your UTI. They may also help prevent future UTIs.    Drink plenty of fluids. This includes water, juice, or other caffeine-free drinks. Fluids help flush bacteria out of your body.    Empty your bladder. Always empty your bladder when you feel the urge to urinate. And always urinate before going to sleep. Urine that stays in your bladder can lead to infection. Try to urinate before and after sex as well.    Practice good personal hygiene. Wipe yourself from front to back after using the toilet. This helps keep bacteria from getting into the urethra.    Use condoms during sex. These help prevent UTIs caused by sexually transmitted bacteria. Also don't use spermicides during sex. These can increase the risk for UTIs. Choose other forms of birth control instead. For women who tend to get UTIs after sex, a low-dose of a preventive antibiotic may be used. Be sure to discuss this option with your healthcare provider.    Follow up with your healthcare provider as directed. He or she may test to make sure the infection has cleared. If needed, more treatment may be started.  Date Last Reviewed: 1/1/2017 2000-2018 [employer]. [address] [place] [address], [place] [address]. All rights reserved. This information is not intended as a substitute for professional medical care. Always follow your healthcare professional's instructions.

## 2019-10-03 ASSESSMENT — PATIENT HEALTH QUESTIONNAIRE - PHQ9: SUM OF ALL RESPONSES TO PHQ QUESTIONS 1-9: 10

## 2019-10-21 ENCOUNTER — MYC MEDICAL ADVICE (OUTPATIENT)
Dept: FAMILY MEDICINE | Facility: CLINIC | Age: 59
End: 2019-10-21

## 2019-10-22 ENCOUNTER — ALLIED HEALTH/NURSE VISIT (OUTPATIENT)
Dept: FAMILY MEDICINE | Facility: CLINIC | Age: 59
End: 2019-10-22
Payer: COMMERCIAL

## 2019-10-22 DIAGNOSIS — Z23 NEED FOR PROPHYLACTIC VACCINATION AND INOCULATION AGAINST VIRAL HEPATITIS: Primary | ICD-10-CM

## 2019-10-22 PROCEDURE — 99207 ZZC NO CHARGE NURSE ONLY: CPT

## 2019-10-22 PROCEDURE — 90746 HEPB VACCINE 3 DOSE ADULT IM: CPT

## 2019-10-22 PROCEDURE — 90471 IMMUNIZATION ADMIN: CPT

## 2019-10-22 NOTE — NURSING NOTE
Prior to immunization administration, verified patients identity using patient s name and date of birth. Please see Immunization Activity for additional information.     Screening Questionnaire for Adult Immunization    Are you sick today?   No   Do you have allergies to medications, food, a vaccine component or latex?   Yes   Have you ever had a serious reaction after receiving a vaccination?   No   Do you have a long-term health problem with heart disease, lung disease, asthma, kidney disease, metabolic disease (e.g. diabetes), anemia, or other blood disorder?   Yes   Do you have cancer, leukemia, HIV/AIDS, or any other immune system problem?   No   In the past 3 months, have you taken medications that affect  your immune system, such as prednisone, other steroids, or anticancer drugs; drugs for the treatment of rheumatoid arthritis, Crohn s disease, or psoriasis; or have you had radiation treatments?   No   Have you had a seizure, or a brain or other nervous system problem?   No   During the past year, have you received a transfusion of blood or blood     products, or been given immune (gamma) globulin or antiviral drug?   No   For women: Are you pregnant or is there a chance you could become        pregnant during the next month?   No   Have you received any vaccinations in the past 4 weeks?   No     Immunization questionnaire was positive for at least one answer.  Ok per guidelines for Hep B #2.      . Patient instructed to remain in clinic for 15 minutes afterwards, and to report any adverse reaction to me immediately.       Screening performed by Oren Chandler CMA on 10/22/2019 at 1:09 PM.

## 2019-10-27 DIAGNOSIS — F32.0 MILD MAJOR DEPRESSION (H): ICD-10-CM

## 2019-10-27 NOTE — TELEPHONE ENCOUNTER
"Patient requests 90 day supply    Requested Prescriptions   Pending Prescriptions Disp Refills     buPROPion (WELLBUTRIN XL) 150 MG 24 hr tablet [Pharmacy Med Name: BUPROPION XL 150MG TABLETS (24 H)]  Last Written Prescription Date:  10/02/19  Last Fill Quantity: 90,  # refills: 1   Last office visit: 05/22/2019 with prescribing provider:  THELMA Sandhu   Future Office Visit:     90 tablet 1     Sig: TAKE 1 TABLET(150 MG) BY MOUTH DAILY       SSRIs Protocol Failed - 10/27/2019  7:43 AM   SHERON-7 SCORE 11/27/2017 5/17/2018 12/4/2018   Total Score - - -   Total Score - 2 (minimal anxiety) 1 (minimal anxiety)   Total Score 3 2 1       PHQ-9 SCORE 5/17/2018 12/4/2018 10/2/2019   PHQ-9 Total Score - - -   PHQ-9 Total Score MyChart 2 (Minimal depression) 0 10 (Moderate depression)   PHQ-9 Total Score 2 0 10        Failed - PHQ-9 score less than 5 in past 6 months     Please review last PHQ-9 score.           Passed - Medication is Bupropion     If the medication is Bupropion (Wellbutrin), and the patient is taking for smoking cessation; OK to refill.          Passed - Medication is active on med list        Passed - Patient is age 18 or older        Passed - No active pregnancy on record        Passed - No positive pregnancy test in last 12 months        Passed - Recent (6 mo) or future (30 days) visit within the authorizing provider's specialty     Patient had office visit in the last 6 months or has a visit in the next 30 days with authorizing provider or within the authorizing provider's specialty.  See \"Patient Info\" tab in inbasket, or \"Choose Columns\" in Meds & Orders section of the refill encounter.              "

## 2019-10-28 RX ORDER — BUPROPION HYDROCHLORIDE 150 MG/1
TABLET ORAL
Qty: 90 TABLET | Refills: 1 | Status: SHIPPED | OUTPATIENT
Start: 2019-10-28 | End: 2020-04-22

## 2019-10-30 ENCOUNTER — HEALTH MAINTENANCE LETTER (OUTPATIENT)
Age: 59
End: 2019-10-30

## 2019-12-02 ENCOUNTER — MYC MEDICAL ADVICE (OUTPATIENT)
Dept: FAMILY MEDICINE | Facility: CLINIC | Age: 59
End: 2019-12-02

## 2019-12-02 DIAGNOSIS — E03.9 HYPOTHYROIDISM, UNSPECIFIED TYPE: ICD-10-CM

## 2019-12-02 DIAGNOSIS — Z91.89 AT RISK FOR INFECTIOUS DISEASE DUE TO RECENT FOREIGN TRAVEL: Primary | ICD-10-CM

## 2019-12-02 RX ORDER — LEVOTHYROXINE SODIUM 125 UG/1
TABLET ORAL
Qty: 90 TABLET | Refills: 0 | Status: SHIPPED | OUTPATIENT
Start: 2019-12-02 | End: 2020-01-13

## 2019-12-02 RX ORDER — MEFLOQUINE HYDROCHLORIDE 250 MG/1
250 TABLET ORAL
Qty: 12 TABLET | Refills: 0 | Status: SHIPPED | OUTPATIENT
Start: 2019-12-02 | End: 2020-01-13

## 2019-12-02 NOTE — TELEPHONE ENCOUNTER
"Requested Prescriptions   Pending Prescriptions Disp Refills     levothyroxine (SYNTHROID/LEVOTHROID) 125 MCG tablet [Pharmacy Med Name: LEVOTHYROXINE 0.125MG (125MCG) TAB] 90 tablet 0     Sig: TAKE 1 TABLET(125 MCG) BY MOUTH DAILY       Thyroid Protocol Failed - 12/2/2019  9:05 AM        Failed - Normal TSH on file in past 12 months     Recent Labs   Lab Test 11/28/18  0904   TSH 0.50              Passed - Patient is 12 years or older        Passed - Recent (12 mo) or future (30 days) visit within the authorizing provider's specialty     Patient has had an office visit with the authorizing provider or a provider within the authorizing providers department within the previous 12 mos or has a future within next 30 days. See \"Patient Info\" tab in inbasket, or \"Choose Columns\" in Meds & Orders section of the refill encounter.              Passed - Medication is active on med list        Passed - No active pregnancy on record     If patient is pregnant or has had a positive pregnancy test, please check TSH.          Passed - No positive pregnancy test in past 12 months     If patient is pregnant or has had a positive pregnancy test, please check TSH.          Last Written Prescription Date:  12/4/19  Last Fill Quantity: 90,  # refills: 3   Last office visit: 10/22/2019 with prescribing provider:  Dr. Felton  Future Office Visit:      "

## 2020-01-11 ASSESSMENT — ENCOUNTER SYMPTOMS
EYE PAIN: 0
HEARTBURN: 0
NAUSEA: 0
DIZZINESS: 0
HEMATOCHEZIA: 0
DIARRHEA: 0
ABDOMINAL PAIN: 0
SHORTNESS OF BREATH: 0
CONSTIPATION: 0
FEVER: 0
DYSURIA: 0
HEMATURIA: 0
BREAST MASS: 0
MYALGIAS: 0
PARESTHESIAS: 0
NERVOUS/ANXIOUS: 0
ARTHRALGIAS: 0
FREQUENCY: 0
PALPITATIONS: 0
WEAKNESS: 0
SORE THROAT: 0
JOINT SWELLING: 0
COUGH: 1
HEADACHES: 0
CHILLS: 0

## 2020-01-11 ASSESSMENT — ANXIETY QUESTIONNAIRES
6. BECOMING EASILY ANNOYED OR IRRITABLE: NOT AT ALL
1. FEELING NERVOUS, ANXIOUS, OR ON EDGE: NOT AT ALL
7. FEELING AFRAID AS IF SOMETHING AWFUL MIGHT HAPPEN: NOT AT ALL
5. BEING SO RESTLESS THAT IT IS HARD TO SIT STILL: NOT AT ALL
GAD7 TOTAL SCORE: 0
2. NOT BEING ABLE TO STOP OR CONTROL WORRYING: NOT AT ALL
GAD7 TOTAL SCORE: 0
7. FEELING AFRAID AS IF SOMETHING AWFUL MIGHT HAPPEN: NOT AT ALL
3. WORRYING TOO MUCH ABOUT DIFFERENT THINGS: NOT AT ALL
GAD7 TOTAL SCORE: 0
4. TROUBLE RELAXING: NOT AT ALL

## 2020-01-11 ASSESSMENT — PATIENT HEALTH QUESTIONNAIRE - PHQ9
10. IF YOU CHECKED OFF ANY PROBLEMS, HOW DIFFICULT HAVE THESE PROBLEMS MADE IT FOR YOU TO DO YOUR WORK, TAKE CARE OF THINGS AT HOME, OR GET ALONG WITH OTHER PEOPLE: NOT DIFFICULT AT ALL
SUM OF ALL RESPONSES TO PHQ QUESTIONS 1-9: 0
SUM OF ALL RESPONSES TO PHQ QUESTIONS 1-9: 0

## 2020-01-12 ASSESSMENT — PATIENT HEALTH QUESTIONNAIRE - PHQ9: SUM OF ALL RESPONSES TO PHQ QUESTIONS 1-9: 0

## 2020-01-12 ASSESSMENT — ANXIETY QUESTIONNAIRES: GAD7 TOTAL SCORE: 0

## 2020-01-13 ENCOUNTER — OFFICE VISIT (OUTPATIENT)
Dept: FAMILY MEDICINE | Facility: CLINIC | Age: 60
End: 2020-01-13
Payer: COMMERCIAL

## 2020-01-13 VITALS
HEART RATE: 71 BPM | BODY MASS INDEX: 27.49 KG/M2 | OXYGEN SATURATION: 96 % | RESPIRATION RATE: 16 BRPM | DIASTOLIC BLOOD PRESSURE: 88 MMHG | SYSTOLIC BLOOD PRESSURE: 160 MMHG | TEMPERATURE: 98.2 F | WEIGHT: 192 LBS | HEIGHT: 70 IN

## 2020-01-13 DIAGNOSIS — Z00.00 ROUTINE GENERAL MEDICAL EXAMINATION AT A HEALTH CARE FACILITY: Primary | ICD-10-CM

## 2020-01-13 DIAGNOSIS — F41.9 ANXIETY: ICD-10-CM

## 2020-01-13 DIAGNOSIS — F32.0 MILD MAJOR DEPRESSION (H): ICD-10-CM

## 2020-01-13 DIAGNOSIS — F51.04 PSYCHOPHYSIOLOGICAL INSOMNIA: ICD-10-CM

## 2020-01-13 DIAGNOSIS — E03.9 HYPOTHYROIDISM, UNSPECIFIED TYPE: ICD-10-CM

## 2020-01-13 DIAGNOSIS — H91.90 HEARING LOSS, UNSPECIFIED HEARING LOSS TYPE, UNSPECIFIED LATERALITY: ICD-10-CM

## 2020-01-13 LAB
ALBUMIN SERPL-MCNC: 3.9 G/DL (ref 3.4–5)
ALP SERPL-CCNC: 154 U/L (ref 40–150)
ALT SERPL W P-5'-P-CCNC: 38 U/L (ref 0–50)
ANION GAP SERPL CALCULATED.3IONS-SCNC: 5 MMOL/L (ref 3–14)
AST SERPL W P-5'-P-CCNC: 29 U/L (ref 0–45)
BASOPHILS # BLD AUTO: 0 10E9/L (ref 0–0.2)
BASOPHILS NFR BLD AUTO: 0.4 %
BILIRUB DIRECT SERPL-MCNC: 0.1 MG/DL (ref 0–0.2)
BILIRUB SERPL-MCNC: 0.5 MG/DL (ref 0.2–1.3)
BUN SERPL-MCNC: 13 MG/DL (ref 7–30)
CALCIUM SERPL-MCNC: 9.4 MG/DL (ref 8.5–10.1)
CHLORIDE SERPL-SCNC: 106 MMOL/L (ref 94–109)
CHOLEST SERPL-MCNC: 215 MG/DL
CO2 SERPL-SCNC: 30 MMOL/L (ref 20–32)
CREAT SERPL-MCNC: 0.88 MG/DL (ref 0.52–1.04)
DIFFERENTIAL METHOD BLD: ABNORMAL
EOSINOPHIL # BLD AUTO: 0.1 10E9/L (ref 0–0.7)
EOSINOPHIL NFR BLD AUTO: 1 %
ERYTHROCYTE [DISTWIDTH] IN BLOOD BY AUTOMATED COUNT: 14.2 % (ref 10–15)
GFR SERPL CREATININE-BSD FRML MDRD: 71 ML/MIN/{1.73_M2}
GLUCOSE SERPL-MCNC: 90 MG/DL (ref 70–99)
HCT VFR BLD AUTO: 48.1 % (ref 35–47)
HDLC SERPL-MCNC: 75 MG/DL
HGB BLD-MCNC: 14.5 G/DL (ref 11.7–15.7)
LDLC SERPL CALC-MCNC: 119 MG/DL
LYMPHOCYTES # BLD AUTO: 1.6 10E9/L (ref 0.8–5.3)
LYMPHOCYTES NFR BLD AUTO: 20.1 %
MCH RBC QN AUTO: 30.5 PG (ref 26.5–33)
MCHC RBC AUTO-ENTMCNC: 30.1 G/DL (ref 31.5–36.5)
MCV RBC AUTO: 101 FL (ref 78–100)
MONOCYTES # BLD AUTO: 0.6 10E9/L (ref 0–1.3)
MONOCYTES NFR BLD AUTO: 7 %
NEUTROPHILS # BLD AUTO: 5.6 10E9/L (ref 1.6–8.3)
NEUTROPHILS NFR BLD AUTO: 71.5 %
NONHDLC SERPL-MCNC: 140 MG/DL
PLATELET # BLD AUTO: 204 10E9/L (ref 150–450)
POTASSIUM SERPL-SCNC: 4 MMOL/L (ref 3.4–5.3)
PROT SERPL-MCNC: 7.8 G/DL (ref 6.8–8.8)
RBC # BLD AUTO: 4.75 10E12/L (ref 3.8–5.2)
SODIUM SERPL-SCNC: 141 MMOL/L (ref 133–144)
T4 FREE SERPL-MCNC: 1.34 NG/DL (ref 0.76–1.46)
TRIGL SERPL-MCNC: 107 MG/DL
TSH SERPL DL<=0.005 MIU/L-ACNC: 0.39 MU/L (ref 0.4–4)
WBC # BLD AUTO: 7.9 10E9/L (ref 4–11)

## 2020-01-13 PROCEDURE — 84443 ASSAY THYROID STIM HORMONE: CPT | Performed by: FAMILY MEDICINE

## 2020-01-13 PROCEDURE — 36415 COLL VENOUS BLD VENIPUNCTURE: CPT | Performed by: FAMILY MEDICINE

## 2020-01-13 PROCEDURE — 85025 COMPLETE CBC W/AUTO DIFF WBC: CPT | Performed by: FAMILY MEDICINE

## 2020-01-13 PROCEDURE — 84439 ASSAY OF FREE THYROXINE: CPT | Performed by: FAMILY MEDICINE

## 2020-01-13 PROCEDURE — 80048 BASIC METABOLIC PNL TOTAL CA: CPT | Performed by: FAMILY MEDICINE

## 2020-01-13 PROCEDURE — 80076 HEPATIC FUNCTION PANEL: CPT | Performed by: FAMILY MEDICINE

## 2020-01-13 PROCEDURE — 99396 PREV VISIT EST AGE 40-64: CPT | Performed by: FAMILY MEDICINE

## 2020-01-13 PROCEDURE — 80061 LIPID PANEL: CPT | Performed by: FAMILY MEDICINE

## 2020-01-13 RX ORDER — LEVOTHYROXINE SODIUM 125 UG/1
TABLET ORAL
Qty: 90 TABLET | Refills: 3 | Status: SHIPPED | OUTPATIENT
Start: 2020-01-13 | End: 2021-03-04

## 2020-01-13 RX ORDER — TEMAZEPAM 15 MG/1
CAPSULE ORAL
Qty: 60 CAPSULE | Refills: 5 | Status: SHIPPED | OUTPATIENT
Start: 2020-01-13 | End: 2020-07-20

## 2020-01-13 ASSESSMENT — ENCOUNTER SYMPTOMS
NERVOUS/ANXIOUS: 0
BREAST MASS: 0
HEARTBURN: 0
DYSURIA: 0
WEAKNESS: 0
DIARRHEA: 0
DIZZINESS: 0
MYALGIAS: 0
SORE THROAT: 0
PARESTHESIAS: 0
COUGH: 1
JOINT SWELLING: 0
HEADACHES: 0
NAUSEA: 0
CHILLS: 0
SHORTNESS OF BREATH: 0
FREQUENCY: 0
HEMATOCHEZIA: 0
HEMATURIA: 0
PALPITATIONS: 0
ARTHRALGIAS: 0
CONSTIPATION: 0
ABDOMINAL PAIN: 0
EYE PAIN: 0
FEVER: 0

## 2020-01-13 ASSESSMENT — MIFFLIN-ST. JEOR: SCORE: 1516.16

## 2020-01-13 NOTE — PATIENT INSTRUCTIONS
Preventive Health Recommendations  Female Ages 50 - 64    Yearly exam: See your health care provider every year in order to  o Review health changes.   o Discuss preventive care.    o Review your medicines if your doctor has prescribed any.      Get a Pap test every three years (unless you have an abnormal result and your provider advises testing more often).    If you get Pap tests with HPV test, you only need to test every 5 years, unless you have an abnormal result.     You do not need a Pap test if your uterus was removed (hysterectomy) and you have not had cancer.    You should be tested each year for STDs (sexually transmitted diseases) if you're at risk.     Have a mammogram every 1 to 2 years.    Have a colonoscopy at age 50, or have a yearly FIT test (stool test). These exams screen for colon cancer.      Have a cholesterol test every 5 years, or more often if advised.    Have a diabetes test (fasting glucose) every three years. If you are at risk for diabetes, you should have this test more often.     If you are at risk for osteoporosis (brittle bone disease), think about having a bone density scan (DEXA).    Shots: Get a flu shot each year. Get a tetanus shot every 10 years.    Nutrition:     Eat at least 5 servings of fruits and vegetables each day.    Eat whole-grain bread, whole-wheat pasta and brown rice instead of white grains and rice.    Get adequate Calcium and Vitamin D.     Lifestyle    Exercise at least 150 minutes a week (30 minutes a day, 5 days a week). This will help you control your weight and prevent disease.    Limit alcohol to one drink per day.    No smoking.     Wear sunscreen to prevent skin cancer.     See your dentist every six months for an exam and cleaning.    See your eye doctor every 1 to 2 years.    [facility] Mammo Schedule    [facility] ~ [phone]  One Day weekly- Alternating Days    [place] ~ [phone]  Every other Monday or Wednesday   & one Saturday  morning a month    [place] ~ [phone]  Every Other Monday Afternoon    ~ [place] ~   Every other Monday Morning    [place] ~ [phone]  Every Monday morning  Every Tuesday afternoon  Wed, Thurs, Friday morning & afternoon  Updated 06/18/18      Get BP cuff and then make a nurse visit and check BP   Follow BP at home and send me results    Labs today     Set up colonoscopy

## 2020-01-13 NOTE — NURSING NOTE
"Chief Complaint   Patient presents with     Physical       Initial BP (!) 160/88 (BP Location: Right arm, Patient Position: Chair, Cuff Size: Adult Large)   Pulse 71   Temp 98.2  F (36.8  C) (Tympanic)   Resp 16   Ht 1.778 m (5' 10\")   Wt 87.1 kg (192 lb)   LMP 09/21/2012   SpO2 96%   BMI 27.55 kg/m   Estimated body mass index is 27.55 kg/m  as calculated from the following:    Height as of this encounter: 1.778 m (5' 10\").    Weight as of this encounter: 87.1 kg (192 lb).    Patient presents to the clinic using No DME    Health Maintenance that is potentially due pending provider review:  Mammogram and Colonoscopy/FIT    n/a    Is there anyone who you would like to be able to receive your results? No  If yes have patient fill out VERA    "

## 2020-01-13 NOTE — PROGRESS NOTES
SUBJECTIVE:   CC: Stephanie Trejo is an 59 year old woman who presents for preventive health visit.   PHQ-9 ([employer]) 1/11/2020   1.  Little interest or pleasure in doing things 0   2.  Feeling down, depressed, or hopeless 0   3.  Trouble falling or staying asleep, or sleeping too much 0   4.  Feeling tired or having little energy 0   5.  Poor appetite or overeating 0   6.  Feeling bad about yourself 0   7.  Trouble concentrating 0   8.  Moving slowly or restless 0   9.  Suicidal or self-harm thoughts 0   PHQ-9 Total Score 0     SHERON-7   [employer], 2002; Used with Permission) 1/11/2020   1. Feeling nervous, anxious, or on edge 0   2. Not being able to stop or control worrying 0   3. Worrying too much about different things 0   4. Trouble relaxing 0   5. Being so restless that it is hard to sit still 0   6. Becoming easily annoyed or irritable 0   7. Feeling afraid, as if something awful might happen 0   SHERON-7 Total Score 0       Healthy Habits:     Getting at least 3 servings of Calcium per day:  NO    Bi-annual eye exam:  Yes    Dental care twice a year:  Yes    Sleep apnea or symptoms of sleep apnea:  None    Diet:  Low fat/cholesterol    Frequency of exercise:  2-3 days/week    Duration of exercise:  Less than 15 minutes    Taking medications regularly:  Yes    Medication side effects:  None    PHQ-2 Total Score: 0    Additional concerns today:  No      Hearing concerns         Today's PHQ-2 Score:   PHQ-2 ( 1999 [employer]) 1/11/2020   Q1: Little interest or pleasure in doing things 0   Q2: Feeling down, depressed or hopeless 0   PHQ-2 Score 0   Q1: Little interest or pleasure in doing things Not at all   Q2: Feeling down, depressed or hopeless Not at all   PHQ-2 Score 0       Abuse: Current or Past(Physical, Sexual or Emotional)- No  Do you feel safe in your environment? Yes        Social History     Tobacco Use     Smoking status: Never Smoker     Smokeless tobacco: Never Used   Substance Use Topics     Alcohol  use: No     Alcohol/week: 0.0 standard drinks         No flowsheet data found.    Reviewed orders with patient.  Reviewed health maintenance and updated orders accordingly - Yes  Labs reviewed in [facility]    Mammogram Screening: Patient over age 50, mutual decision to screen reflected in health maintenance.    Pertinent mammograms are reviewed under the imaging tab.  Patient's last menstrual period was 09/21/2012.    History of abnormal Pap smear:   NO - age 30- 65 PAP every 3 years recommended  Last 3 Pap and HPV Results:   PAP / HPV Latest Ref Rng & Units 11/29/2016 10/31/2013 10/18/2012   PAP - NIL NIL NIL   HPV 16 DNA NEG Negative - -   HPV 18 DNA NEG Negative - -   OTHER HR HPV NEG Negative - -     PAP / HPV Latest Ref Rng & Units 11/29/2016 10/31/2013 10/18/2012   PAP - NIL NIL NIL   HPV 16 DNA NEG Negative - -   HPV 18 DNA NEG Negative - -   OTHER HR HPV NEG Negative - -     Reviewed and updated as needed this visit by clinical staff  Tobacco  Allergies  Meds  Problems  Med Hx  Surg Hx  Fam Hx         Reviewed and updated as needed this visit by Provider  Tobacco  Allergies  Meds  Problems  Med Hx  Surg Hx  Fam Hx            Review of Systems   Constitutional: Negative for chills and fever.   HENT: Positive for hearing loss. Negative for congestion, ear pain and sore throat.    Eyes: Negative for pain and visual disturbance.   Respiratory: Positive for cough. Negative for shortness of breath.    Cardiovascular: Negative for chest pain, palpitations and peripheral edema.   Gastrointestinal: Negative for abdominal pain, constipation, diarrhea, heartburn, hematochezia and nausea.   Breasts:  Negative for tenderness, breast mass and discharge.   Genitourinary: Negative for dysuria, frequency, genital sores, hematuria, pelvic pain, urgency, vaginal bleeding and vaginal discharge.   Musculoskeletal: Negative for arthralgias, joint swelling and myalgias.   Skin: Negative for rash.   Neurological: Negative  "for dizziness, weakness, headaches and paresthesias.   Psychiatric/Behavioral: Negative for mood changes. The patient is not nervous/anxious.           OBJECTIVE:   BP (!) 160/88 (BP Location: Right arm, Patient Position: Chair, Cuff Size: Adult Large)   Pulse 71   Temp 98.2  F (36.8  C) (Tympanic)   Resp 16   Ht 1.778 m (5' 10\")   Wt 87.1 kg (192 lb)   LMP 09/21/2012   SpO2 96%   BMI 27.55 kg/m    Physical Exam  GENERAL APPEARANCE: healthy, alert and no distress  EYES: Eyes grossly normal to inspection, PERRL and conjunctivae and sclerae normal  HENT: ear canals and TM's normal, nose and mouth without ulcers or lesions, oropharynx clear and oral mucous membranes moist  NECK: no adenopathy, no asymmetry, masses, or scars and thyroid normal to palpation  RESP: lungs clear to auscultation - no rales, rhonchi or wheezes  BREAST: normal without masses, tenderness or nipple discharge and no palpable axillary masses or adenopathy  CV: regular rate and rhythm, normal S1 S2, no S3 or S4, no murmur, click or rub, no peripheral edema and peripheral pulses strong  ABDOMEN: soft, nontender, no hepatosplenomegaly, no masses and bowel sounds normal  MS: no musculoskeletal defects are noted and gait is age appropriate without ataxia  SKIN: multiple seb K all over torso   NEURO: Normal strength and tone, sensory exam grossly normal, mentation intact and speech normal  PSYCH: mentation appears normal and affect normal/bright    Diagnostic Test Results:  Labs reviewed in Epic    ASSESSMENT/PLAN:   1. Routine general medical examination at a health care facility    - Lipid panel reflex to direct LDL Fasting  - GASTROENTEROLOGY ADULT REF PROCEDURE ONLY [facility] [phone]    2. Hypothyroidism, unspecified type  Adjust meds as indicated by above labs.   - levothyroxine (SYNTHROID/LEVOTHROID) 125 MCG tablet; TAKE 1 TABLET(125 MCG) BY MOUTH DAILY  Dispense: 90 tablet; Refill: 3  - TSH with free T4 reflex    3. " "Psychophysiological insomnia  Stable no change in treatment plan.   - temazepam (RESTORIL) 15 MG capsule; TAKE 1-2 CAPSULES BY MOUTH NIGHTLY AS NEEDED FOR SLEEP  Dispense: 60 capsule; Refill: 5    4. Anxiety  Stable no change in treatment plan.   - temazepam (RESTORIL) 15 MG capsule; TAKE 1-2 CAPSULES BY MOUTH NIGHTLY AS NEEDED FOR SLEEP  Dispense: 60 capsule; Refill: 5  - Hepatic panel  - Basic metabolic panel  - CBC with platelets differential    5. Mild major depression (H)  In remission       COUNSELING:  Reviewed preventive health counseling, as reflected in patient instructions    Estimated body mass index is 27.55 kg/m  as calculated from the following:    Height as of this encounter: 1.778 m (5' 10\").    Weight as of this encounter: 87.1 kg (192 lb).    Weight management plan: Discussed healthy diet and exercise guidelines     reports that she has never smoked. She has never used smokeless tobacco.      Counseling Resources:  ATP IV Guidelines  Pooled Cohorts Equation Calculator  Breast Cancer Risk Calculator  FRAX Risk Assessment  ICSI Preventive Guidelines  Dietary Guidelines for Americans, 2010  USDA's MyPlate  ASA Prophylaxis  Lung CA Screening    Radha Felton MD  [facility]  "

## 2020-01-14 ENCOUNTER — HOSPITAL ENCOUNTER (OUTPATIENT)
Dept: MAMMOGRAPHY | Facility: CLINIC | Age: 60
Discharge: HOME | End: 2020-01-14
Attending: FAMILY MEDICINE | Admitting: FAMILY MEDICINE
Payer: COMMERCIAL

## 2020-01-14 DIAGNOSIS — Z12.31 VISIT FOR SCREENING MAMMOGRAM: ICD-10-CM

## 2020-01-14 PROCEDURE — 77067 SCR MAMMO BI INCL CAD: CPT

## 2020-02-08 ENCOUNTER — MYC MEDICAL ADVICE (OUTPATIENT)
Dept: FAMILY MEDICINE | Facility: CLINIC | Age: 60
End: 2020-02-08

## 2020-02-17 ENCOUNTER — ANESTHESIA EVENT (OUTPATIENT)
Dept: GASTROENTEROLOGY | Facility: CLINIC | Age: 60
End: 2020-02-17
Payer: COMMERCIAL

## 2020-02-17 NOTE — ANESTHESIA PREPROCEDURE EVALUATION
Anesthesia Pre-Procedure Evaluation    Patient: Stephanie Trejo   MRN: [id number] : 1960          Preoperative Diagnosis: Routine general medical examination at a health care facility [Z00.00]    Procedure(s):  COLONOSCOPY    Past Medical History:   Diagnosis Date     CHF (congestive heart failure) (H) 2013     Depressive disorder      Hypertension      Mitral valve insufficiency, acquired      Other and unspecified disc disorder of lumbar region      Scoliosis (and kyphoscoliosis), idiopathic      Undiagnosed cardiac murmurs      Unspecified hypothyroidism      Past Surgical History:   Procedure Laterality Date     BACK SURGERY       COLONOSCOPY       GYN SURGERY       SOFT TISSUE SURGERY       SURGICAL HISTORY OF -            SURGICAL HISTORY OF -       Laparoscopy     SURGICAL HISTORY OF -       Hernia Repair      SURGICAL HISTORY OF -       Back Fusion T3 - L3       Anesthesia Evaluation     . Pt has had prior anesthetic. Type: General and MAC    No history of anesthetic complications          ROS/MED HX    ENT/Pulmonary:     (+)sleep apnea, , . .    Neurologic:  - neg neurologic ROS     Cardiovascular:     (+) Dyslipidemia, hypertension----. : . CHF . . :. valvular problems/murmurs type: MVP . Previous cardiac testing Echodate:results:   Interpretation Summary  EF 59% by planimetry but visually looks low normal at 50-55% No regional wall   motion abnormalities noted. Mild ascending aorta dilatation at 4.0 cm Trace   valvular insufficiency without stenosis. Findings are similar to those   reported on study dated 11date: results:ECG reviewed date: results: date: results:          METS/Exercise Tolerance:  >4 METS   Hematologic:  - neg hematologic  ROS       Musculoskeletal:  - neg musculoskeletal ROS (+)  other musculoskeletal- DDD, scoliosis      GI/Hepatic:  - neg GI/hepatic ROS       Renal/Genitourinary:  - ROS Renal section negative       Endo:     (+)  "thyroid problem hypothyroidism, Other Endocrine Disorder overweight.      Psychiatric:     (+) psychiatric history depression and anxiety      Infectious Disease:  - neg infectious disease ROS       Malignancy:      - no malignancy   Other: Comment: Hx of anaplasmosis   - neg other ROS                      Physical Exam  Normal systems: cardiovascular, pulmonary and dental    Airway   Mallampati: II    Dental     Cardiovascular       Pulmonary             Lab Results   Component Value Date    WBC 7.9 01/13/2020    HGB 14.5 01/13/2020    HCT 48.1 (H) 01/13/2020     01/13/2020     01/13/2020    POTASSIUM 4.0 01/13/2020    CHLORIDE 106 01/13/2020    CO2 30 01/13/2020    BUN 13 01/13/2020    CR 0.88 01/13/2020    GLC 90 01/13/2020    GABBIE 9.4 01/13/2020    MAG 2.3 06/27/2011    ALBUMIN 3.9 01/13/2020    PROTTOTAL 7.8 01/13/2020    ALT 38 01/13/2020    AST 29 01/13/2020    ALKPHOS 154 (H) 01/13/2020    BILITOTAL 0.5 01/13/2020    INR 1.3 (A) 08/01/2011    TSH 0.39 (L) 01/13/2020    T4 1.34 01/13/2020       Preop Vitals  BP Readings from Last 3 Encounters:   01/13/20 (!) 160/88   05/22/19 138/82   03/05/19 138/78    Pulse Readings from Last 3 Encounters:   01/13/20 71   05/22/19 77   02/22/19 72      Resp Readings from Last 3 Encounters:   01/13/20 16   05/22/19 16   02/22/19 16    SpO2 Readings from Last 3 Encounters:   01/13/20 96%   05/22/19 96%   02/05/19 95%      Temp Readings from Last 1 Encounters:   01/13/20 36.8  C (98.2  F) (Tympanic)    Ht Readings from Last 1 Encounters:   01/13/20 1.778 m (5' 10\")      Wt Readings from Last 1 Encounters:   01/13/20 87.1 kg (192 lb)    Estimated body mass index is 27.55 kg/m  as calculated from the following:    Height as of 1/13/20: 1.778 m (5' 10\").    Weight as of 1/13/20: 87.1 kg (192 lb).       Anesthesia Plan      History & Physical Review  History and physical reviewed and following examination; no interval change.    ASA Status:  3 .    NPO Status:  > 4 " hours    Plan for MAC Maintenance will be Balanced.  Reason for MAC:  Deep or markedly invasive procedure (G8)         Postoperative Care      Consents  Anesthetic plan, risks, benefits and alternatives discussed with:  Patient..                 KAY Barahona CRNA

## 2020-02-18 ENCOUNTER — ANESTHESIA (OUTPATIENT)
Dept: GASTROENTEROLOGY | Facility: CLINIC | Age: 60
End: 2020-02-18
Payer: COMMERCIAL

## 2020-02-18 ENCOUNTER — HOSPITAL ENCOUNTER (OUTPATIENT)
Facility: CLINIC | Age: 60
Discharge: HOME | End: 2020-02-18
Attending: SURGERY | Admitting: SURGERY
Payer: COMMERCIAL

## 2020-02-18 VITALS
SYSTOLIC BLOOD PRESSURE: 145 MMHG | RESPIRATION RATE: 16 BRPM | DIASTOLIC BLOOD PRESSURE: 79 MMHG | HEART RATE: 67 BPM | WEIGHT: 190 LBS | HEIGHT: 70 IN | OXYGEN SATURATION: 97 % | TEMPERATURE: 97.6 F | BODY MASS INDEX: 27.2 KG/M2

## 2020-02-18 LAB — COLONOSCOPY: NORMAL

## 2020-02-18 PROCEDURE — G0105 COLORECTAL SCRN; HI RISK IND: HCPCS | Performed by: SURGERY

## 2020-02-18 PROCEDURE — 37000008 ZZH ANESTHESIA TECHNICAL FEE, 1ST 30 MIN: Performed by: SURGERY

## 2020-02-18 PROCEDURE — 25000128 H RX IP 250 OP 636: Performed by: NURSE ANESTHETIST, CERTIFIED REGISTERED

## 2020-02-18 PROCEDURE — 25800030 ZZH RX IP 258 OP 636: Performed by: SURGERY

## 2020-02-18 PROCEDURE — 25000125 ZZHC RX 250: Performed by: NURSE ANESTHETIST, CERTIFIED REGISTERED

## 2020-02-18 PROCEDURE — 45378 DIAGNOSTIC COLONOSCOPY: CPT | Performed by: SURGERY

## 2020-02-18 PROCEDURE — 25000125 ZZHC RX 250: Performed by: SURGERY

## 2020-02-18 RX ORDER — PROPOFOL 10 MG/ML
INJECTION, EMULSION INTRAVENOUS PRN
Status: DISCONTINUED | OUTPATIENT
Start: 2020-02-18 | End: 2020-02-18

## 2020-02-18 RX ORDER — SODIUM CHLORIDE, SODIUM LACTATE, POTASSIUM CHLORIDE, CALCIUM CHLORIDE 600; 310; 30; 20 MG/100ML; MG/100ML; MG/100ML; MG/100ML
INJECTION, SOLUTION INTRAVENOUS CONTINUOUS
Status: DISCONTINUED | OUTPATIENT
Start: 2020-02-18 | End: 2020-02-18 | Stop reason: HOSPADM

## 2020-02-18 RX ORDER — GLYCOPYRROLATE 0.2 MG/ML
INJECTION, SOLUTION INTRAMUSCULAR; INTRAVENOUS PRN
Status: DISCONTINUED | OUTPATIENT
Start: 2020-02-18 | End: 2020-02-18

## 2020-02-18 RX ORDER — LIDOCAINE HYDROCHLORIDE 10 MG/ML
INJECTION, SOLUTION INFILTRATION; PERINEURAL PRN
Status: DISCONTINUED | OUTPATIENT
Start: 2020-02-18 | End: 2020-02-18

## 2020-02-18 RX ORDER — LIDOCAINE 40 MG/G
CREAM TOPICAL
Status: DISCONTINUED | OUTPATIENT
Start: 2020-02-18 | End: 2020-02-18 | Stop reason: HOSPADM

## 2020-02-18 RX ORDER — ONDANSETRON 2 MG/ML
4 INJECTION INTRAMUSCULAR; INTRAVENOUS
Status: DISCONTINUED | OUTPATIENT
Start: 2020-02-18 | End: 2020-02-18 | Stop reason: HOSPADM

## 2020-02-18 RX ORDER — PROPOFOL 10 MG/ML
INJECTION, EMULSION INTRAVENOUS CONTINUOUS PRN
Status: DISCONTINUED | OUTPATIENT
Start: 2020-02-18 | End: 2020-02-18

## 2020-02-18 RX ADMIN — GLYCOPYRROLATE 0.2 MG: 0.2 INJECTION, SOLUTION INTRAMUSCULAR; INTRAVENOUS at 09:52

## 2020-02-18 RX ADMIN — LIDOCAINE HYDROCHLORIDE 0.2 ML: 10 INJECTION, SOLUTION EPIDURAL; INFILTRATION; INTRACAUDAL; PERINEURAL at 08:45

## 2020-02-18 RX ADMIN — SODIUM CHLORIDE, POTASSIUM CHLORIDE, SODIUM LACTATE AND CALCIUM CHLORIDE: 600; 310; 30; 20 INJECTION, SOLUTION INTRAVENOUS at 08:46

## 2020-02-18 RX ADMIN — PROPOFOL 200 MCG/KG/MIN: 10 INJECTION, EMULSION INTRAVENOUS at 09:52

## 2020-02-18 RX ADMIN — LIDOCAINE HYDROCHLORIDE 30 MG: 10 INJECTION, SOLUTION INFILTRATION; PERINEURAL at 09:52

## 2020-02-18 RX ADMIN — PROPOFOL 40 MG: 10 INJECTION, EMULSION INTRAVENOUS at 09:54

## 2020-02-18 ASSESSMENT — MIFFLIN-ST. JEOR: SCORE: 1507.08

## 2020-02-18 NOTE — ANESTHESIA POSTPROCEDURE EVALUATION
Patient: Stephanie Trejo    Procedure(s):  COLONOSCOPY    Diagnosis:Routine general medical examination at a health care facility [Z00.00]  Diagnosis Additional Information: No value filed.    Anesthesia Type:  No value filed.    Note:  Anesthesia Post Evaluation    Patient location during evaluation: Phase 2  Patient participation: Able to fully participate in evaluation  Level of consciousness: awake  Pain management: adequate  Airway patency: patent  Cardiovascular status: acceptable and hemodynamically stable  Respiratory status: acceptable, room air and spontaneous ventilation  Hydration status: acceptable  PONV: none     Anesthetic complications: None          Last vitals:  Vitals:    02/18/20 0821 02/18/20 0845   BP: (!) 178/117 (!) 152/95   Resp: 18    Temp: 36.4  C (97.6  F)    SpO2: 96%          Electronically Signed By: KAY Mendosa CRNA  February 18, 2020  9:54 AM

## 2020-02-18 NOTE — ANESTHESIA CARE TRANSFER NOTE
Patient: Stephanie Trejo    Procedure(s):  COLONOSCOPY    Diagnosis: Routine general medical examination at a health care facility [Z00.00]  Diagnosis Additional Information: No value filed.    Anesthesia Type:   No value filed.     Note:  Airway :Room Air  Patient transferred to:Phase II  Handoff Report: Identifed the Patient, Identified the Reponsible Provider, Reviewed the pertinent medical history, Discussed the surgical course, Reviewed Intra-OP anesthesia mangement and issues during anesthesia, Set expectations for post-procedure period and Allowed opportunity for questions and acknowledgement of understanding      Vitals: (Last set prior to Anesthesia Care Transfer)    CRNA VITALS  2/18/2020 0940 - 2/18/2020 1010      2/18/2020             Pulse:  68    Ht Rate:  68    SpO2:  99 %                Electronically Signed By: KAY Mendosa CRNA  February 18, 2020  10:10 AM

## 2020-02-18 NOTE — BRIEF OP NOTE
[facility]   Brief Operative Note    Pre-operative diagnosis: Routine general medical examination at a health care facility [Z00.00]   Post-operative diagnosis normal colon     Procedure: Procedure(s):  COLONOSCOPY   Surgeon(s): Surgeon(s) and Role:     * Shaun Torrez MD - Primary   Estimated blood loss: * No values recorded between 2/18/2020 12:00 AM and 2/18/2020 10:11 AM *    Specimens: * No specimens in log *   Findings: Normal colon

## 2020-02-18 NOTE — H&P
"59 year old year old female here for colonoscopy for screening.    Patient Active Problem List   Diagnosis     Hypothyroidism     Other and unspecified disc disorder of lumbar region     Essential hypertension     CAMELIA (obstructive sleep apnea)     CARDIOVASCULAR SCREENING; LDL GOAL LESS THAN 100     Mitral valve regurgitation     Anaplasmosis     Abnormal uterine bleeding     Mild major depression (H)     Counseling regarding advanced directives     Anxiety       Past Medical History:   Diagnosis Date     CHF (congestive heart failure) (H) 2013     Depressive disorder      Hypertension      Mitral valve insufficiency, acquired      Other and unspecified disc disorder of lumbar region      Scoliosis (and kyphoscoliosis), idiopathic      Undiagnosed cardiac murmurs      Unspecified hypothyroidism        Past Surgical History:   Procedure Laterality Date     BACK SURGERY       COLONOSCOPY       GYN SURGERY       SOFT TISSUE SURGERY       SURGICAL HISTORY OF -            SURGICAL HISTORY OF -       Laparoscopy     SURGICAL HISTORY OF -       Hernia Repair      SURGICAL HISTORY OF -       Back Fusion T3 - L3       @[facility]@    No current outpatient medications on file.       Allergies   Allergen Reactions     Amlodipine Besylate Swelling     Swelling in ankles, Racing pulse also     Ammonia Hives and Swelling     Lisinopril      Dizziness and headaches     Penicillins Rash       Pt reports that she has never smoked. She has never used smokeless tobacco. She reports that she does not drink alcohol or use drugs.    Exam:  BP (!) 152/95   Temp 97.6  F (36.4  C) (Oral)   Resp 18   Ht 1.778 m (5' 10\")   Wt 86.2 kg (190 lb)   LMP 2012   SpO2 96%   BMI 27.26 kg/m      Awake, Alert OX3  Lungs - CTA bilaterally  CV - RRR, no murmurs, distal pulses intact  Abd - soft, non-distended, non-tender, +BS  Extr - No cyanosis or edema    A/P 59 year old year old female in need of colonoscopy for " screening. Risks, benefits, alternatives, and complications were discussed including the possibility of perforation and the patient agreed to proceed    Shaun Torrez MD

## 2020-03-03 ENCOUNTER — MYC MEDICAL ADVICE (OUTPATIENT)
Dept: FAMILY MEDICINE | Facility: CLINIC | Age: 60
End: 2020-03-03

## 2020-03-03 DIAGNOSIS — D22.9 ATYPICAL MOLE: ICD-10-CM

## 2020-03-03 DIAGNOSIS — K21.9 GASTROESOPHAGEAL REFLUX DISEASE, ESOPHAGITIS PRESENCE NOT SPECIFIED: ICD-10-CM

## 2020-03-03 DIAGNOSIS — I10 ESSENTIAL HYPERTENSION: Primary | ICD-10-CM

## 2020-03-03 RX ORDER — LOSARTAN POTASSIUM 25 MG/1
25 TABLET ORAL DAILY
Qty: 90 TABLET | Refills: 3 | Status: SHIPPED | OUTPATIENT
Start: 2020-03-03 | End: 2020-04-14 | Stop reason: ALTCHOICE

## 2020-03-19 ENCOUNTER — MYC MEDICAL ADVICE (OUTPATIENT)
Dept: FAMILY MEDICINE | Facility: CLINIC | Age: 60
End: 2020-03-19

## 2020-03-22 ENCOUNTER — HEALTH MAINTENANCE LETTER (OUTPATIENT)
Age: 60
End: 2020-03-22

## 2020-04-01 RX ORDER — LOSARTAN POTASSIUM 50 MG/1
100 TABLET ORAL DAILY
Qty: 180 TABLET | Refills: 3 | Status: SHIPPED | OUTPATIENT
Start: 2020-04-01 | End: 2020-04-14

## 2020-04-14 RX ORDER — LOSARTAN POTASSIUM 50 MG/1
50 TABLET ORAL DAILY
Qty: 180 TABLET | Refills: 3 | COMMUNITY
Start: 2020-04-14 | End: 2020-09-15

## 2020-04-14 RX ORDER — HYDROCHLOROTHIAZIDE 25 MG/1
25 TABLET ORAL DAILY
Qty: 90 TABLET | Refills: 1 | Status: SHIPPED | OUTPATIENT
Start: 2020-04-14 | End: 2020-07-10

## 2020-04-21 ENCOUNTER — APPOINTMENT (OUTPATIENT)
Dept: GENERAL RADIOLOGY | Facility: CLINIC | Age: 60
End: 2020-04-21
Attending: PHYSICIAN ASSISTANT
Payer: COMMERCIAL

## 2020-04-21 ENCOUNTER — HOSPITAL ENCOUNTER (EMERGENCY)
Facility: CLINIC | Age: 60
Discharge: HOME | End: 2020-04-21
Attending: PHYSICIAN ASSISTANT | Admitting: PHYSICIAN ASSISTANT
Payer: COMMERCIAL

## 2020-04-21 VITALS
HEIGHT: 70 IN | OXYGEN SATURATION: 95 % | TEMPERATURE: 98.3 F | BODY MASS INDEX: 27.49 KG/M2 | DIASTOLIC BLOOD PRESSURE: 79 MMHG | HEART RATE: 60 BPM | RESPIRATION RATE: 17 BRPM | WEIGHT: 192 LBS | SYSTOLIC BLOOD PRESSURE: 148 MMHG

## 2020-04-21 DIAGNOSIS — R07.89 CHEST PRESSURE: ICD-10-CM

## 2020-04-21 DIAGNOSIS — R42 VERTIGO: ICD-10-CM

## 2020-04-21 LAB
ALBUMIN SERPL-MCNC: 3.7 G/DL (ref 3.4–5)
ALP SERPL-CCNC: 128 U/L (ref 40–150)
ALT SERPL W P-5'-P-CCNC: 30 U/L (ref 0–50)
ANION GAP SERPL CALCULATED.3IONS-SCNC: 2 MMOL/L (ref 3–14)
AST SERPL W P-5'-P-CCNC: 22 U/L (ref 0–45)
BASOPHILS # BLD AUTO: 0 10E9/L (ref 0–0.2)
BASOPHILS NFR BLD AUTO: 0.6 %
BILIRUB SERPL-MCNC: 0.4 MG/DL (ref 0.2–1.3)
BUN SERPL-MCNC: 21 MG/DL (ref 7–30)
CALCIUM SERPL-MCNC: 9.3 MG/DL (ref 8.5–10.1)
CHLORIDE SERPL-SCNC: 104 MMOL/L (ref 94–109)
CO2 SERPL-SCNC: 33 MMOL/L (ref 20–32)
CREAT SERPL-MCNC: 1.01 MG/DL (ref 0.52–1.04)
DIFFERENTIAL METHOD BLD: NORMAL
EOSINOPHIL # BLD AUTO: 0.1 10E9/L (ref 0–0.7)
EOSINOPHIL NFR BLD AUTO: 0.7 %
ERYTHROCYTE [DISTWIDTH] IN BLOOD BY AUTOMATED COUNT: 13.2 % (ref 10–15)
GFR SERPL CREATININE-BSD FRML MDRD: 60 ML/MIN/{1.73_M2}
GLUCOSE SERPL-MCNC: 114 MG/DL (ref 70–99)
HCT VFR BLD AUTO: 42.9 % (ref 35–47)
HGB BLD-MCNC: 14.1 G/DL (ref 11.7–15.7)
IMM GRANULOCYTES # BLD: 0 10E9/L (ref 0–0.4)
IMM GRANULOCYTES NFR BLD: 0.1 %
LYMPHOCYTES # BLD AUTO: 1.7 10E9/L (ref 0.8–5.3)
LYMPHOCYTES NFR BLD AUTO: 24.1 %
MCH RBC QN AUTO: 30.6 PG (ref 26.5–33)
MCHC RBC AUTO-ENTMCNC: 32.9 G/DL (ref 31.5–36.5)
MCV RBC AUTO: 93 FL (ref 78–100)
MONOCYTES # BLD AUTO: 0.8 10E9/L (ref 0–1.3)
MONOCYTES NFR BLD AUTO: 10.9 %
NEUTROPHILS # BLD AUTO: 4.4 10E9/L (ref 1.6–8.3)
NEUTROPHILS NFR BLD AUTO: 63.6 %
NRBC # BLD AUTO: 0 10*3/UL
NRBC BLD AUTO-RTO: 0 /100
PLATELET # BLD AUTO: 185 10E9/L (ref 150–450)
POTASSIUM SERPL-SCNC: 3.6 MMOL/L (ref 3.4–5.3)
PROT SERPL-MCNC: 7.3 G/DL (ref 6.8–8.8)
RBC # BLD AUTO: 4.61 10E12/L (ref 3.8–5.2)
SODIUM SERPL-SCNC: 139 MMOL/L (ref 133–144)
TROPONIN I SERPL-MCNC: <0.015 UG/L (ref 0–0.04)
TSH SERPL DL<=0.005 MIU/L-ACNC: 0.6 MU/L (ref 0.4–4)
WBC # BLD AUTO: 6.9 10E9/L (ref 4–11)

## 2020-04-21 PROCEDURE — 93005 ELECTROCARDIOGRAM TRACING: CPT | Performed by: PHYSICIAN ASSISTANT

## 2020-04-21 PROCEDURE — 93010 ELECTROCARDIOGRAM REPORT: CPT | Mod: Z6 | Performed by: PHYSICIAN ASSISTANT

## 2020-04-21 PROCEDURE — 84443 ASSAY THYROID STIM HORMONE: CPT | Performed by: PHYSICIAN ASSISTANT

## 2020-04-21 PROCEDURE — 71046 X-RAY EXAM CHEST 2 VIEWS: CPT

## 2020-04-21 PROCEDURE — 80053 COMPREHEN METABOLIC PANEL: CPT | Performed by: PHYSICIAN ASSISTANT

## 2020-04-21 PROCEDURE — 85025 COMPLETE CBC W/AUTO DIFF WBC: CPT | Performed by: PHYSICIAN ASSISTANT

## 2020-04-21 PROCEDURE — 99285 EMERGENCY DEPT VISIT HI MDM: CPT | Mod: 25 | Performed by: PHYSICIAN ASSISTANT

## 2020-04-21 PROCEDURE — 84484 ASSAY OF TROPONIN QUANT: CPT | Performed by: PHYSICIAN ASSISTANT

## 2020-04-21 RX ORDER — ALBUTEROL SULFATE 90 UG/1
2 AEROSOL, METERED RESPIRATORY (INHALATION) EVERY 6 HOURS PRN
Qty: 1 INHALER | Refills: 0 | Status: SHIPPED | OUTPATIENT
Start: 2020-04-21 | End: 2020-08-11

## 2020-04-21 ASSESSMENT — ENCOUNTER SYMPTOMS
EYE PAIN: 0
NAUSEA: 0
APPETITE CHANGE: 0
LIGHT-HEADEDNESS: 1
WOUND: 0
SINUS PAIN: 0
ACTIVITY CHANGE: 0
HEADACHES: 0
EYE REDNESS: 0
PALPITATIONS: 1
EYE DISCHARGE: 0
CHILLS: 0
ABDOMINAL PAIN: 0
COUGH: 0
VOMITING: 0
FEVER: 0
SHORTNESS OF BREATH: 0
SORE THROAT: 0
COLOR CHANGE: 0
WHEEZING: 0
PHOTOPHOBIA: 0
EYE ITCHING: 0
DIARRHEA: 0
DIZZINESS: 1

## 2020-04-21 ASSESSMENT — MIFFLIN-ST. JEOR: SCORE: 1516.16

## 2020-04-21 NOTE — ED AVS SNAPSHOT
[facility] Emergency Department  [address] [address]  Phone:  [phone]  Fax:  [phone]                                    Stephanie Trejo   MRN: [id number]    Department:  [facility] Emergency Department   Date of Visit:  4/21/2020           After Visit Summary Signature Page    I have received my discharge instructions, and my questions have been answered. I have discussed any challenges I see with this plan with the nurse or doctor.    ..........................................................................................................................................  Patient/Patient Representative Signature      ..........................................................................................................................................  Patient Representative Print Name and Relationship to Patient    ..................................................               ................................................  Date                                   Time    ..........................................................................................................................................  Reviewed by Signature/Title    ...................................................              ..............................................  Date                                               Time          22EPIC Rev 08/18

## 2020-04-21 NOTE — DISCHARGE INSTRUCTIONS
You may use you tube video by Dr. Stephanie Montoya for vertigo treatment to assist with your dizziness.    To schedule your stress test you should call [phone]

## 2020-04-21 NOTE — ED PROVIDER NOTES
History     Chief Complaint   Patient presents with     Chest Pain     states has hx of htn and has been working with primary for that - unsuccessful - here today due to increased chest pressure     HPI  Stephanie Trejo is a 59 year old female with past medical history significant for HTN, hypothyroidism, mitral valve regurgitation, obstructive sleep apnea, anxiety, depression  who presents to the emergency department with concern over 3-day history of chest pain described as heaviness.  She also complains of concurrent fatigue, palpitations described as infrequent hard sharp heartbeat, lightheadedness described as sensation spinning.  She initially was unable to identify any aggravating or alleviating factors, however during stay in department dizziness was noted to be brought on by changes in position and last or several seconds, resolved spontaneously.  She also complains of intermittent paresthesias in her right arm.   She denies any headache, ear pain, shortness of breath, wheezing, nausea, vomiting, diarrhea, or abdominal pain, lower extremity edema.  She states that her blood pressure has been elevated as well.  She has been working with her primary care provider to adjust her blood pressure medication she last had change in her medication last week which included decreasing losartan from 100 mg to 50 mg and initiating 25 mg hydrochlorothiazide.  Her pain is generally improved in the morning when she wakes up and does worsen throughout the day.  It does not change significantly with exertion, eating or drinking.  She initially denied any other known aggravating or alleviating factors however later admitted pain did improve when she burps.    She did attempt to treat with ibuprofen last night without relief.   She is a non-smoker.  She has occasional alcohol use approximately 2 times monthly.  She denies any other stimulant use.  She denies any recent surgeries or prolonged inactivities.  No prior history  of VTE, no known cancer history and no hormone use.   She has had prior cardiac evaluation cardioversion for atrial fibrillation in 2011 which was attributed to anaplasmosis.  She did have ECHO 4/29/13   Interpretation Summary EF 59% by planimetry but visually looks low normal at 50-55% No regional wall  motion abnormalities noted. Mild ascending aorta dilatation at 4.0 cm Trace  valvular insufficiency without stenosis. Findings are similar to those reported on study dated 6/26/11 and Cardiac MRI 6/25/11 1. Borderline normal left ventricular systolic function, visually estimated ejection fraction about 50%. 2. Mild right ventricular enlargement and preserved systolic function. 3. No evidence of significant myocardial edema or scar.  5. Trivial pericardial effusion    Allergies:  Allergies   Allergen Reactions     Amlodipine Besylate Swelling     Swelling in ankles, Racing pulse also     Ammonia Hives and Swelling     Lisinopril      Dizziness and headaches     Penicillins Rash     Problem List:    Patient Active Problem List    Diagnosis Date Noted     Anxiety 12/04/2017     Priority: Medium     Counseling regarding advanced directives 07/28/2015     Priority: Medium     Information given to patient       Mild major depression (H) 04/21/2013     Priority: Medium     Abnormal uterine bleeding 12/05/2012     Priority: Medium     Stopped BCP 9/12; small period 10/12 and none since;  sono 11/12--fluid and debris in cavity; stripe 2mm    EMB--brown fluid returned with scant benign endometrium    Advised to call if recurrent bleeding; repeat sono in 4-6 mons for f/u       Anaplasmosis 08/14/2011     Priority: Medium     Hosp June 2011   Associated CHF and a fib       Mitral valve regurgitation 06/28/2011     Priority: Medium     CARDIOVASCULAR SCREENING; LDL GOAL LESS THAN 100 10/31/2010     Priority: Medium     CAMELIA (obstructive sleep apnea) 12/11/2009     Priority: Medium     1) Dx 2006, struggled with CPAP, stopped CPAP  after weight loss, now weight gain and recurrence of symptoms. Willing to try CPAP (but barrier wearing device). Mandibular advancement may not address nasal issues.   2) Sleep Study 1/6/10: Sleep latency 20 minutes without Ambien.  REM achieved.   REM latency 181.5 minutes.  Sleep efficiency 84.9%. Total sleep time 344 minutes. Sleep architecture:  Stage 1, 17.7% (5%), stage 2, 55.5% (45-55%), stage 3, 6.4% (15-20%), stage REM, 20.3% (20-25%).  Using rule 4B, AHI was 8.4, with significant desaturations down to 87%. RDI 16.7/h.  REM RDI 32.6, consistent with severe REM CAMELIA.  Supine RDI 16.7/h - stayed on back, consistent with moderate SUPINE CAMELIA.  Periodic Limb Movement Index 18.8/hour, PLMAI was 8.5/h. Pt  had back pain, often moves then the right leg first, then also left if not better.   3) Start Auto-CPAP       Essential hypertension 2006     Priority: Medium     Problem list name updated by automated process. Provider to review       Hypothyroidism 2005     Priority: Medium     Problem list name updated by automated process. Provider to review       Other and unspecified disc disorder of lumbar region 2005     Priority: Medium      Past Medical History:    Past Medical History:   Diagnosis Date     CHF (congestive heart failure) (H) 2013     Depressive disorder      Hypertension      Mitral valve insufficiency, acquired      Other and unspecified disc disorder of lumbar region      Scoliosis (and kyphoscoliosis), idiopathic      Undiagnosed cardiac murmurs      Unspecified hypothyroidism      Past Surgical History:    Past Surgical History:   Procedure Laterality Date     BACK SURGERY       COLONOSCOPY       COLONOSCOPY N/A 2020    Procedure: COLONOSCOPY;  Surgeon: Shaun Torrez MD;  Location: [facility]     GYN SURGERY       SOFT TISSUE SURGERY       SURGICAL HISTORY OF -            SURGICAL HISTORY OF -       Laparoscopy     SURGICAL HISTORY OF -       Hernia Repair       SURGICAL HISTORY OF -   01/98    Back Fusion T3 - L3     Family History:    Family History   Problem Relation Age of Onset     Lipids Mother      Thyroid Disease Mother      Eye Disorder Mother         macular degeneration     Colon Cancer Mother      Anesthesia Reaction Mother         allergic to some     Hypertension Father      Cardiovascular Father         MI     Osteoporosis Father      Alzheimer Disease Father      Genitourinary Problems Father         incontinence     Breast Cancer Maternal Grandmother      Cerebrovascular Disease Maternal Grandfather      Diabetes Paternal Grandmother      Gastrointestinal Disease Sister         colitis     Respiratory Daughter         asthma     Social History:  Marital Status:   [2]  Social History     Tobacco Use     Smoking status: Never Smoker     Smokeless tobacco: Never Used   Substance Use Topics     Alcohol use: No     Alcohol/week: 0.0 standard drinks     Drug use: No      Medications:    aspirin 81 MG chewable tablet  buPROPion (WELLBUTRIN XL) 150 MG 24 hr tablet  hydrochlorothiazide (HYDRODIURIL) 25 MG tablet  levothyroxine (SYNTHROID/LEVOTHROID) 125 MCG tablet  losartan (COZAAR) 50 MG tablet  temazepam (RESTORIL) 15 MG capsule      Review of Systems   Constitutional: Negative for activity change, appetite change, chills and fever.   HENT: Negative for congestion, ear pain, sinus pain and sore throat.    Eyes: Negative for photophobia, pain, discharge, redness, itching and visual disturbance.   Respiratory: Negative for cough, shortness of breath and wheezing.    Cardiovascular: Positive for chest pain and palpitations. Negative for leg swelling.   Gastrointestinal: Negative for abdominal pain, diarrhea, nausea and vomiting.   Genitourinary: Negative for dysuria and hematuria.   Musculoskeletal: Positive for back pain (chronic controlled).   Skin: Negative for color change, rash and wound.   Neurological: Positive for dizziness and light-headedness.  "Negative for headaches.     Physical Exam   BP: (!) 169/107  Pulse: 75  Temp: 98.3  F (36.8  C)  Resp: 16  Height: 177.8 cm (5' 10\")  Weight: 87.1 kg (192 lb)  SpO2: 97 %  Physical Exam  Vitals signs reviewed.   Constitutional:       General: She is not in acute distress.     Appearance: She is well-developed. She is not ill-appearing.   HENT:      Head: Normocephalic and atraumatic.   Eyes:      Extraocular Movements: Extraocular movements intact.      Pupils: Pupils are equal, round, and reactive to light.   Neck:      Musculoskeletal: Normal range of motion and neck supple.   Cardiovascular:      Rate and Rhythm: Normal rate and regular rhythm.      Heart sounds: Normal heart sounds. No murmur. No friction rub. No gallop.    Pulmonary:      Breath sounds: Normal breath sounds. No wheezing, rhonchi or rales.   Abdominal:      General: Abdomen is flat. Bowel sounds are normal. There is no distension.      Palpations: Abdomen is soft. There is no mass.      Tenderness: There is no abdominal tenderness. There is no guarding or rebound.   Skin:     General: Skin is warm and dry.   Neurological:      Mental Status: She is alert.   Psychiatric:         Mood and Affect: Mood normal.       ED Course        Procedures             EKG Interpretation:      Interpreted by Nella Medrano PA-C and Dr. Urbano Calderon   Time reviewed: 14:09  Symptoms at time of EKG: chest pain, lightheadedness   Rhythm: normal sinus   Rate: 79  Axis: Normal  Ectopy: premature ventricular contractions (frequent)  Conduction: normal  ST Segments/ T Waves: No ST-T wave changes  Q Waves: none  Comparison to prior: ectopic beats are new since 7/11/2011    Clinical Impression: sinus rhythm with frequent ectopic ventricular beats    Critical Care time:  none          2:38 PM blood pressure 142/82 on monitor     Results for orders placed or performed during the hospital encounter of 04/21/20   Chest XR,  PA & LAT     Status: None    Narrative    CHEST TWO " VIEWS 4/21/2020 3:03 PM     HISTORY: Chest pain    COMPARISON: November 20, 2017       Impression    IMPRESSION: There are no acute infiltrates. The cardiac silhouette is  not enlarged. Pulmonary vasculature is unremarkable.    OSMAR KENDALL MD   CBC with platelets, differential     Status: None   Result Value Ref Range    WBC 6.9 4.0 - 11.0 10e9/L    RBC Count 4.61 3.8 - 5.2 10e12/L    Hemoglobin 14.1 11.7 - 15.7 g/dL    Hematocrit 42.9 35.0 - 47.0 %    MCV 93 78 - 100 fl    MCH 30.6 26.5 - 33.0 pg    MCHC 32.9 31.5 - 36.5 g/dL    RDW 13.2 10.0 - 15.0 %    Platelet Count 185 150 - 450 10e9/L    Diff Method Automated Method     % Neutrophils 63.6 %    % Lymphocytes 24.1 %    % Monocytes 10.9 %    % Eosinophils 0.7 %    % Basophils 0.6 %    % Immature Granulocytes 0.1 %    Nucleated RBCs 0 0 /100    Absolute Neutrophil 4.4 1.6 - 8.3 10e9/L    Absolute Lymphocytes 1.7 0.8 - 5.3 10e9/L    Absolute Monocytes 0.8 0.0 - 1.3 10e9/L    Absolute Eosinophils 0.1 0.0 - 0.7 10e9/L    Absolute Basophils 0.0 0.0 - 0.2 10e9/L    Abs Immature Granulocytes 0.0 0 - 0.4 10e9/L    Absolute Nucleated RBC 0.0    Comprehensive metabolic panel     Status: Abnormal   Result Value Ref Range    Sodium 139 133 - 144 mmol/L    Potassium 3.6 3.4 - 5.3 mmol/L    Chloride 104 94 - 109 mmol/L    Carbon Dioxide 33 (H) 20 - 32 mmol/L    Anion Gap 2 (L) 3 - 14 mmol/L    Glucose 114 (H) 70 - 99 mg/dL    Urea Nitrogen 21 7 - 30 mg/dL    Creatinine 1.01 0.52 - 1.04 mg/dL    GFR Estimate 60 (L) >60 mL/min/[1.73_m2]    GFR Estimate If Black 69 >60 mL/min/[1.73_m2]    Calcium 9.3 8.5 - 10.1 mg/dL    Bilirubin Total 0.4 0.2 - 1.3 mg/dL    Albumin 3.7 3.4 - 5.0 g/dL    Protein Total 7.3 6.8 - 8.8 g/dL    Alkaline Phosphatase 128 40 - 150 U/L    ALT 30 0 - 50 U/L    AST 22 0 - 45 U/L   Troponin I     Status: None   Result Value Ref Range    Troponin I ES <0.015 0.000 - 0.045 ug/L   TSH with free T4 reflex     Status: None   Result Value Ref Range    TSH 0.60  0.40 - 4.00 mU/L     Medications - No data to display      HEART Score  Background  Calculates the overall risk of adverse event in patient's presenting with chest pain.  Based on 5 criteria (each assigned 0-2 points) including suspiciousness of history, EKG, age, risk factors and troponin.    Data  59 year old female  has Hypothyroidism; Other and unspecified disc disorder of lumbar region; Essential hypertension; CAMELIA (obstructive sleep apnea); CARDIOVASCULAR SCREENING; LDL GOAL LESS THAN 100; Mitral valve regurgitation; Anaplasmosis; Abnormal uterine bleeding; Mild major depression (H); Counseling regarding advanced directives; and Anxiety on their problem list.   reports that she has never smoked. She has never used smokeless tobacco.  family history includes Alzheimer Disease in her father; Anesthesia Reaction in her mother; Breast Cancer in her maternal grandmother; Cardiovascular in her father; Cerebrovascular Disease in her maternal grandfather; Colon Cancer in her mother; Diabetes in her paternal grandmother; Eye Disorder in her mother; Gastrointestinal Disease in her sister; Genitourinary Problems in her father; Hypertension in her father; Lipids in her mother; Osteoporosis in her father; Respiratory in her daughter; Thyroid Disease in her mother.  Lab Results   Component Value Date    TROPI <0.015 04/21/2020     Criteria   0-2 points for each of 5 items (maximum of 10 points):  Score 0- History slightly suspicious for coronary syndrome  Score 0- EKG Normal  Score 1- Age 45 to 65 years old  Score 1- One to 2 risk factors for atherosclerotic disease  Score 0- Within normal limits for troponin levels  Interpretation  Risk of adverse outcome  Heart Score: 2  Total Score 0-3- Adverse Outcome Risk 2.5% - Supports early discharge with appropriate follow-up    Assessments & Plan (with Medical Decision Making)     I have reviewed the nursing notes.  I have reviewed the findings, diagnosis, plan and need for follow  up with the patient.       Discharge Medication List as of 4/21/2020  4:29 PM      START taking these medications    Details   albuterol (PROAIR HFA/PROVENTIL HFA/VENTOLIN HFA) 108 (90 Base) MCG/ACT inhaler Inhale 2 puffs into the lungs every 6 hours as needed for shortness of breath / dyspnea or wheezing, Disp-1 Inhaler,R-0, E-Prescribe      omeprazole (PRILOSEC) 20 MG DR capsule Take 1 capsule (20 mg) by mouth daily for 14 days, Disp-14 capsule,R-0, E-Prescribe           Final diagnoses:   Chest pressure   Vertigo     59-year-old female presents to the emergency department with concern over 3 to 4-day history of chest pain described as pressure sensation which gradually worsens throughout the day and intermittent dizziness consistent with vertigo that is brought on by certain movements, changes in position.  She was noted to be hypertensive upon arrival, however did somewhat stabilize during course of her stay, remainder of vital signs were within normal limits.  Physical exam findings as described above were benign.  She had EKG which showed sinus rhythm with frequent ectopic beats.  Laboratory testing included noncontributory CBC, BMP, TSH chest x-ray was negative for acute infiltrate, pneumothorax, pleural effusion or change in cardiopulmonary vasculature.  Exact etiology of her chest pain is unclear, low concern for unstable angina, ACS however given patient's age risk factors, I did order outpatient stress echo.  She did not have any respiratory symptomsor risk factors to suggest PE.  Consider possibility of GERD/gastritis, medication side effect as symptoms corresponded to change in her antihypertensives, did also discuss bronchospasm but again unlikely due to absence respiratory symptoms.  She was discharged home stable with instructions for symptomatic treatment.  Trial of omeprazole, albuterol.  Follow up with PCP in one week as previously scheduled to discuss blood pressure and medications further.   Worrisome reasons to return to ER sooner discussed.      Disclaimer: This note consists of symbols derived from keyboarding, dictation, and/or voice recognition software. As a result, there may be errors in the script that have gone undetected.  Please consider this when interpreting information found in the chart.      4/21/2020   [facility] EMERGENCY DEPARTMENT     Nella Medrano PA-C  04/23/20 [id number]

## 2020-04-21 NOTE — TELEPHONE ENCOUNTER
Spoke with Stephanie, advised ED evaluation as she is having current chest achiness, fatigue and lightheadedness. She agrees and will go to ED

## 2020-04-21 NOTE — ED NOTES
Pt c/o dizziness, fatigue, and chest heaviness for past 3 days.  Cp is on/off but fatigue and dizziness are constant.  Cp is better in the morning.  No recent illness, cough or fever.  Pt has had changes/additions to blood pressure meds.  Pt states they are trying to control her high blood pressure.

## 2020-04-22 DIAGNOSIS — F32.0 MILD MAJOR DEPRESSION (H): ICD-10-CM

## 2020-04-22 RX ORDER — BUPROPION HYDROCHLORIDE 150 MG/1
TABLET ORAL
Qty: 90 TABLET | Refills: 1 | Status: SHIPPED | OUTPATIENT
Start: 2020-04-22 | End: 2020-08-11

## 2020-04-23 ASSESSMENT — ENCOUNTER SYMPTOMS
DYSURIA: 0
HEMATURIA: 0
BACK PAIN: 1

## 2020-05-19 DIAGNOSIS — I10 ESSENTIAL HYPERTENSION: ICD-10-CM

## 2020-05-19 LAB
ANION GAP SERPL CALCULATED.3IONS-SCNC: 2 MMOL/L (ref 3–14)
BUN SERPL-MCNC: 25 MG/DL (ref 7–30)
CALCIUM SERPL-MCNC: 8.9 MG/DL (ref 8.5–10.1)
CHLORIDE SERPL-SCNC: 105 MMOL/L (ref 94–109)
CO2 SERPL-SCNC: 32 MMOL/L (ref 20–32)
CREAT SERPL-MCNC: 0.97 MG/DL (ref 0.52–1.04)
GFR SERPL CREATININE-BSD FRML MDRD: 63 ML/MIN/{1.73_M2}
GLUCOSE SERPL-MCNC: 90 MG/DL (ref 70–99)
POTASSIUM SERPL-SCNC: 3.7 MMOL/L (ref 3.4–5.3)
SODIUM SERPL-SCNC: 139 MMOL/L (ref 133–144)

## 2020-05-19 PROCEDURE — 36415 COLL VENOUS BLD VENIPUNCTURE: CPT | Performed by: FAMILY MEDICINE

## 2020-05-19 PROCEDURE — 80048 BASIC METABOLIC PNL TOTAL CA: CPT | Performed by: FAMILY MEDICINE

## 2020-06-28 ENCOUNTER — MYC MEDICAL ADVICE (OUTPATIENT)
Dept: FAMILY MEDICINE | Facility: CLINIC | Age: 60
End: 2020-06-28

## 2020-07-10 DIAGNOSIS — I10 ESSENTIAL HYPERTENSION: ICD-10-CM

## 2020-07-10 RX ORDER — HYDROCHLOROTHIAZIDE 25 MG/1
TABLET ORAL
Qty: 90 TABLET | Refills: 1 | Status: SHIPPED | OUTPATIENT
Start: 2020-07-10 | End: 2021-03-31

## 2020-07-10 NOTE — TELEPHONE ENCOUNTER
Routing refill request to provider for review/approval because:  BPs    BP Readings from Last 6 Encounters:   04/21/20 (!) 148/79   02/18/20 (!) 145/79   01/13/20 (!) 160/88   05/22/19 138/82   03/05/19 138/78   02/26/19 160/82     Isabell PETERSON RN, BSN

## 2020-07-15 ENCOUNTER — OFFICE VISIT (OUTPATIENT)
Dept: DERMATOLOGY | Facility: CLINIC | Age: 60
End: 2020-07-15
Payer: COMMERCIAL

## 2020-07-15 ENCOUNTER — ALLIED HEALTH/NURSE VISIT (OUTPATIENT)
Dept: FAMILY MEDICINE | Facility: CLINIC | Age: 60
End: 2020-07-15
Payer: COMMERCIAL

## 2020-07-15 ENCOUNTER — TELEPHONE (OUTPATIENT)
Dept: DERMATOLOGY | Facility: CLINIC | Age: 60
End: 2020-07-15

## 2020-07-15 VITALS — OXYGEN SATURATION: 98 % | HEIGHT: 71 IN | HEART RATE: 54 BPM | BODY MASS INDEX: 26.78 KG/M2

## 2020-07-15 DIAGNOSIS — L57.0 AK (ACTINIC KERATOSIS): Primary | ICD-10-CM

## 2020-07-15 DIAGNOSIS — L82.0 INFLAMED SEBORRHEIC KERATOSIS: ICD-10-CM

## 2020-07-15 DIAGNOSIS — L82.1 SEBORRHEIC KERATOSIS: ICD-10-CM

## 2020-07-15 DIAGNOSIS — D22.9 MULTIPLE BENIGN NEVI: ICD-10-CM

## 2020-07-15 DIAGNOSIS — Z23 NEED FOR VACCINATION: Primary | ICD-10-CM

## 2020-07-15 DIAGNOSIS — D48.5 NEOPLASM OF UNCERTAIN BEHAVIOR OF SKIN: Primary | ICD-10-CM

## 2020-07-15 DIAGNOSIS — D18.01 CHERRY ANGIOMA: ICD-10-CM

## 2020-07-15 PROCEDURE — 99207 ZZC NO CHARGE NURSE ONLY: CPT

## 2020-07-15 PROCEDURE — 11103 TANGNTL BX SKIN EA SEP/ADDL: CPT | Mod: 59 | Performed by: PHYSICIAN ASSISTANT

## 2020-07-15 PROCEDURE — 99214 OFFICE O/P EST MOD 30 MIN: CPT | Mod: 25 | Performed by: PHYSICIAN ASSISTANT

## 2020-07-15 PROCEDURE — 90750 HZV VACC RECOMBINANT IM: CPT

## 2020-07-15 PROCEDURE — 88331 PATH CONSLTJ SURG 1 BLK 1SPC: CPT | Performed by: DERMATOLOGY

## 2020-07-15 PROCEDURE — 90746 HEPB VACCINE 3 DOSE ADULT IM: CPT

## 2020-07-15 PROCEDURE — 17110 DESTRUCTION B9 LES UP TO 14: CPT | Performed by: PHYSICIAN ASSISTANT

## 2020-07-15 PROCEDURE — 11102 TANGNTL BX SKIN SINGLE LES: CPT | Mod: 59 | Performed by: PHYSICIAN ASSISTANT

## 2020-07-15 PROCEDURE — 90471 IMMUNIZATION ADMIN: CPT

## 2020-07-15 PROCEDURE — 90472 IMMUNIZATION ADMIN EACH ADD: CPT

## 2020-07-15 PROCEDURE — 88305 TISSUE EXAM BY PATHOLOGIST: CPT | Mod: TC | Performed by: PHYSICIAN ASSISTANT

## 2020-07-15 NOTE — TELEPHONE ENCOUNTER
Pt was advised of this result and transferred to scheduling to schedule a cryo treatment.    Adenike Fracnes  [facility] RN

## 2020-07-15 NOTE — NURSING NOTE
Chief Complaint   Patient presents with     Imm/Inj     Shingrix #1, Hep B/Adult #3     Prior to immunization administration, verified patients identity using patient s name and date of birth. Please see Immunization Activity for additional information.     Screening Questionnaire for Adult Immunization    Are you sick today?   No   Do you have allergies to medications, food, a vaccine component or latex?   No   Have you ever had a serious reaction after receiving a vaccination?   No   Do you have a long-term health problem with heart, lung, kidney, or metabolic disease (e.g., diabetes), asthma, a blood disorder, no spleen, complement component deficiency, a cochlear implant, or a spinal fluid leak?  Are you on long-term aspirin therapy?   No   Do you have cancer, leukemia, HIV/AIDS, or any other immune system problem?   No   Do you have a parent, brother, or sister with an immune system problem?   No   In the past 3 months, have you taken medications that affect  your immune system, such as prednisone, other steroids, or anticancer drugs; drugs for the treatment of rheumatoid arthritis, Crohn s disease, or psoriasis; or have you had radiation treatments?   No   Have you had a seizure, or a brain or other nervous system problem?   No   During the past year, have you received a transfusion of blood or blood    products, or been given immune (gamma) globulin or antiviral drug?   No   For women: Are you pregnant or is there a chance you could become       pregnant during the next month?   No   Have you received any vaccinations in the past 4 weeks?   No     Immunization questionnaire answers were all negative.        Per orders of Dr. Lara, injection of SHINGRIX #1, HEP B/ADULT #3 given by Ana Quiñones CMA. Patient instructed to remain in clinic for 15 minutes afterwards, and to report any adverse reaction to me immediately.       Screening performed by Ana Quiñones CMA on 7/15/2020 at 10:12 AM.

## 2020-07-15 NOTE — PROGRESS NOTES
R forearm:There is hyperkeratosis and focal parakeratosis of the epidermis, overlying atypical keratinocytes,  by areas of orthokeratosis, there are scattered basal atypical keratinocytes: with varying degrees of overlying loss of maturation, hyperchromatism, pleomorphism, increased and abnormal mitoses, dyskeratosis.  The dermis shows a variable inflammatory infiltrate.     R forearm actinic keratosis cryo

## 2020-07-15 NOTE — TELEPHONE ENCOUNTER
----- Message from Oscar Echols MD sent at 7/15/2020 11:01 AM CDT -----  R forearm actinic keratosis cryo

## 2020-07-15 NOTE — PROGRESS NOTES
Stephanie Trejo is a 59 year old year old female patient here today for spots on right arm. She notes she has a spot that is also itchy for the past few months. She also notes older mole that she was recently told to have looked at by her mother's dermatologist. She denies any changes except some itching.  Patient has no other skin complaints today.  Remainder of the HPI, Meds, PMH, Allergies, FH, and SH was reviewed in chart.    Pertinent Hx:   No personal history of skin cancer.   Past Medical History:   Diagnosis Date     CHF (congestive heart failure) (H) 2013     Depressive disorder      Hypertension      Mitral valve insufficiency, acquired      Other and unspecified disc disorder of lumbar region      Scoliosis (and kyphoscoliosis), idiopathic      Undiagnosed cardiac murmurs      Unspecified hypothyroidism        Past Surgical History:   Procedure Laterality Date     BACK SURGERY       COLONOSCOPY       COLONOSCOPY N/A 2020    Procedure: COLONOSCOPY;  Surgeon: Shaun Torrez MD;  Location: [facility]     GYN SURGERY       SOFT TISSUE SURGERY       SURGICAL HISTORY OF -            SURGICAL HISTORY OF -       Laparoscopy     SURGICAL HISTORY OF -       Hernia Repair      SURGICAL HISTORY OF -       Back Fusion T3 - L3        Family History   Problem Relation Age of Onset     Lipids Mother      Thyroid Disease Mother      Eye Disorder Mother         macular degeneration     Colon Cancer Mother      Anesthesia Reaction Mother         allergic to some     Hypertension Father      Cardiovascular Father         MI     Osteoporosis Father      Alzheimer Disease Father      Genitourinary Problems Father         incontinence     Breast Cancer Maternal Grandmother      Cerebrovascular Disease Maternal Grandfather      Diabetes Paternal Grandmother      Gastrointestinal Disease Sister         colitis     Respiratory Daughter         asthma       Social History     Socioeconomic History      Marital status:      Spouse name: Not on file     Number of children: Not on file     Years of education: Not on file     Highest education level: Not on file   Occupational History     Not on file   Social Needs     Financial resource strain: Not on file     Food insecurity     Worry: Not on file     Inability: Not on file     Transportation needs     Medical: Not on file     Non-medical: Not on file   Tobacco Use     Smoking status: Never Smoker     Smokeless tobacco: Never Used   Substance and Sexual Activity     Alcohol use: No     Alcohol/week: 0.0 standard drinks     Drug use: No     Sexual activity: Yes     Partners: Male     Birth control/protection: Post-menopausal   Lifestyle     Physical activity     Days per week: Not on file     Minutes per session: Not on file     Stress: Not on file   Relationships     Social connections     Talks on phone: Not on file     Gets together: Not on file     Attends Sabianism service: Not on file     Active member of club or organization: Not on file     Attends meetings of clubs or organizations: Not on file     Relationship status: Not on file     Intimate partner violence     Fear of current or ex partner: Not on file     Emotionally abused: Not on file     Physically abused: Not on file     Forced sexual activity: Not on file   Other Topics Concern     Parent/sibling w/ CABG, MI or angioplasty before 65F 55M? No   Social History Narrative     Not on file       Outpatient Encounter Medications as of 7/15/2020   Medication Sig Dispense Refill     albuterol (PROAIR HFA/PROVENTIL HFA/VENTOLIN HFA) 108 (90 Base) MCG/ACT inhaler Inhale 2 puffs into the lungs every 6 hours as needed for shortness of breath / dyspnea or wheezing 1 Inhaler 0     aspirin 81 MG chewable tablet Take 1 tablet by mouth daily. 90 tablet      hydrochlorothiazide (HYDRODIURIL) 25 MG tablet TAKE 1 TABLET(25 MG) BY MOUTH DAILY 90 tablet 1     levothyroxine (SYNTHROID/LEVOTHROID) 125 MCG tablet  "TAKE 1 TABLET(125 MCG) BY MOUTH DAILY (Patient taking differently: Take 125 mcg by mouth daily ) 90 tablet 3     losartan (COZAAR) 50 MG tablet Take 1 tablet (50 mg) by mouth daily 180 tablet 3     temazepam (RESTORIL) 15 MG capsule TAKE 1-2 CAPSULES BY MOUTH NIGHTLY AS NEEDED FOR SLEEP 60 capsule 5     buPROPion (WELLBUTRIN XL) 150 MG 24 hr tablet TAKE 1 TABLET(150 MG) BY MOUTH DAILY (Patient not taking: Reported on 7/15/2020) 90 tablet 1     omeprazole (PRILOSEC) 20 MG DR capsule Take 1 capsule (20 mg) by mouth daily (Patient not taking: Reported on 7/15/2020) 60 capsule 0     [DISCONTINUED] omeprazole (PRILOSEC) 20 MG DR capsule Take 1 capsule (20 mg) by mouth daily for 14 days 14 capsule 0     No facility-administered encounter medications on file as of 7/15/2020.              Review Of Systems  Skin: As above  Eyes: negative  Ears/Nose/Throat: negative  Respiratory: No shortness of breath, dyspnea on exertion, cough, or hemoptysis  Cardiovascular: negative  Gastrointestinal: negative  Genitourinary: negative  Musculoskeletal: negative  Neurologic: negative  Psychiatric: negative  Hematologic/Lymphatic/Immunologic: negative  Endocrine: negative      O:   NAD, WDWN, Alert & Oriented, Mood & Affect wnl, Vitals stable   Here today alone   Pulse 54   Ht 1.803 m (5' 11\")   LMP 09/21/2012   SpO2 98%   BMI 26.78 kg/m     General appearance normal   Vitals stable   Alert, oriented and in no acute distress     0.5 cm brown thin brown papule with tail on right inferior elbow   0.4 cm pink scabbed macule on right forearm   Stuck on papules and brown macules on trunk and ext   Red papules on trunk  Brown papules and macules with regular pigment network on torso and extremities      The remainder of skin exam is normal       Eyes: Conjunctivae/lids:Normal     ENT: Lips, buccal mucosa, tongue: normal    MSK:Normal    Cardiovascular: peripheral edema none    Pulm: Breathing Normal    Lymph Nodes: No Head and Neck " Lymphadenopathy     Neuro/Psych: Orientation:Alert and Orientedx3 ; Mood/Affect:normal   A/P:  1. R/O AK vs excoriation vs NMSC on right forearm  TANGENTIAL BIOPSY IN HOUSE:  After consent, anesthesia with LEC and prep, tangential excision performed.  No complications and routine wound care.    2. R/O atypical nevus on right inferior elbow   TANGENTIAL BIOPSY SENT OUT:  After consent, anesthesia with LEC and prep, tangential excision performed and specimen sent out for permanent section histology.  No complications and routine wound care. Patient told to call our office in 1-2 weeks for result.      3. Inflamed seborrheic keratoses on chest x  10  LN2:  Treated with LN2 for 5s for 1-2 cycles. Warned risks of blistering, pain, pigment change, scarring, and incomplete resolution.  Advised patient to return if lesions do not completely resolve.  Wound care sheet given.  4. Seborrheic keratosis, lentigo, angioma, benign nevi   BENIGN LESIONS DISCUSSED WITH PATIENT:  I discussed the specifics of tumor, prognosis, and genetics of benign lesions.  I explained that treatment of these lesions would be purely cosmetic and not medically neccessary.  I discussed with patient different removal options including excision, cautery and /or laser.      Nature and genetics of benign skin lesions dicussed with patient.  Signs and Symptoms of skin cancer discussed with patient.  ABCDEs of melanoma reviewed with patient.  Patient encouraged to perform monthly skin exams.  UV precautions reviewed with patient.  Risks of non-melanoma skin cancer discussed with patient   Return to clinic pending biopsy results.     Stephanie to follow up with Primary Care provider regarding elevated blood pressure.

## 2020-07-15 NOTE — NURSING NOTE
"Chief Complaint   Patient presents with     Skin Check       Vitals:    07/15/20 1016   Pulse: 54   SpO2: 98%   Height: 1.803 m (5' 11\")     Wt Readings from Last 1 Encounters:   04/21/20 87.1 kg (192 lb)       Kelly Ocasio LPN.................7/15/2020    "

## 2020-07-15 NOTE — PATIENT INSTRUCTIONS
Wound Care Instructions     FOR SUPERFICIAL WOUNDS     [facility] [phone]    [facility] [phone]                       AFTER 24 HOURS YOU SHOULD REMOVE THE BANDAGE AND BEGIN DAILY DRESSING CHANGES AS FOLLOWS:     1) Remove Dressing.     2) Clean and dry the area with tap water using a Q-tip or sterile gauze pad.     3) Apply Vaseline, Aquaphor, Polysporin ointment or Bacitracin ointment over entire wound.  Do NOT use Neosporin ointment.     4) Cover the wound with a band-aid, or a sterile non-stick gauze pad and micropore paper tape      REPEAT THESE INSTRUCTIONS AT LEAST ONCE A DAY UNTIL THE WOUND HAS COMPLETELY HEALED.    It is an old wives tale that a wound heals better when it is exposed to air and allowed to dry out. The wound will heal faster with a better cosmetic result if it is kept moist with ointment and covered with a bandage.    **Do not let the wound dry out.**      Supplies Needed:      *Cotton tipped applicators (Q-tips)    *Polysporin Ointment or Bacitracin Ointment (NOT NEOSPORIN)    *Band-aids or non-stick gauze pads and micropore paper tape.      PATIENT INFORMATION:    During the healing process you will notice a number of changes. All wounds develop a small halo of redness surrounding the wound.  This means healing is occurring. Severe itching with extensive redness usually indicates sensitivity to the ointment or bandage tape used to dress the wound.  You should call our office if this develops.      Swelling  and/or discoloration around your surgical site is common, particularly when performed around the eye.    All wounds normally drain.  The larger the wound the more drainage there will be.  After 7-10 days, you will notice the wound beginning to shrink and new skin will begin to grow.  The wound is healed when you can see skin has formed over the entire area.  A healed wound has a healthy, shiny look to the surface and is red to dark pink in color  to normalize.  Wounds may take approximately 4-6 weeks to heal.  Larger wounds may take 6-8 weeks.  After the wound is healed you may discontinue dressing changes.    You may experience a sensation of tightness as your wound heals. This is normal and will gradually subside.    Your healed wound may be sensitive to temperature changes. This sensitivity improves with time, but if you re having a lot of discomfort, try to avoid temperature extremes.    Patients frequently experience itching after their wound appears to have healed because of the continue healing under the skin.  Plain Vaseline will help relieve the itching.        POSSIBLE COMPLICATIONS    BLEEDIN. Leave the bandage in place.  2. Use tightly rolled up gauze or a cloth to apply direct pressure over the bandage for 30  minutes.  3. Reapply pressure for an additional 30 minutes if necessary  4. Use additional gauze and tape to maintain pressure once the bleeding has stopped.    WOUND CARE INSTRUCTIONS   FOR CRYOSURGERY   This area treated with liquid nitrogen should form a blister (areas treated may or may not blister-skin may just turn dark and slough off). You do not need to bandage the area unless a blister forms and breaks (which may be a few days). When the blister breaks, begin daily dressing changes as follows:  1) Clean and dry the area with tap water using clean Q-tip or sterile gauze pad.   2) Apply Polysporin ointment or Bacitracin ointment over entire wound. Do NOT use Neosporin ointment.   3) Cover the wound with a band-aid or sterile non-stick gauze pad and micropore paper tape.   REPEAT THESE INSTRUCTIONS AT LEAST ONCE A DAY UNTIL THE WOUND HAS COMPLETELY HEALED.   It is an old wives tale that a wound heals better when it is exposed to air and allowed to dry out. The wound will heal faster with a better cosmetic result if it is kept moist with ointment and covered with a bandage.   Do not let the wound dry out.   IMPORTANT  INFORMATION ON REVERSE SIDE   Supplies Needed:   *Cotton tipped applicators (Q-tips)   *Polysporin ointment or Bacitracin ointment (NOT NEOSPORIN)   *Band-aids, or non stick gauze pads and micropore paper tape   PATIENT INFORMATION   During the healing process you will notice a number of changes. All wounds develop a small halo of redness surrounding the wound. This means healing is occurring. Severe itching with extensive redness usually indicates sensitivity to the ointment or bandage tape used to dress the wound. You should call our office if this develops.   Swelling and/or discoloration around your surgical site is common, particularly when performed around the eye.   All wounds normally drain. The larger the wound the more drainage there will be. After 7-10 days, you will notice the wound beginning to shrink and new skin will begin to grow. The wound is healed when you can see skin has formed over the entire area. A healed wound has a healthy, shiny look to the surface and is red to dark pink in color to normalize. Wounds may take approximately 4-6 weeks to heal. Larger wounds may take 6-8 weeks. After the wound is healed you may discontinue dressing changes.   You may experience a sensation of tightness as your wound heals. This is normal and will gradually subside.   Your healed wound may be sensitive to temperature changes. This sensitivity improves with time, but if you re having a lot of discomfort, try to avoid temperature extremes.   Patients frequently experience itching after their wound appears to have healed because of the continue healing under the skin. Plain Vaseline will help relieve the itching.

## 2020-07-15 NOTE — LETTER
7/15/2020         RE: Stephanie Trejo  [address]  [place] [address]        Dear Colleague,    Thank you for referring your patient, Stephanie Trejo, to the [facility]. Please see a copy of my visit note below.    R forearm:There is hyperkeratosis and focal parakeratosis of the epidermis, overlying atypical keratinocytes,  by areas of orthokeratosis, there are scattered basal atypical keratinocytes: with varying degrees of overlying loss of maturation, hyperchromatism, pleomorphism, increased and abnormal mitoses, dyskeratosis.  The dermis shows a variable inflammatory infiltrate.     R forearm actinic keratosis cryo     Again, thank you for allowing me to participate in the care of your patient.        Sincerely,        Oscar Echols MD

## 2020-07-17 ENCOUNTER — MYC MEDICAL ADVICE (OUTPATIENT)
Dept: DERMATOLOGY | Facility: CLINIC | Age: 60
End: 2020-07-17

## 2020-07-17 DIAGNOSIS — F41.9 ANXIETY: ICD-10-CM

## 2020-07-17 DIAGNOSIS — F51.04 PSYCHOPHYSIOLOGICAL INSOMNIA: ICD-10-CM

## 2020-07-18 LAB — COPATH REPORT: NORMAL

## 2020-07-20 RX ORDER — TEMAZEPAM 15 MG/1
CAPSULE ORAL
Qty: 60 CAPSULE | Refills: 0 | Status: SHIPPED | OUTPATIENT
Start: 2020-07-20 | End: 2020-09-15

## 2020-07-20 NOTE — TELEPHONE ENCOUNTER
Routing refill request to provider for review/approval because:  Drug not on the [facility] refill protocol     Isabell PETERSON RN, BSN

## 2020-08-11 ENCOUNTER — OFFICE VISIT (OUTPATIENT)
Dept: DERMATOLOGY | Facility: CLINIC | Age: 60
End: 2020-08-11
Payer: COMMERCIAL

## 2020-08-11 DIAGNOSIS — L57.0 ACTINIC KERATOSIS: Primary | ICD-10-CM

## 2020-08-11 PROCEDURE — 17000 DESTRUCT PREMALG LESION: CPT | Performed by: PHYSICIAN ASSISTANT

## 2020-08-11 NOTE — PROGRESS NOTES
Patient is here to treat biopsy proven actinic keratosis on right forearm.   Here today for cryo.   1. Actinic keratosis on right forearm  LN2:  Treated with LN2 for 5s for 1-2 cycles. Warned risks of blistering, pain, pigment change, scarring, and incomplete resolution.  Advised patient to return if lesions do not completely resolve.  Wound care sheet given.

## 2020-08-11 NOTE — LETTER
8/11/2020         RE: Stephanie Trejo  [address]  [place] [address]        Dear Colleague,    Thank you for referring your patient, Stephanie Trejo, to the [facility]. Please see a copy of my visit note below.    Patient is here to treat biopsy proven actinic keratosis on right forearm.   Here today for cryo.   1. Actinic keratosis on right forearm  LN2:  Treated with LN2 for 5s for 1-2 cycles. Warned risks of blistering, pain, pigment change, scarring, and incomplete resolution.  Advised patient to return if lesions do not completely resolve.  Wound care sheet given.      Again, thank you for allowing me to participate in the care of your patient.        Sincerely,        Sandy Saleem PA-C

## 2020-08-24 ENCOUNTER — DOCUMENTATION ONLY (OUTPATIENT)
Dept: OTHER | Facility: CLINIC | Age: 60
End: 2020-08-24

## 2020-09-03 ENCOUNTER — MYC MEDICAL ADVICE (OUTPATIENT)
Dept: FAMILY MEDICINE | Facility: CLINIC | Age: 60
End: 2020-09-03

## 2020-09-03 DIAGNOSIS — K21.9 GASTROESOPHAGEAL REFLUX DISEASE, ESOPHAGITIS PRESENCE NOT SPECIFIED: Primary | ICD-10-CM

## 2020-09-03 DIAGNOSIS — I10 ESSENTIAL HYPERTENSION: ICD-10-CM

## 2020-09-04 DIAGNOSIS — K21.9 GASTROESOPHAGEAL REFLUX DISEASE, ESOPHAGITIS PRESENCE NOT SPECIFIED: ICD-10-CM

## 2020-09-08 ENCOUNTER — NURSE TRIAGE (OUTPATIENT)
Dept: NURSING | Facility: CLINIC | Age: 60
End: 2020-09-08

## 2020-09-09 NOTE — TELEPHONE ENCOUNTER
"C/o \"burning esophagus\". Has GERD. Restarted Prilosec 4 days ago. No consumption of spicy, acidic or high fat food. Last ate at lunch - pasta w/ broccoli.  No SOB, feels well. Has not tried antacid. Advised to take otc antacid such as Maalox or Tums. No lying down. If antacid does not help then advise ED to r/o cardiac though \"burning\" quality sounds more like reflux. If unsure, call back to discuss.     Reason for Disposition    [1] Pain lasts > 10 minutes AND [2] age > 50    Additional Information    Negative: Severe difficulty breathing (e.g., struggling for each breath, speaks in single words)    Negative: Shock suspected (e.g., cold/pale/clammy skin, too weak to stand, low BP, rapid pulse)    Negative: Difficult to awaken or acting confused (e.g., disoriented, slurred speech)    Negative: Passed out (i.e., lost consciousness, collapsed and was not responding)    Negative: Visible sweat on face or sweat dripping down face    Negative: Sounds like a life-threatening emergency to the triager    Negative: Followed an abdomen (stomach) injury    Negative: Chest pain    Negative: [1] SEVERE pain (e.g., excruciating) AND [2] present > 1 hour    Protocols used: ABDOMINAL PAIN - UPPER-A-AH      "

## 2020-09-15 DIAGNOSIS — F51.04 PSYCHOPHYSIOLOGICAL INSOMNIA: ICD-10-CM

## 2020-09-15 DIAGNOSIS — F41.9 ANXIETY: ICD-10-CM

## 2020-09-15 RX ORDER — TEMAZEPAM 15 MG/1
CAPSULE ORAL
Qty: 60 CAPSULE | Refills: 5 | Status: SHIPPED | OUTPATIENT
Start: 2020-09-15 | End: 2021-03-31

## 2020-09-15 RX ORDER — LOSARTAN POTASSIUM 50 MG/1
50 TABLET ORAL DAILY
Qty: 90 TABLET | Refills: 3 | Status: SHIPPED | OUTPATIENT
Start: 2020-09-15 | End: 2021-08-30

## 2020-09-25 ENCOUNTER — ALLIED HEALTH/NURSE VISIT (OUTPATIENT)
Dept: FAMILY MEDICINE | Facility: CLINIC | Age: 60
End: 2020-09-25
Payer: COMMERCIAL

## 2020-09-25 DIAGNOSIS — Z23 NEED FOR VACCINATION: ICD-10-CM

## 2020-09-25 DIAGNOSIS — Z23 NEED FOR PROPHYLACTIC VACCINATION AND INOCULATION AGAINST INFLUENZA: ICD-10-CM

## 2020-09-25 PROCEDURE — 99207 ZZC NO CHARGE NURSE ONLY: CPT

## 2020-09-25 PROCEDURE — 90750 HZV VACC RECOMBINANT IM: CPT

## 2020-09-25 PROCEDURE — 90682 RIV4 VACC RECOMBINANT DNA IM: CPT

## 2020-09-25 PROCEDURE — 90471 IMMUNIZATION ADMIN: CPT

## 2020-09-25 PROCEDURE — 90472 IMMUNIZATION ADMIN EACH ADD: CPT

## 2020-09-25 NOTE — PROGRESS NOTES
Chief Complaint   Patient presents with     Imm/Inj     Flu Shot and shingrix shot, checked with insurance (covered within clinic).

## 2020-09-25 NOTE — NURSING NOTE
Prior to immunization administration, verified patients identity using patient s name and date of birth. Please see Immunization Activity for additional information.     Screening Questionnaire for Adult Immunization    Are you sick today?   No   Do you have allergies to medications, food, a vaccine component or latex?   No   Have you ever had a serious reaction after receiving a vaccination?   No   Do you have a long-term health problem with heart, lung, kidney, or metabolic disease (e.g., diabetes), asthma, a blood disorder, no spleen, complement component deficiency, a cochlear implant, or a spinal fluid leak?  Are you on long-term aspirin therapy?   No   Do you have cancer, leukemia, HIV/AIDS, or any other immune system problem?   No   Do you have a parent, brother, or sister with an immune system problem?   No   In the past 3 months, have you taken medications that affect  your immune system, such as prednisone, other steroids, or anticancer drugs; drugs for the treatment of rheumatoid arthritis, Crohn s disease, or psoriasis; or have you had radiation treatments?   No   Have you had a seizure, or a brain or other nervous system problem?   No   During the past year, have you received a transfusion of blood or blood    products, or been given immune (gamma) globulin or antiviral drug?   No   For women: Are you pregnant or is there a chance you could become       pregnant during the next month?   No   Have you received any vaccinations in the past 4 weeks?   No     Immunization questionnaire answers were all negative.        Per orders of Dr. grimm, injection of shingrix and flu given by Page Sorto CMA. Patient instructed to remain in clinic for 15 minutes afterwards, and to report any adverse reaction to me immediately.       Screening performed by Page Sorto CMA on 9/25/2020 at 1:59 PM.

## 2020-11-11 DIAGNOSIS — Z11.59 ENCOUNTER FOR SCREENING FOR OTHER VIRAL DISEASES: Primary | ICD-10-CM

## 2020-11-12 DIAGNOSIS — Z11.59 ENCOUNTER FOR SCREENING FOR OTHER VIRAL DISEASES: ICD-10-CM

## 2020-11-12 PROCEDURE — U0003 INFECTIOUS AGENT DETECTION BY NUCLEIC ACID (DNA OR RNA); SEVERE ACUTE RESPIRATORY SYNDROME CORONAVIRUS 2 (SARS-COV-2) (CORONAVIRUS DISEASE [COVID-19]), AMPLIFIED PROBE TECHNIQUE, MAKING USE OF HIGH THROUGHPUT TECHNOLOGIES AS DESCRIBED BY CMS-2020-01-R: HCPCS | Performed by: SURGERY

## 2020-11-12 RX ORDER — IBUPROFEN 600 MG/1
TABLET, FILM COATED ORAL
COMMUNITY
Start: 2020-06-12

## 2020-11-13 ENCOUNTER — ANESTHESIA EVENT (OUTPATIENT)
Dept: GASTROENTEROLOGY | Facility: CLINIC | Age: 60
End: 2020-11-13
Payer: COMMERCIAL

## 2020-11-13 LAB
SARS-COV-2 RNA SPEC QL NAA+PROBE: NOT DETECTED
SPECIMEN SOURCE: NORMAL

## 2020-11-13 NOTE — ANESTHESIA PREPROCEDURE EVALUATION
Anesthesia Pre-Procedure Evaluation    Patient: Stephanie Trejo   MRN: [id number] : 1960          Preoperative Diagnosis: Gastroesophageal reflux disease, unspecified whether esophagitis present [K21.9]    Procedure(s):  ESOPHAGOGASTRODUODENOSCOPY (EGD)    Past Medical History:   Diagnosis Date     CHF (congestive heart failure) (H) 2013     Depressive disorder      Hypertension      Mitral valve insufficiency, acquired      Other and unspecified disc disorder of lumbar region      Scoliosis (and kyphoscoliosis), idiopathic      Undiagnosed cardiac murmurs      Unspecified hypothyroidism      Past Surgical History:   Procedure Laterality Date     BACK SURGERY       COLONOSCOPY       COLONOSCOPY N/A 2020    Procedure: COLONOSCOPY;  Surgeon: Shaun Torrez MD;  Location: [facility]     GYN SURGERY       SOFT TISSUE SURGERY       SURGICAL HISTORY OF -            SURGICAL HISTORY OF -       Laparoscopy     SURGICAL HISTORY OF -       Hernia Repair      SURGICAL HISTORY OF -       Back Fusion T3 - L3       Anesthesia Evaluation     . Pt has had prior anesthetic.     No history of anesthetic complications          ROS/MED HX    ENT/Pulmonary:     (+)sleep apnea, , . .    Neurologic:       Cardiovascular:     (+) hypertension----. : . CHF . . :. valvular problems/murmurs type: MR .       METS/Exercise Tolerance:  >4 METS   Hematologic:  - neg hematologic  ROS       Musculoskeletal:   (+)  other musculoskeletal- disc disorder ; scoliosis, rods in back      GI/Hepatic: Comment: Increased risk of aspiration due to GERD    (+) GERD Symptomatic,       Renal/Genitourinary:  - ROS Renal section negative       Endo:     (+) thyroid problem hypothyroidism, .      Psychiatric:     (+) psychiatric history depression and anxiety      Infectious Disease:  - neg infectious disease ROS       Malignancy:      - no malignancy   Other:    - neg other ROS                      Physical Exam  Normal  "systems: cardiovascular, pulmonary and dental    Airway   Mallampati: II  TM distance: >3 FB  Neck ROM: full    Dental     Cardiovascular       Pulmonary             Lab Results   Component Value Date    WBC 6.9 04/21/2020    HGB 14.1 04/21/2020    HCT 42.9 04/21/2020     04/21/2020     05/19/2020    POTASSIUM 3.7 05/19/2020    CHLORIDE 105 05/19/2020    CO2 32 05/19/2020    BUN 25 05/19/2020    CR 0.97 05/19/2020    GLC 90 05/19/2020    GABBIE 8.9 05/19/2020    MAG 2.3 06/27/2011    ALBUMIN 3.7 04/21/2020    PROTTOTAL 7.3 04/21/2020    ALT 30 04/21/2020    AST 22 04/21/2020    ALKPHOS 128 04/21/2020    BILITOTAL 0.4 04/21/2020    INR 1.3 (A) 08/01/2011    TSH 0.60 04/21/2020    T4 1.34 01/13/2020       Preop Vitals  BP Readings from Last 3 Encounters:   04/21/20 (!) 148/79   02/18/20 (!) 145/79   01/13/20 (!) 160/88    Pulse Readings from Last 3 Encounters:   07/15/20 54   04/21/20 60   02/18/20 67      Resp Readings from Last 3 Encounters:   04/21/20 17   02/18/20 16   01/13/20 16    SpO2 Readings from Last 3 Encounters:   07/15/20 98%   04/21/20 95%   02/18/20 97%      Temp Readings from Last 1 Encounters:   04/21/20 36.8  C (98.3  F) (Oral)    Ht Readings from Last 1 Encounters:   07/15/20 1.803 m (5' 11\")      Wt Readings from Last 1 Encounters:   04/21/20 87.1 kg (192 lb)    Estimated body mass index is 26.78 kg/m  as calculated from the following:    Height as of 7/15/20: 1.803 m (5' 11\").    Weight as of 4/21/20: 87.1 kg (192 lb).       Anesthesia Plan      History & Physical Review  History and physical reviewed and following examination; no interval change.    ASA Status:  3 .    NPO Status:  > 6 hours    Plan for General with Propofol and Intravenous induction. Maintenance will be TIVA.    PONV prophylaxis:  Ondansetron (or other 5HT-3) and Dexamethasone or Solumedrol         Postoperative Care      Consents  Anesthetic plan, risks, benefits and alternatives discussed with:  Patient..           "       Irene Rock, KAY CRNA

## 2020-11-16 ENCOUNTER — HOSPITAL ENCOUNTER (OUTPATIENT)
Facility: CLINIC | Age: 60
Discharge: HOME | End: 2020-11-16
Attending: SURGERY | Admitting: SURGERY
Payer: COMMERCIAL

## 2020-11-16 ENCOUNTER — ANESTHESIA (OUTPATIENT)
Dept: GASTROENTEROLOGY | Facility: CLINIC | Age: 60
End: 2020-11-16
Payer: COMMERCIAL

## 2020-11-16 VITALS
OXYGEN SATURATION: 97 % | SYSTOLIC BLOOD PRESSURE: 131 MMHG | TEMPERATURE: 98 F | RESPIRATION RATE: 14 BRPM | HEIGHT: 64 IN | BODY MASS INDEX: 33.29 KG/M2 | WEIGHT: 195 LBS | DIASTOLIC BLOOD PRESSURE: 77 MMHG | HEART RATE: 55 BPM

## 2020-11-16 DIAGNOSIS — K21.9 GASTROESOPHAGEAL REFLUX DISEASE WITHOUT ESOPHAGITIS: Primary | ICD-10-CM

## 2020-11-16 LAB — UPPER GI ENDOSCOPY: NORMAL

## 2020-11-16 PROCEDURE — 43239 EGD BIOPSY SINGLE/MULTIPLE: CPT | Performed by: SURGERY

## 2020-11-16 PROCEDURE — 88305 TISSUE EXAM BY PATHOLOGIST: CPT | Mod: TC | Performed by: SURGERY

## 2020-11-16 PROCEDURE — 88305 TISSUE EXAM BY PATHOLOGIST: CPT | Mod: 26 | Performed by: PATHOLOGY

## 2020-11-16 PROCEDURE — 370N000001 HC ANESTHESIA TECHNICAL FEE, 1ST 30 MIN: Performed by: SURGERY

## 2020-11-16 PROCEDURE — 250N000009 HC RX 250: Performed by: SURGERY

## 2020-11-16 PROCEDURE — 250N000009 HC RX 250: Performed by: NURSE ANESTHETIST, CERTIFIED REGISTERED

## 2020-11-16 PROCEDURE — 258N000003 HC RX IP 258 OP 636: Performed by: SURGERY

## 2020-11-16 PROCEDURE — 250N000011 HC RX IP 250 OP 636: Performed by: NURSE ANESTHETIST, CERTIFIED REGISTERED

## 2020-11-16 RX ORDER — LIDOCAINE HYDROCHLORIDE 10 MG/ML
INJECTION, SOLUTION INFILTRATION; PERINEURAL PRN
Status: DISCONTINUED | OUTPATIENT
Start: 2020-11-16 | End: 2020-11-16

## 2020-11-16 RX ORDER — PROPOFOL 10 MG/ML
INJECTION, EMULSION INTRAVENOUS PRN
Status: DISCONTINUED | OUTPATIENT
Start: 2020-11-16 | End: 2020-11-16

## 2020-11-16 RX ORDER — PANTOPRAZOLE SODIUM 40 MG/1
40 TABLET, DELAYED RELEASE ORAL DAILY
Qty: 90 TABLET | Refills: 0 | Status: SHIPPED | OUTPATIENT
Start: 2020-11-16 | End: 2021-02-05

## 2020-11-16 RX ORDER — SODIUM CHLORIDE, SODIUM LACTATE, POTASSIUM CHLORIDE, CALCIUM CHLORIDE 600; 310; 30; 20 MG/100ML; MG/100ML; MG/100ML; MG/100ML
INJECTION, SOLUTION INTRAVENOUS CONTINUOUS
Status: DISCONTINUED | OUTPATIENT
Start: 2020-11-16 | End: 2020-11-16 | Stop reason: HOSPADM

## 2020-11-16 RX ORDER — LIDOCAINE 40 MG/G
CREAM TOPICAL
Status: DISCONTINUED | OUTPATIENT
Start: 2020-11-16 | End: 2020-11-16 | Stop reason: HOSPADM

## 2020-11-16 RX ORDER — GLYCOPYRROLATE 0.2 MG/ML
INJECTION, SOLUTION INTRAMUSCULAR; INTRAVENOUS PRN
Status: DISCONTINUED | OUTPATIENT
Start: 2020-11-16 | End: 2020-11-16

## 2020-11-16 RX ORDER — PROPOFOL 10 MG/ML
INJECTION, EMULSION INTRAVENOUS CONTINUOUS PRN
Status: DISCONTINUED | OUTPATIENT
Start: 2020-11-16 | End: 2020-11-16

## 2020-11-16 RX ADMIN — SODIUM CHLORIDE, POTASSIUM CHLORIDE, SODIUM LACTATE AND CALCIUM CHLORIDE 1000 ML: 600; 310; 30; 20 INJECTION, SOLUTION INTRAVENOUS at 13:47

## 2020-11-16 RX ADMIN — PROPOFOL 100 MG: 10 INJECTION, EMULSION INTRAVENOUS at 13:51

## 2020-11-16 RX ADMIN — GLYCOPYRROLATE 0.2 MG: 0.2 INJECTION, SOLUTION INTRAMUSCULAR; INTRAVENOUS at 13:50

## 2020-11-16 RX ADMIN — LIDOCAINE HYDROCHLORIDE 50 MG: 10 INJECTION, SOLUTION INFILTRATION; PERINEURAL at 13:51

## 2020-11-16 RX ADMIN — LIDOCAINE HYDROCHLORIDE 0.1 ML: 10 INJECTION, SOLUTION EPIDURAL; INFILTRATION; INTRACAUDAL; PERINEURAL at 13:47

## 2020-11-16 RX ADMIN — PROPOFOL 150 MCG/KG/MIN: 10 INJECTION, EMULSION INTRAVENOUS at 13:51

## 2020-11-16 ASSESSMENT — MIFFLIN-ST. JEOR: SCORE: 1429.51

## 2020-11-16 NOTE — PROGRESS NOTES
Dr. Kincaid called about pt continuing Omeprazole. Dr. Kincaid called to verify. Pt was changed to Protonix and she can start this tomorrow. Pharmacy was called to get instructions on how to take this bc she is also on Levothyroxine. Pharmacy suggested that she takes the thyroid medication like she normally dose and she can take the protonix one hour before lunch or dinner. Pt expressed understanding.

## 2020-11-16 NOTE — LETTER
Stephanie Trejo  [address] [facility] [address]  November 20, 2020    Dear Stephanie,   This letter is to inform you of the results of your pathology report on your upper endoscopy (EGD).    Your pathology report was:  SPECIMEN(S):   A: Duodenal biopsy   B: Antral biopsy   C: Gastroesophageal junction biopsy     FINAL DIAGNOSIS:   A. Duodenum, biopsy:   - Duodenal mucosa with no diagnostic alterations.     B. Stomach, biopsy:   - Fragments of gastric antral- and body-type mucosa with no diagnostic   alterations.   - No Helicobacter pylori identified.     C. Gastroesophageal junction biopsy:   - Stratified squamous mucosa with mild reactive changes, nonspecific.   - Cardia and cardia-oxyntic mucosa with mild chronic inflammation and   reactive changes   - No evidence of goblet cells, dysplasia or malignancy   Showed findings consistent with reflux (heartburn).    If you have any questions or concerns please do not hesitate to call my office at [phone].  Sincerely,     [facility],   [facility]

## 2020-11-16 NOTE — ANESTHESIA POSTPROCEDURE EVALUATION
Patient: Stephanie Trejo    Procedure(s):  ESOPHAGOGASTRODUODENOSCOPY, WITH BIOPSY    Diagnosis:Gastroesophageal reflux disease, unspecified whether esophagitis present [K21.9]  Diagnosis Additional Information: No value filed.    Anesthesia Type:  General    Note:  Anesthesia Post Evaluation    Patient location during evaluation: Bedside  Patient participation: Able to fully participate in evaluation  Level of consciousness: sleepy but conscious  Pain management: adequate  Airway patency: patent  Cardiovascular status: stable  Respiratory status: nasal cannula and room air  Hydration status: stable  PONV: none     Anesthetic complications: None          Last vitals:  Vitals:    11/16/20 1308   BP: (!) 144/83   Resp: 18   Temp: 36.7  C (98  F)   SpO2: 98%         Electronically Signed By: KAY Barahona CRNA  November 16, 2020  2:02 PM

## 2020-11-16 NOTE — ANESTHESIA CARE TRANSFER NOTE
Patient: Stephanie Trejo    Procedure(s):  ESOPHAGOGASTRODUODENOSCOPY, WITH BIOPSY    Diagnosis: Gastroesophageal reflux disease, unspecified whether esophagitis present [K21.9]  Diagnosis Additional Information: No value filed.    Anesthesia Type:   General     Note:  Airway :Nasal Cannula  Patient transferred to:Phase II        Vitals: (Last set prior to Anesthesia Care Transfer)    CRNA VITALS  11/16/2020 1331 - 11/16/2020 1401      11/16/2020             Pulse:  72    Ht Rate:  69    SpO2:  99 %                Electronically Signed By: KAY Barahona CRNA  November 16, 2020  2:01 PM

## 2020-11-16 NOTE — H&P
[facility]    Pre-Endoscopy History and Physical     Stephanie Trejo MRN# [id number]   YOB: 1960 Age: 60 year old     Date of Procedure: 2020  Primary care provider: Radha Felton  Type of Endoscopy: Esophagogastroduodenoscopy with possible biopsy, possible dilation, possible foreign body removal  Reason for Procedure: reflux  Type of Anesthesia Anticipated: MAC    HPI:    Stephanie is a 60 year old female who will be undergoing the above procedure.  hx reflux; never EGD; on PPI    A history and physical has been performed. The patient's medications and allergies have been reviewed. The risks and benefits of the procedure and the sedation options and risks were discussed with the patient.  All questions were answered and informed consent was obtained.      She denies a personal or family history of anesthesia complications or bleeding disorders.     Patient Active Problem List   Diagnosis     Hypothyroidism     Other and unspecified disc disorder of lumbar region     Essential hypertension     CAMELIA (obstructive sleep apnea)     CARDIOVASCULAR SCREENING; LDL GOAL LESS THAN 100     Mitral valve regurgitation     Anaplasmosis     Abnormal uterine bleeding     Mild major depression (H)     Counseling regarding advanced directives     Anxiety        Past Medical History:   Diagnosis Date     CHF (congestive heart failure) (H) 2013     Depressive disorder      Hypertension      Mitral valve insufficiency, acquired      Other and unspecified disc disorder of lumbar region      Scoliosis (and kyphoscoliosis), idiopathic      Undiagnosed cardiac murmurs      Unspecified hypothyroidism         Past Surgical History:   Procedure Laterality Date     BACK SURGERY       COLONOSCOPY       COLONOSCOPY N/A 2020    Procedure: COLONOSCOPY;  Surgeon: Shaun Torrez MD;  Location: [facility]     GYN SURGERY       SOFT TISSUE SURGERY       SURGICAL HISTORY OF -             SURGICAL HISTORY OF -       Laparoscopy     SURGICAL HISTORY OF -   1983    Hernia Repair      SURGICAL HISTORY OF -   01/98    Back Fusion T3 - L3       Social History     Tobacco Use     Smoking status: Never Smoker     Smokeless tobacco: Never Used   Substance Use Topics     Alcohol use: No     Alcohol/week: 0.0 standard drinks       Family History   Problem Relation Age of Onset     Lipids Mother      Thyroid Disease Mother      Eye Disorder Mother         macular degeneration     Colon Cancer Mother      Anesthesia Reaction Mother         allergic to some     Hypertension Father      Cardiovascular Father         MI     Osteoporosis Father      Alzheimer Disease Father      Genitourinary Problems Father         incontinence     Breast Cancer Maternal Grandmother      Cerebrovascular Disease Maternal Grandfather      Diabetes Paternal Grandmother      Gastrointestinal Disease Sister         colitis     Respiratory Daughter         asthma       Prior to Admission medications    Medication Sig Start Date End Date Taking? Authorizing Provider   aspirin 81 MG chewable tablet Take 1 tablet by mouth daily. 6/28/11  Yes Michele Ocasio MD   hydrochlorothiazide (HYDRODIURIL) 25 MG tablet TAKE 1 TABLET(25 MG) BY MOUTH DAILY 7/10/20  Yes Radha Felton MD   ibuprofen (ADVIL/MOTRIN) 600 MG tablet TK 1 T PO Q 6 H PRF PAIN 6/12/20  Yes Reported, Patient   levothyroxine (SYNTHROID/LEVOTHROID) 125 MCG tablet TAKE 1 TABLET(125 MCG) BY MOUTH DAILY  Patient taking differently: Take 125 mcg by mouth daily  1/13/20  Yes Radha Felton MD   losartan (COZAAR) 50 MG tablet Take 1 tablet (50 mg) by mouth daily 9/15/20  Yes Radha Felton MD   omeprazole (PRILOSEC) 20 MG DR capsule Take 1 capsule (20 mg) by mouth daily 11/9/20  Yes Radha Felton MD   temazepam (RESTORIL) 15 MG capsule TAKE 1 TO 2 CAPSULES BY MOUTH EVERY NIGHT AS NEEDED FOR SLEEP 9/15/20  Yes Radha Felton MD       Allergies  "  Allergen Reactions     Amlodipine Besylate Swelling     Swelling in ankles, Racing pulse also     Ammonia Hives and Swelling     Lisinopril      Dizziness and headaches     Penicillins Rash        REVIEW OF SYSTEMS:   5 point ROS negative except as noted above in HPI, including Gen., Resp., CV, GI &  system review.    PHYSICAL EXAM:   BP (!) 144/83   Temp 98  F (36.7  C) (Oral)   Resp 18   Ht 1.626 m (5' 4\")   Wt 88.5 kg (195 lb)   LMP 09/21/2012   SpO2 98%   BMI 33.47 kg/m   Estimated body mass index is 33.47 kg/m  as calculated from the following:    Height as of this encounter: 1.626 m (5' 4\").    Weight as of this encounter: 88.5 kg (195 lb).   Constitutional: Awake, alert, no acute distress.  Eyes: No scleral icterus.  Conjunctiva are without injection.  ENMT: Mucous membranes moist, dentition and gums are intact.   Neck: Soft, supple, trachea midline.    Endocrine: n/a   Lymphatic: There is no cervical, submandibularadenopathy.  Respiratory: Lungs are clear to auscultation and percussion bilaterally.   Cardiovascular: Regular rate and rhythm. No murmurs, rubs, or gallops.    Abdomen: Non-distended, non-tender, normoactive bowel sounds present, No masses,  Musculoskeletal: No spinal or CVA tenderness. Full range of motion in the upper and lower extremities.    Skin: No skin rashes or lesions to inspection.  No petechia.    Neurologic: Cranial nerves II through XII are grossly intact and symmetric.  Psychiatric: The patient is alert and oriented times 3.  The patient's affect is not blunted and mood is appropriate.  DIAGNOSTICS:    Not indicated    IMPRESSION   ASA Class 2 - Mild systemic disease    PLAN:   Plan for Esophagogastroduodenoscopy with possible biopsy, possible dilation, possible foreign body removal. We discussed the risks, benefits and alternatives and the patient wished to proceed.  Patient is cleared for the above procedure.    The above has been forwarded to the consulting " provider.    [facility] Kincaid, [facility]

## 2020-11-18 LAB — COPATH REPORT: NORMAL

## 2021-02-05 ENCOUNTER — MYC REFILL (OUTPATIENT)
Dept: FAMILY MEDICINE | Facility: CLINIC | Age: 61
End: 2021-02-05

## 2021-02-05 DIAGNOSIS — K21.9 GASTROESOPHAGEAL REFLUX DISEASE WITHOUT ESOPHAGITIS: ICD-10-CM

## 2021-02-05 RX ORDER — PANTOPRAZOLE SODIUM 40 MG/1
40 TABLET, DELAYED RELEASE ORAL DAILY
Qty: 90 TABLET | Refills: 0 | Status: SHIPPED | OUTPATIENT
Start: 2021-02-05 | End: 2021-03-31

## 2021-02-24 ENCOUNTER — E-VISIT (OUTPATIENT)
Dept: FAMILY MEDICINE | Facility: CLINIC | Age: 61
End: 2021-02-24
Payer: COMMERCIAL

## 2021-02-24 DIAGNOSIS — B96.89 ACUTE BACTERIAL SINUSITIS: Primary | ICD-10-CM

## 2021-02-24 DIAGNOSIS — J01.90 ACUTE BACTERIAL SINUSITIS: Primary | ICD-10-CM

## 2021-02-24 PROCEDURE — 99421 OL DIG E/M SVC 5-10 MIN: CPT | Performed by: FAMILY MEDICINE

## 2021-02-24 RX ORDER — DOXYCYCLINE HYCLATE 100 MG
100 TABLET ORAL 2 TIMES DAILY
Qty: 14 TABLET | Refills: 0 | Status: SHIPPED | OUTPATIENT
Start: 2021-02-24 | End: 2021-03-03

## 2021-02-24 NOTE — PATIENT INSTRUCTIONS
Dear Stephanie Trejo    After reviewing your responses, I've been able to diagnose you with?a sinus infection caused by bacteria.?     Based on your responses and diagnosis, I have prescribed doxycycline   to treat your symptoms. I have sent this to your pharmacy.?     It is also important to stay well hydrated, get lots of rest and take over-the-counter decongestants,?tylenol?or ibuprofen if you?are able to?take those medications per your primary care provider to help relieve discomfort.?     It is important that you take?all of?your prescribed medication even if your symptoms are improving after a few doses.? Taking?all of?your medicine helps prevent the symptoms from returning.?     If your symptoms worsen, you develop severe headache, vomiting, high fever (>102), or are not improving in 7 days, please contact your primary care provider for an appointment or visit any of our convenient Walk-in Care or Urgent Care Centers to be seen which can be found on our website?here.?     Thanks again for choosing?us?as your health care partner,?   ?  Radha Felton MD?     Sinusitis (Antibiotic Treatment)    The sinuses are air-filled spaces within the bones of the face. They connect to the inside of the nose. Sinusitis is an inflammation of the tissue that lines the sinuses. Sinusitis can occur during a cold. It can also happen due to allergies to pollens and other particles in the air. Sinusitis can cause symptoms of sinus congestion and a feeling of fullness. A sinus infection causes fever, headache, and facial pain. There is often green or yellow fluid draining from the nose or into the back of the throat (post-nasal drip). You have been given antibiotics to treat this condition.   Home care    Take the full course of antibiotics as instructed. Don't stop taking them, even when you feel better.    Drink plenty of water, hot tea, and other liquids as directed by the healthcare provider. This may help thin nasal  mucus. It also may help your sinuses drain fluids.    Heat may help soothe painful areas of your face. Use a towel soaked in hot water. Or,  the shower and direct the warm spray onto your face. Using a vaporizer along with a menthol rub at night may also help soothe symptoms.     An expectorant with guaifenesin may help thin nasal mucus and help your sinuses drain fluids. Talk with your provider or pharmacists before taking an over-the-counter (OTC) medicine if you have any questions about it or its side effects..    You can use an OTC decongestant, unless a similar medicine was prescribed to you. Nasal sprays work the fastest. Use one that contains phenylephrine or oxymetazoline. First blow your nose gently. Then use the spray. Don't use these medicines more often than directed on the label. If you do, your symptoms may get worse. You may also take pills that contain pseudoephedrine. Don t use products that combine multiple medicines. This is because side effects may be increased. Read labels. You can also ask the pharmacist for help. (People with high blood pressure should not use decongestants. They can raise blood pressure.) Talk with your provider or pharmacist if you have any questions about the medicine..    OTC antihistamines may help if allergies contributed to your sinusitis. Talk with your provider or pharmacist if you have any questions about the medicine..    Don't use nasal rinses or irrigation during an acute sinus infection, unless your healthcare provider tells you to. Rinsing may spread the infection to other areas in your sinuses.    Use acetaminophen or ibuprofen to control pain, unless another pain medicine was prescribed to you. If you have chronic liver or kidney disease or ever had a stomach ulcer, talk with your healthcare provider before using these medicines. Never give aspirin to anyone under age 18 who is ill with a fever. It may cause severe liver damage.    Don't smoke. This  can make symptoms worse.    Follow-up care  Follow up with your healthcare provider, or as advised.   When to seek medical advice  Call your healthcare provider if any of these occur:     Facial pain or headache that gets worse    Stiff neck    Unusual drowsiness or confusion    Swelling of your forehead or eyelids    Symptoms don't go away in 10 days    Vision problems, such as blurred or double vision    Fever of 100.4 F (38 C) or higher, or as directed by your healthcare provider  Call 911  Call 911 if any of these occur:     Seizure    Trouble breathing    Feeling dizzy or faint    Fingernails, skin or lips look blue, purple , or gray  Prevention  Here are steps you can take to help prevent an infection:     Keep good hand washing habits.    Don t have close contact with people who have sore throats, colds, or other upper respiratory infections.    Don t smoke, and stay away from secondhand smoke.    Stay up to date with of your vaccines.  [employer] last reviewed this educational content on 12/1/2019 2000-2020 [employer]. [address] [place] [address], [place] [address]. All rights reserved. This information is not intended as a substitute for professional medical care. Always follow your healthcare professional's instructions.

## 2021-03-04 DIAGNOSIS — E03.9 HYPOTHYROIDISM, UNSPECIFIED TYPE: ICD-10-CM

## 2021-03-04 RX ORDER — LEVOTHYROXINE SODIUM 125 UG/1
TABLET ORAL
Qty: 90 TABLET | Refills: 0 | Status: SHIPPED | OUTPATIENT
Start: 2021-03-04 | End: 2021-03-31

## 2021-03-05 ENCOUNTER — OFFICE VISIT (OUTPATIENT)
Dept: FAMILY MEDICINE | Facility: CLINIC | Age: 61
End: 2021-03-05
Payer: COMMERCIAL

## 2021-03-05 VITALS
TEMPERATURE: 98 F | SYSTOLIC BLOOD PRESSURE: 126 MMHG | OXYGEN SATURATION: 98 % | HEART RATE: 68 BPM | WEIGHT: 196 LBS | DIASTOLIC BLOOD PRESSURE: 72 MMHG | HEIGHT: 71 IN | RESPIRATION RATE: 16 BRPM | BODY MASS INDEX: 27.44 KG/M2

## 2021-03-05 DIAGNOSIS — H61.21 IMPACTED CERUMEN OF RIGHT EAR: Primary | ICD-10-CM

## 2021-03-05 PROCEDURE — 69209 REMOVE IMPACTED EAR WAX UNI: CPT | Mod: RT | Performed by: NURSE PRACTITIONER

## 2021-03-05 ASSESSMENT — ANXIETY QUESTIONNAIRES
6. BECOMING EASILY ANNOYED OR IRRITABLE: NOT AT ALL
IF YOU CHECKED OFF ANY PROBLEMS ON THIS QUESTIONNAIRE, HOW DIFFICULT HAVE THESE PROBLEMS MADE IT FOR YOU TO DO YOUR WORK, TAKE CARE OF THINGS AT HOME, OR GET ALONG WITH OTHER PEOPLE: NOT DIFFICULT AT ALL
1. FEELING NERVOUS, ANXIOUS, OR ON EDGE: NOT AT ALL
5. BEING SO RESTLESS THAT IT IS HARD TO SIT STILL: NOT AT ALL
2. NOT BEING ABLE TO STOP OR CONTROL WORRYING: NOT AT ALL
GAD7 TOTAL SCORE: 0
7. FEELING AFRAID AS IF SOMETHING AWFUL MIGHT HAPPEN: NOT AT ALL
3. WORRYING TOO MUCH ABOUT DIFFERENT THINGS: NOT AT ALL

## 2021-03-05 ASSESSMENT — PATIENT HEALTH QUESTIONNAIRE - PHQ9
SUM OF ALL RESPONSES TO PHQ QUESTIONS 1-9: 0
5. POOR APPETITE OR OVEREATING: NOT AT ALL

## 2021-03-05 ASSESSMENT — MIFFLIN-ST. JEOR: SCORE: 1545.18

## 2021-03-05 NOTE — PATIENT INSTRUCTIONS
Patient Education     Impacted Earwax     Inner ear structures including ear canal and eardrum.   Impacted earwax is a buildup of the natural wax in the ear. Impacted earwax is very common. It can cause symptoms such as hearing loss. It can also make it hard for a healthcare provider to check your ear.   Understanding earwax  Tiny glands in your ear make substances that combine with dead skin cells to form earwax. Earwax helps protect your ear canal from water, dirt, infection, and injury. Over time, earwax travels from the inner part of your ear canal to the entrance of the canal. Then it falls away naturally. But in some cases, it can t travel to the entrance of the canal. This may be because of a health condition or objects put in the ear. With age, earwax tends to become harder and less fluid. Older adults are more likely to have problems with earwax buildup.   What causes impacted earwax?  Earwax can build up because of many health conditions. Some cause a physical blockage. Others cause too much earwax to be made. Health conditions that can cause earwax buildup include:     Bony blockage in the ear (osteoma or exostoses)    Infections, such as an outer ear infection (external otitis)    Skin disease, such as eczema    Autoimmune diseases, such as lupus    A narrowed ear canal from birth, chronic inflammation, or injury    Too much earwax because of injury    Too much earwax because of  water in the ear canal  Putting objects in the ear again and again can also cause impacted earwax. For example, putting cotton swabs in the ear may push the wax deeper into the ear. Over time, this may cause blockage. Hearing aids, swimming plugs, and swim molds can also cause this problem when used again and again.   In some cases, the cause of impacted earwax is not known.  Symptoms of impacted earwax  Excess earwax often does not cause any symptoms, unless there is a large amount of buildup. Then it may cause symptoms such  as:     Hearing loss    Earache    Sense of ear fullness    Itching in the ear    Odor from the ear    Ear drainage    Dizziness    Ringing in the ears    Cough  Treatment for impacted earwax  If you don t have symptoms, you may not need treatment. Often the earwax goes away on its own with time. If you have symptoms, you may have 1 or more treatments such as:     Ear drops to soften the earwax. This helps it leave the ear over time.    Rinsing the ear canal with water. This is done in a healthcare provider s office.    Removing the earwax with small tools. This is also done in a provider s office.  In rare cases, some treatments for earwax removal may cause complications such as:    Outer ear infection    Earache    Short-term hearing loss    Dizziness    Water trapped in the ear canal    Hole in the eardrum    Ringing in the ears    Bleeding from the ear  Talk with your healthcare provider about which risks apply most to you.  Healthcare providers don't advise using ear candles or ear vacuum kits. These methods are not shown to work and may cause problems.   Preventing impacted earwax  You may not be able to prevent impacted earwax if you have a health condition that causes it, such as eczema. In other cases, you may be able to prevent earwax buildup by:     Using ear drops once a week    Having a regular ear cleaning about every 6 months    Not using cotton swabs in the ear  When to call the healthcare provider  Call your healthcare provider right away if you have:     Symptoms of impacted earwax    Severe symptoms after earwax removal, such as bleeding or severe ear pain    [employer] last reviewed this educational content on 9/1/2019 2000-2020 [employer]. All rights reserved. This information is not intended as a substitute for professional medical care. Always follow your healthcare professional's instructions.

## 2021-03-05 NOTE — PROGRESS NOTES
"Assessment & Plan     Impacted cerumen of right ear  Patient has impacted ear wax in right ear and some wax build up in left ear but I am able to see some of the TM.  Ear wash was conducted by MA today in clinic.  Evaluation after ear wash shows no irritation or redness and TM intact with improved symptoms.  Handout given and reviewed on home treatment and information about ear wax.  Follow-up as needed if any recurrence.  - MN REMOVAL IMPACTED CERUMEN IRRIGATION/LVG UNILAT    See Patient Instructions    Return if symptoms worsen or fail to improve.    Meredith Houston NP  [facility]    Estrada Brown is a 60 year old who presents for the following health issues     HPI       Acute Illness  Acute illness concerns: Right Plugged Ear   Onset/Duration: ongoing for over 1 month   Symptoms:  Fever: no  Chills/Sweats: no  Headache (location?): no  Sinus Pressure: no  Conjunctivitis:  no  Ear Pain: YES: right, achy  Rhinorrhea: YES- very little and clear  Congestion: YES- a little  Sore Throat: no  Cough: no  Wheeze: no  Decreased Appetite: no  Nausea: no  Vomiting: no  Diarrhea: no  Dysuria/Freq.: no  Dysuria or Hematuria: no  Fatigue/Achiness: no  Sick/Strep Exposure: no  Therapies tried and outcome: antihistamine, doxycycline, ear drops, sudafed have not worked   **  Had 2nd covid vaccine on 2/12/2021 and 2/14/2021 her Right ear plugged    Muffled hearing in the right ear, and feels like it is \"sucking in\".  Started 2 days after her second dose of COVID 19 vaccination. If she pop's the ear it will open and then close back up.    Review of Systems   CONSTITUTIONAL: NEGATIVE for fever, chills, change in weight  ENT/MOUTH: POSITIVE for right ear hearing loss, pressure, but denies pain in the ear  RESP: NEGATIVE for significant cough or SOB  CV: NEGATIVE for chest pain, palpitations or peripheral edema  PSYCHIATRIC: NEGATIVE for changes in mood or affect  ROS otherwise negative    " "  Objective    /72   Pulse 68   Temp 98  F (36.7  C) (Tympanic)   Resp 16   Ht 1.803 m (5' 11\")   Wt 88.9 kg (196 lb)   LMP 09/21/2012   SpO2 98%   BMI 27.34 kg/m    Body mass index is 27.34 kg/m .  Physical Exam   GENERAL: healthy, alert and no distress  HENT: right ear: occluded with wax and left ear: has moderate amount of wax without occusion  PSYCH: mentation appears normal, affect normal/bright         "

## 2021-03-06 ASSESSMENT — ANXIETY QUESTIONNAIRES: GAD7 TOTAL SCORE: 0

## 2021-03-21 ENCOUNTER — HEALTH MAINTENANCE LETTER (OUTPATIENT)
Age: 61
End: 2021-03-21

## 2021-03-28 ASSESSMENT — ENCOUNTER SYMPTOMS
DIARRHEA: 0
BREAST MASS: 0
PARESTHESIAS: 0
NAUSEA: 0
HEMATOCHEZIA: 0
FREQUENCY: 0
PALPITATIONS: 0
CONSTIPATION: 0
NERVOUS/ANXIOUS: 0
EYE PAIN: 0
SORE THROAT: 0
SHORTNESS OF BREATH: 0
MYALGIAS: 0
FEVER: 0
ABDOMINAL PAIN: 0
DYSURIA: 0
CHILLS: 0
COUGH: 0
DIZZINESS: 0
HEMATURIA: 0
WEAKNESS: 0
HEARTBURN: 0
JOINT SWELLING: 0
ARTHRALGIAS: 0
HEADACHES: 0

## 2021-03-31 ENCOUNTER — OFFICE VISIT (OUTPATIENT)
Dept: FAMILY MEDICINE | Facility: CLINIC | Age: 61
End: 2021-03-31
Payer: COMMERCIAL

## 2021-03-31 VITALS
SYSTOLIC BLOOD PRESSURE: 108 MMHG | HEART RATE: 60 BPM | TEMPERATURE: 96.5 F | BODY MASS INDEX: 27.06 KG/M2 | DIASTOLIC BLOOD PRESSURE: 74 MMHG | RESPIRATION RATE: 12 BRPM | WEIGHT: 194 LBS

## 2021-03-31 DIAGNOSIS — F32.0 MILD MAJOR DEPRESSION (H): ICD-10-CM

## 2021-03-31 DIAGNOSIS — Z12.4 SCREENING FOR MALIGNANT NEOPLASM OF CERVIX: ICD-10-CM

## 2021-03-31 DIAGNOSIS — F41.9 ANXIETY: ICD-10-CM

## 2021-03-31 DIAGNOSIS — R09.81 NASAL CONGESTION: ICD-10-CM

## 2021-03-31 DIAGNOSIS — E03.9 HYPOTHYROIDISM, UNSPECIFIED TYPE: ICD-10-CM

## 2021-03-31 DIAGNOSIS — K21.9 GASTROESOPHAGEAL REFLUX DISEASE WITHOUT ESOPHAGITIS: ICD-10-CM

## 2021-03-31 DIAGNOSIS — F51.04 PSYCHOPHYSIOLOGICAL INSOMNIA: ICD-10-CM

## 2021-03-31 DIAGNOSIS — I10 ESSENTIAL HYPERTENSION: ICD-10-CM

## 2021-03-31 DIAGNOSIS — Z00.00 ROUTINE GENERAL MEDICAL EXAMINATION AT A HEALTH CARE FACILITY: Primary | ICD-10-CM

## 2021-03-31 LAB
ALBUMIN SERPL-MCNC: 3.9 G/DL (ref 3.4–5)
ALP SERPL-CCNC: 106 U/L (ref 40–150)
ALT SERPL W P-5'-P-CCNC: 29 U/L (ref 0–50)
ANION GAP SERPL CALCULATED.3IONS-SCNC: 3 MMOL/L (ref 3–14)
AST SERPL W P-5'-P-CCNC: 25 U/L (ref 0–45)
BASOPHILS # BLD AUTO: 0 10E9/L (ref 0–0.2)
BASOPHILS NFR BLD AUTO: 0.6 %
BILIRUB SERPL-MCNC: 0.7 MG/DL (ref 0.2–1.3)
BUN SERPL-MCNC: 21 MG/DL (ref 7–30)
CALCIUM SERPL-MCNC: 9.3 MG/DL (ref 8.5–10.1)
CHLORIDE SERPL-SCNC: 102 MMOL/L (ref 94–109)
CHOLEST SERPL-MCNC: 220 MG/DL
CO2 SERPL-SCNC: 33 MMOL/L (ref 20–32)
CREAT SERPL-MCNC: 1.02 MG/DL (ref 0.52–1.04)
DIFFERENTIAL METHOD BLD: NORMAL
EOSINOPHIL # BLD AUTO: 0.1 10E9/L (ref 0–0.7)
EOSINOPHIL NFR BLD AUTO: 1.3 %
ERYTHROCYTE [DISTWIDTH] IN BLOOD BY AUTOMATED COUNT: 13.5 % (ref 10–15)
GFR SERPL CREATININE-BSD FRML MDRD: 59 ML/MIN/{1.73_M2}
GLUCOSE SERPL-MCNC: 92 MG/DL (ref 70–99)
HCT VFR BLD AUTO: 42.6 % (ref 35–47)
HDLC SERPL-MCNC: 70 MG/DL
HGB BLD-MCNC: 14 G/DL (ref 11.7–15.7)
LDLC SERPL CALC-MCNC: 129 MG/DL
LYMPHOCYTES # BLD AUTO: 1.6 10E9/L (ref 0.8–5.3)
LYMPHOCYTES NFR BLD AUTO: 29.5 %
MCH RBC QN AUTO: 30.2 PG (ref 26.5–33)
MCHC RBC AUTO-ENTMCNC: 32.9 G/DL (ref 31.5–36.5)
MCV RBC AUTO: 92 FL (ref 78–100)
MONOCYTES # BLD AUTO: 0.6 10E9/L (ref 0–1.3)
MONOCYTES NFR BLD AUTO: 10.3 %
NEUTROPHILS # BLD AUTO: 3.1 10E9/L (ref 1.6–8.3)
NEUTROPHILS NFR BLD AUTO: 58.3 %
NONHDLC SERPL-MCNC: 150 MG/DL
PLATELET # BLD AUTO: 196 10E9/L (ref 150–450)
POTASSIUM SERPL-SCNC: 3.8 MMOL/L (ref 3.4–5.3)
PROT SERPL-MCNC: 7.7 G/DL (ref 6.8–8.8)
RBC # BLD AUTO: 4.64 10E12/L (ref 3.8–5.2)
SODIUM SERPL-SCNC: 138 MMOL/L (ref 133–144)
TRIGL SERPL-MCNC: 104 MG/DL
TSH SERPL DL<=0.005 MIU/L-ACNC: 0.41 MU/L (ref 0.4–4)
WBC # BLD AUTO: 5.3 10E9/L (ref 4–11)

## 2021-03-31 PROCEDURE — 36415 COLL VENOUS BLD VENIPUNCTURE: CPT | Performed by: FAMILY MEDICINE

## 2021-03-31 PROCEDURE — 87624 HPV HI-RISK TYP POOLED RSLT: CPT | Performed by: FAMILY MEDICINE

## 2021-03-31 PROCEDURE — 80050 GENERAL HEALTH PANEL: CPT | Performed by: FAMILY MEDICINE

## 2021-03-31 PROCEDURE — 99214 OFFICE O/P EST MOD 30 MIN: CPT | Mod: 25 | Performed by: FAMILY MEDICINE

## 2021-03-31 PROCEDURE — 99396 PREV VISIT EST AGE 40-64: CPT | Performed by: FAMILY MEDICINE

## 2021-03-31 PROCEDURE — G0145 SCR C/V CYTO,THINLAYER,RESCR: HCPCS | Performed by: FAMILY MEDICINE

## 2021-03-31 PROCEDURE — 80061 LIPID PANEL: CPT | Performed by: FAMILY MEDICINE

## 2021-03-31 RX ORDER — LEVOTHYROXINE SODIUM 125 UG/1
TABLET ORAL
Qty: 90 TABLET | Refills: 3 | Status: SHIPPED | OUTPATIENT
Start: 2021-03-31 | End: 2021-06-01

## 2021-03-31 RX ORDER — FLUTICASONE PROPIONATE 50 MCG
1 SPRAY, SUSPENSION (ML) NASAL DAILY
Qty: 16 G | Refills: 1 | Status: SHIPPED | OUTPATIENT
Start: 2021-03-31 | End: 2022-06-24

## 2021-03-31 RX ORDER — HYDROCHLOROTHIAZIDE 25 MG/1
25 TABLET ORAL DAILY
Qty: 90 TABLET | Refills: 3 | Status: SHIPPED | OUTPATIENT
Start: 2021-03-31 | End: 2022-02-23

## 2021-03-31 RX ORDER — PANTOPRAZOLE SODIUM 40 MG/1
40 TABLET, DELAYED RELEASE ORAL DAILY
Qty: 90 TABLET | Refills: 3 | Status: SHIPPED | OUTPATIENT
Start: 2021-03-31 | End: 2022-05-02

## 2021-03-31 RX ORDER — TEMAZEPAM 15 MG/1
CAPSULE ORAL
Qty: 60 CAPSULE | Refills: 5 | Status: SHIPPED | OUTPATIENT
Start: 2021-03-31 | End: 2021-05-10

## 2021-03-31 ASSESSMENT — ENCOUNTER SYMPTOMS
DIZZINESS: 0
DYSURIA: 0
JOINT SWELLING: 0
PARESTHESIAS: 0
ABDOMINAL PAIN: 0
HEMATURIA: 0
HEADACHES: 0
EYE PAIN: 0
DIARRHEA: 0
WEAKNESS: 0
BREAST MASS: 0
MYALGIAS: 0
CONSTIPATION: 0
PALPITATIONS: 0
HEARTBURN: 0
SORE THROAT: 0
COUGH: 0
CHILLS: 0
SHORTNESS OF BREATH: 0
HEMATOCHEZIA: 0
NAUSEA: 0
FREQUENCY: 0
ARTHRALGIAS: 0
FEVER: 0
NERVOUS/ANXIOUS: 0

## 2021-03-31 ASSESSMENT — PAIN SCALES - GENERAL: PAINLEVEL: NO PAIN (0)

## 2021-03-31 NOTE — NURSING NOTE
"Chief Complaint   Patient presents with     Physical     /74   Pulse 60   Temp 96.5  F (35.8  C) (Tympanic)   Resp 12   Wt 88 kg (194 lb)   LMP 09/21/2012   BMI 27.06 kg/m   Estimated body mass index is 27.06 kg/m  as calculated from the following:    Height as of 3/5/21: 1.803 m (5' 11\").    Weight as of this encounter: 88 kg (194 lb).  Patient presents to the clinic using No DME      Health Maintenance that is potentially due pending provider review:    Health Maintenance Due   Topic Date Due     HIV SCREENING  Never done     HPV TEST  11/29/2019     PAP  11/29/2019     PREVENTIVE CARE VISIT  01/13/2021     TSH W/FREE T4 REFLEX  04/21/2021        Possibly completing today per provider review.        "

## 2021-03-31 NOTE — PROGRESS NOTES
SUBJECTIVE:   CC: Stephanie Trejo is an 60 year old woman who presents for preventive health visit.       Patient has been advised of split billing requirements and indicates understanding: Yes  Healthy Habits:     Getting at least 3 servings of Calcium per day:  NO    Bi-annual eye exam:  Yes    Dental care twice a year:  Yes    Sleep apnea or symptoms of sleep apnea:  Excessive snoring    Diet:  Regular (no restrictions)    Frequency of exercise:  1 day/week    Duration of exercise:  15-30 minutes    Taking medications regularly:  Yes    Medication side effects:  Not applicable    PHQ-2 Total Score: 0        Nasal congestion   Feels plugged in her nose   No discharge and does not feel sick   No fevers no runny nose   No itchy eyes or headaches just a pressure and plugged feeling in the nose       Hypertension Follow-up      Do you check your blood pressure regularly outside of the clinic? No     Are you following a low salt diet? Yes    Are your blood pressures ever more than 140 on the top number (systolic) OR more   than 90 on the bottom number (diastolic), for example 140/90? unsure    Depression and Anxiety Follow-Up    How are you doing with your depression since your last visit? No change    How are you doing with your anxiety since your last visit?  No change    Are you having other symptoms that might be associated with depression or anxiety? No    Have you had a significant life event? No     Do you have any concerns with your use of alcohol or other drugs? No    Social History     Tobacco Use     Smoking status: Never Smoker     Smokeless tobacco: Never Used   Substance Use Topics     Alcohol use: No     Alcohol/week: 0.0 standard drinks     Drug use: No     PHQ 10/2/2019 1/11/2020 3/5/2021   PHQ-9 Total Score 10 0 0   Q9: Thoughts of better off dead/self-harm past 2 weeks Not at all Not at all Not at all     SHERON-7 SCORE 12/4/2018 1/11/2020 3/5/2021   Total Score - - -   Total Score 1 (minimal  anxiety) 0 (minimal anxiety) -   Total Score 1 0 0         Suicide Assessment Five-step Evaluation and Treatment (SAFE-T)    Hypothyroidism Follow-up      Since last visit, patient describes the following symptoms: Weight stable, no hair loss, no skin changes, no constipation, no loose stools      Today's PHQ-2 Score:   PHQ-2 ( 1999 [employer]) 3/28/2021   Q1: Little interest or pleasure in doing things 0   Q2: Feeling down, depressed or hopeless 0   PHQ-2 Score 0   Q1: Little interest or pleasure in doing things Not at all   Q2: Feeling down, depressed or hopeless Not at all   PHQ-2 Score 0       Abuse: Current or Past (Physical, Sexual or Emotional) - No  Do you feel safe in your environment? Yes        Social History     Tobacco Use     Smoking status: Never Smoker     Smokeless tobacco: Never Used   Substance Use Topics     Alcohol use: No     Alcohol/week: 0.0 standard drinks     If you drink alcohol do you typically have >3 drinks per day or >7 drinks per week? No    Alcohol Use 3/31/2021   Prescreen: >3 drinks/day or >7 drinks/week? -   Prescreen: >3 drinks/day or >7 drinks/week? No       Reviewed orders with patient.  Reviewed health maintenance and updated orders accordingly - Yes  Labs reviewed in EPIC    Breast Cancer Screening:    FSH-7:   Breast CA Risk Assessment (FHS-7) 3/28/2021   Did any of your first-degree relatives have breast or ovarian cancer? No   Did any of your relatives have bilateral breast cancer? No   Did any man in your family have breast cancer? No   Did any woman in your family have breast and ovarian cancer? Yes   Do you have 2 or more relatives with breast and/or ovarian cancer? No   Do you have 2 or more relatives with breast and/or bowel cancer? No       Mammogram Screening: Recommended mammography every 1-2 years with patient discussion and risk factor consideration  Pertinent mammograms are reviewed under the imaging tab.    History of abnormal Pap smear: NO - age 30-65 PAP every 5  years with negative HPV co-testing recommended  PAP / HPV Latest Ref Rng & Units 11/29/2016 10/31/2013 10/18/2012   PAP - NIL NIL NIL   HPV 16 DNA NEG Negative - -   HPV 18 DNA NEG Negative - -   OTHER HR HPV NEG Negative - -     Reviewed and updated as needed this visit by clinical staff  Tobacco  Allergies  Meds  Problems  Med Hx  Surg Hx  Fam Hx          Reviewed and updated as needed this visit by Provider  Tobacco  Allergies  Meds  Problems  Med Hx  Surg Hx  Fam Hx             Review of Systems   Constitutional: Negative for chills and fever.   HENT: Positive for congestion. Negative for ear pain, hearing loss and sore throat.    Eyes: Negative for pain and visual disturbance.   Respiratory: Negative for cough and shortness of breath.    Cardiovascular: Negative for chest pain, palpitations and peripheral edema.   Gastrointestinal: Negative for abdominal pain, constipation, diarrhea, heartburn, hematochezia and nausea.   Breasts:  Negative for tenderness, breast mass and discharge.   Genitourinary: Negative for dysuria, frequency, genital sores, hematuria, pelvic pain, urgency, vaginal bleeding and vaginal discharge.   Musculoskeletal: Negative for arthralgias, joint swelling and myalgias.   Skin: Negative for rash.   Neurological: Negative for dizziness, weakness, headaches and paresthesias.   Psychiatric/Behavioral: Negative for mood changes. The patient is not nervous/anxious.           OBJECTIVE:   /74   Pulse 60   Temp 96.5  F (35.8  C) (Tympanic)   Resp 12   Wt 88 kg (194 lb)   LMP 09/21/2012   BMI 27.06 kg/m    Physical Exam  GENERAL APPEARANCE: healthy, alert and no distress  EYES: Eyes grossly normal to inspection, PERRL and conjunctivae and sclerae normal  HENT: ear canals and TM's normal, nose and mouth without ulcers or lesions, oropharynx clear and oral mucous membranes moist  NECK: no adenopathy, no asymmetry, masses, or scars and thyroid normal to palpation  RESP: lungs  clear to auscultation - no rales, rhonchi or wheezes  BREAST: normal without masses, tenderness or nipple discharge and no palpable axillary masses or adenopathy  CV: regular rate and rhythm, normal S1 S2, no S3 or S4, no murmur, click or rub, no peripheral edema and peripheral pulses strong  ABDOMEN: soft, nontender, no hepatosplenomegaly, no masses and bowel sounds normal   (female): normal female external genitalia, normal urethral meatus, vaginal mucosal atrophy noted, normal cervix, adnexae, and uterus without masses or abnormal discharge  MS: no musculoskeletal defects are noted and gait is age appropriate without ataxia  SKIN: no suspicious lesions or rashes  NEURO: Normal strength and tone, sensory exam grossly normal, mentation intact and speech normal  PSYCH: mentation appears normal and affect normal/bright    Diagnostic Test Results:  Labs reviewed in Epic    ASSESSMENT/PLAN:   1. Routine general medical examination at a health care facility    - DX Hip/Pelvis/Spine; Future    2. Screening for malignant neoplasm of cervix    - Pap imaged thin layer screen with HPV - recommended age 30 - 65 years (select HPV order below)  - HPV High Risk Types DNA Cervical    3. Essential hypertension  Stable no change in treatment plan.   - hydrochlorothiazide (HYDRODIURIL) 25 MG tablet; Take 1 tablet (25 mg) by mouth daily  Dispense: 90 tablet; Refill: 3  - Lipid panel reflex to direct LDL Fasting  - Comprehensive metabolic panel  - CBC with platelets differential    4. Gastroesophageal reflux disease without esophagitis  stable on meds   - pantoprazole (PROTONIX) 40 MG EC tablet; Take 1 tablet (40 mg) by mouth daily Take 60 mins before any food or drink intake  Dispense: 90 tablet; Refill: 3    5. Psychophysiological insomnia  Stable no change in treatment plan.   - temazepam (RESTORIL) 15 MG capsule; Take 1 to 2 capsules by mouth every night as needed for sleep  Dispense: 60 capsule; Refill: 5    6. Anxiety  Stable  "no change in treatment plan.   - temazepam (RESTORIL) 15 MG capsule; Take 1 to 2 capsules by mouth every night as needed for sleep  Dispense: 60 capsule; Refill: 5    7. Hypothyroidism, unspecified type  Adjust meds as indicated by above labs.   - TSH WITH FREE T4 REFLEX  - levothyroxine (SYNTHROID/LEVOTHROID) 125 MCG tablet; TAKE 1 TABLET(125 MCG) BY MOUTH DAILY  Dispense: 90 tablet; Refill: 3    8. Mild major depression (H)  Stable no change in treatment plan.     9. Nasal congestion  Trial of the below   - fluticasone (FLONASE) 50 MCG/ACT nasal spray; Spray 1 spray into both nostrils daily  Dispense: 16 g; Refill: 1    Patient has been advised of split billing requirements and indicates understanding: Yes  COUNSELING:  Reviewed preventive health counseling, as reflected in patient instructions    Estimated body mass index is 27.06 kg/m  as calculated from the following:    Height as of 3/5/21: 1.803 m (5' 11\").    Weight as of this encounter: 88 kg (194 lb).        She reports that she has never smoked. She has never used smokeless tobacco.      Counseling Resources:  ATP IV Guidelines  Pooled Cohorts Equation Calculator  Breast Cancer Risk Calculator  BRCA-Related Cancer Risk Assessment: FHS-7 Tool  FRAX Risk Assessment  ICSI Preventive Guidelines  Dietary Guidelines for Americans, 2010  USDA's MyPlate  ASA Prophylaxis  Lung CA Screening    Radha Fetlon MD  [facility]  "

## 2021-04-02 DIAGNOSIS — I10 ESSENTIAL HYPERTENSION: ICD-10-CM

## 2021-04-02 LAB
COPATH REPORT: NORMAL
PAP: NORMAL

## 2021-04-05 LAB
FINAL DIAGNOSIS: NORMAL
HPV HR 12 DNA CVX QL NAA+PROBE: NEGATIVE
HPV16 DNA SPEC QL NAA+PROBE: NEGATIVE
HPV18 DNA SPEC QL NAA+PROBE: NEGATIVE
SPECIMEN DESCRIPTION: NORMAL
SPECIMEN SOURCE CVX/VAG CYTO: NORMAL

## 2021-04-05 RX ORDER — HYDROCHLOROTHIAZIDE 25 MG/1
TABLET ORAL
Qty: 90 TABLET | Refills: 3 | OUTPATIENT
Start: 2021-04-05

## 2021-04-15 ENCOUNTER — HOSPITAL ENCOUNTER (OUTPATIENT)
Dept: BONE DENSITY | Facility: CLINIC | Age: 61
Discharge: HOME | End: 2021-04-15
Attending: FAMILY MEDICINE | Admitting: FAMILY MEDICINE
Payer: COMMERCIAL

## 2021-04-15 DIAGNOSIS — Z00.00 ROUTINE GENERAL MEDICAL EXAMINATION AT A HEALTH CARE FACILITY: ICD-10-CM

## 2021-04-15 PROCEDURE — 77080 DXA BONE DENSITY AXIAL: CPT

## 2021-05-04 ENCOUNTER — OFFICE VISIT (OUTPATIENT)
Dept: DERMATOLOGY | Facility: CLINIC | Age: 61
End: 2021-05-04
Payer: COMMERCIAL

## 2021-05-04 VITALS — DIASTOLIC BLOOD PRESSURE: 74 MMHG | HEART RATE: 69 BPM | OXYGEN SATURATION: 98 % | SYSTOLIC BLOOD PRESSURE: 134 MMHG

## 2021-05-04 DIAGNOSIS — D48.5 NEOPLASM OF UNCERTAIN BEHAVIOR OF SKIN: Primary | ICD-10-CM

## 2021-05-04 DIAGNOSIS — D22.9 MULTIPLE BENIGN NEVI: ICD-10-CM

## 2021-05-04 DIAGNOSIS — D18.01 CHERRY ANGIOMA: ICD-10-CM

## 2021-05-04 DIAGNOSIS — L82.1 SEBORRHEIC KERATOSIS: ICD-10-CM

## 2021-05-04 DIAGNOSIS — L82.0 INFLAMED SEBORRHEIC KERATOSIS: ICD-10-CM

## 2021-05-04 PROCEDURE — 17110 DESTRUCTION B9 LES UP TO 14: CPT | Performed by: PHYSICIAN ASSISTANT

## 2021-05-04 PROCEDURE — 11102 TANGNTL BX SKIN SINGLE LES: CPT | Mod: 59 | Performed by: PHYSICIAN ASSISTANT

## 2021-05-04 PROCEDURE — 88305 TISSUE EXAM BY PATHOLOGIST: CPT | Performed by: DERMATOLOGY

## 2021-05-04 PROCEDURE — 99213 OFFICE O/P EST LOW 20 MIN: CPT | Mod: 25 | Performed by: PHYSICIAN ASSISTANT

## 2021-05-04 NOTE — NURSING NOTE
Chief Complaint   Patient presents with     Skin Check     Moles       Vitals:    05/04/21 0932   BP: 134/74   BP Location: Left arm   Patient Position: Sitting   Cuff Size: Adult Large   Pulse: 69   SpO2: 98%     Wt Readings from Last 1 Encounters:   03/31/21 88 kg (194 lb)       Rama Feliciano LPN .................5/4/2021

## 2021-05-04 NOTE — LETTER
2021         RE: Stephanie Trejo   [address]  [facility] [address]        Dear Colleague,    Thank you for referring your patient, Stephanie Trejo, to the [facility]. Please see a copy of my visit note below.    Stephanie Trejo is an extremely pleasant 60 year old year old female patient here today for spots on back. She notes areas will itch. No pain or bleeding. She also notes mole on left knee that is getting darker in color. Patient has no other skin complaints today.  Remainder of the HPI, Meds, PMH, Allergies, FH, and SH was reviewed in chart.    Pertinent Hx:  No personal history of skin cancer.   Past Medical History:   Diagnosis Date     CHF (congestive heart failure) (H) 2013     Depressive disorder      Hypertension      Mitral valve insufficiency, acquired      Other and unspecified disc disorder of lumbar region      Scoliosis (and kyphoscoliosis), idiopathic      Undiagnosed cardiac murmurs      Unspecified hypothyroidism        Past Surgical History:   Procedure Laterality Date     BACK SURGERY       COLONOSCOPY       COLONOSCOPY N/A 2020    Procedure: COLONOSCOPY;  Surgeon: Shaun Torrez MD;  Location: [facility]     ESOPHAGOSCOPY, GASTROSCOPY, DUODENOSCOPY (EGD), COMBINED N/A 2020    Procedure: ESOPHAGOGASTRODUODENOSCOPY, WITH BIOPSY;  Surgeon: Elsie Kincaid MD;  Location: [facility]     GYN SURGERY       SOFT TISSUE SURGERY       SURGICAL HISTORY OF -            SURGICAL HISTORY OF -       Laparoscopy     SURGICAL HISTORY OF -       Hernia Repair      SURGICAL HISTORY OF -       Back Fusion T3 - L3        Family History   Problem Relation Age of Onset     Lipids Mother      Thyroid Disease Mother      Eye Disorder Mother         macular degeneration     Colon Cancer Mother      Anesthesia Reaction Mother         allergic to some     Hypertension Father      Cardiovascular Father         MI     Osteoporosis Father      Alzheimer  Disease Father      Genitourinary Problems Father         incontinence     Breast Cancer Maternal Grandmother      Cerebrovascular Disease Maternal Grandfather      Diabetes Paternal Grandmother      Gastrointestinal Disease Sister         colitis     Respiratory Daughter         asthma       Social History     Socioeconomic History     Marital status:      Spouse name: Not on file     Number of children: Not on file     Years of education: Not on file     Highest education level: Not on file   Occupational History     Not on file   Social Needs     Financial resource strain: Not on file     Food insecurity     Worry: Not on file     Inability: Not on file     Transportation needs     Medical: Not on file     Non-medical: Not on file   Tobacco Use     Smoking status: Never Smoker     Smokeless tobacco: Never Used   Substance and Sexual Activity     Alcohol use: No     Alcohol/week: 0.0 standard drinks     Drug use: No     Sexual activity: Yes     Partners: Male     Birth control/protection: Post-menopausal   Lifestyle     Physical activity     Days per week: Not on file     Minutes per session: Not on file     Stress: Not on file   Relationships     Social connections     Talks on phone: Not on file     Gets together: Not on file     Attends Rastafarian service: Not on file     Active member of club or organization: Not on file     Attends meetings of clubs or organizations: Not on file     Relationship status: Not on file     Intimate partner violence     Fear of current or ex partner: Not on file     Emotionally abused: Not on file     Physically abused: Not on file     Forced sexual activity: Not on file   Other Topics Concern     Parent/sibling w/ CABG, MI or angioplasty before 65F 55M? No   Social History Narrative     Not on file       Outpatient Encounter Medications as of 5/4/2021   Medication Sig Dispense Refill     fluticasone (FLONASE) 50 MCG/ACT nasal spray Spray 1 spray into both nostrils daily 16  g 1     hydrochlorothiazide (HYDRODIURIL) 25 MG tablet Take 1 tablet (25 mg) by mouth daily 90 tablet 3     ibuprofen (ADVIL/MOTRIN) 600 MG tablet TK 1 T PO Q 6 H PRF PAIN       levothyroxine (SYNTHROID/LEVOTHROID) 125 MCG tablet TAKE 1 TABLET(125 MCG) BY MOUTH DAILY 90 tablet 3     losartan (COZAAR) 50 MG tablet Take 1 tablet (50 mg) by mouth daily 90 tablet 3     pantoprazole (PROTONIX) 40 MG EC tablet Take 1 tablet (40 mg) by mouth daily Take 60 mins before any food or drink intake 90 tablet 3     temazepam (RESTORIL) 15 MG capsule Take 1 to 2 capsules by mouth every night as needed for sleep 60 capsule 5     No facility-administered encounter medications on file as of 5/4/2021.              O:   NAD, WDWN, Alert & Oriented, Mood & Affect wnl, Vitals stable   Here today alone   /74 (BP Location: Left arm, Patient Position: Sitting, Cuff Size: Adult Large)   Pulse 69   LMP 09/21/2012   SpO2 98%    General appearance normal   Vitals stable   Alert, oriented and in no acute distress     0.5 cm brown irregular macule on left superior knee   Stuck on papules and brown macules on trunk and ext   Red papules on trunk  Brown papules and macules with regular pigment network and borders on torso and extremities     The remainder of skin exam is normal     Eyes: Conjunctivae/lids:Normal     ENT: Lips: normal    MSK:Normal    Cardiovascular: peripheral edema none    Pulm: Breathing Normal    Neuro/Psych: Orientation:Alert and Orientedx3 ; Mood/Affect:normal   A/P:  1. R/O atypical nevus on left superior knee  TANGENTIAL BIOPSY SENT OUT:  After consent, anesthesia with LEC and prep, tangential excision performed and specimen sent out for permanent section histology.  No complications and routine wound care. Patient told to call our office in 1-2 weeks for result.      2. Inflamed seborrheic keratosis on lower chest and mid back x 14  LN2:  Treated with LN2 for 5s for 1-2 cycles. Warned risks of blistering, pain,  pigment change, scarring, and incomplete resolution.  Advised patient to return if lesions do not completely resolve.  Wound care sheet given.  3. Seborrheic keratosis, lentigo, angioma, benign nevi   BENIGN LESIONS DISCUSSED WITH PATIENT:  I discussed the specifics of tumor, prognosis, and genetics of benign lesions.  I explained that treatment of these lesions would be purely cosmetic and not medically neccessary.  I discussed with patient different removal options including excision, cautery and /or laser.      Nature and genetics of benign skin lesions dicussed with patient.  Signs and Symptoms of skin cancer discussed with patient.  ABCDEs of melanoma reviewed with patient.  Patient encouraged to perform monthly skin exams.  UV precautions reviewed with patient.  Risks of non-melanoma skin cancer discussed with patient   Return to clinic pending biopsy results.           Again, thank you for allowing me to participate in the care of your patient.        Sincerely,        Sandy Saleem PA-C

## 2021-05-04 NOTE — PROGRESS NOTES
Stephanie Trejo is an extremely pleasant 60 year old year old female patient here today for spots on back. She notes areas will itch. No pain or bleeding. She also notes mole on left knee that is getting darker in color. Patient has no other skin complaints today.  Remainder of the HPI, Meds, PMH, Allergies, FH, and SH was reviewed in chart.    Pertinent Hx:  No personal history of skin cancer.   Past Medical History:   Diagnosis Date     CHF (congestive heart failure) (H) 2013     Depressive disorder      Hypertension      Mitral valve insufficiency, acquired      Other and unspecified disc disorder of lumbar region      Scoliosis (and kyphoscoliosis), idiopathic      Undiagnosed cardiac murmurs      Unspecified hypothyroidism        Past Surgical History:   Procedure Laterality Date     BACK SURGERY       COLONOSCOPY       COLONOSCOPY N/A 2020    Procedure: COLONOSCOPY;  Surgeon: Shaun Torrez MD;  Location: [facility]     ESOPHAGOSCOPY, GASTROSCOPY, DUODENOSCOPY (EGD), COMBINED N/A 2020    Procedure: ESOPHAGOGASTRODUODENOSCOPY, WITH BIOPSY;  Surgeon: Elsie Kincaid MD;  Location: [facility]     GYN SURGERY       SOFT TISSUE SURGERY       SURGICAL HISTORY OF -            SURGICAL HISTORY OF -       Laparoscopy     SURGICAL HISTORY OF -       Hernia Repair      SURGICAL HISTORY OF -       Back Fusion T3 - L3        Family History   Problem Relation Age of Onset     Lipids Mother      Thyroid Disease Mother      Eye Disorder Mother         macular degeneration     Colon Cancer Mother      Anesthesia Reaction Mother         allergic to some     Hypertension Father      Cardiovascular Father         MI     Osteoporosis Father      Alzheimer Disease Father      Genitourinary Problems Father         incontinence     Breast Cancer Maternal Grandmother      Cerebrovascular Disease Maternal Grandfather      Diabetes Paternal Grandmother      Gastrointestinal Disease Sister         colitis      Respiratory Daughter         asthma       Social History     Socioeconomic History     Marital status:      Spouse name: Not on file     Number of children: Not on file     Years of education: Not on file     Highest education level: Not on file   Occupational History     Not on file   Social Needs     Financial resource strain: Not on file     Food insecurity     Worry: Not on file     Inability: Not on file     Transportation needs     Medical: Not on file     Non-medical: Not on file   Tobacco Use     Smoking status: Never Smoker     Smokeless tobacco: Never Used   Substance and Sexual Activity     Alcohol use: No     Alcohol/week: 0.0 standard drinks     Drug use: No     Sexual activity: Yes     Partners: Male     Birth control/protection: Post-menopausal   Lifestyle     Physical activity     Days per week: Not on file     Minutes per session: Not on file     Stress: Not on file   Relationships     Social connections     Talks on phone: Not on file     Gets together: Not on file     Attends Mandaen service: Not on file     Active member of club or organization: Not on file     Attends meetings of clubs or organizations: Not on file     Relationship status: Not on file     Intimate partner violence     Fear of current or ex partner: Not on file     Emotionally abused: Not on file     Physically abused: Not on file     Forced sexual activity: Not on file   Other Topics Concern     Parent/sibling w/ CABG, MI or angioplasty before 65F 55M? No   Social History Narrative     Not on file       Outpatient Encounter Medications as of 5/4/2021   Medication Sig Dispense Refill     fluticasone (FLONASE) 50 MCG/ACT nasal spray Spray 1 spray into both nostrils daily 16 g 1     hydrochlorothiazide (HYDRODIURIL) 25 MG tablet Take 1 tablet (25 mg) by mouth daily 90 tablet 3     ibuprofen (ADVIL/MOTRIN) 600 MG tablet TK 1 T PO Q 6 H PRF PAIN       levothyroxine (SYNTHROID/LEVOTHROID) 125 MCG tablet TAKE 1 TABLET(125 MCG)  BY MOUTH DAILY 90 tablet 3     losartan (COZAAR) 50 MG tablet Take 1 tablet (50 mg) by mouth daily 90 tablet 3     pantoprazole (PROTONIX) 40 MG EC tablet Take 1 tablet (40 mg) by mouth daily Take 60 mins before any food or drink intake 90 tablet 3     temazepam (RESTORIL) 15 MG capsule Take 1 to 2 capsules by mouth every night as needed for sleep 60 capsule 5     No facility-administered encounter medications on file as of 5/4/2021.              O:   NAD, WDWN, Alert & Oriented, Mood & Affect wnl, Vitals stable   Here today alone   /74 (BP Location: Left arm, Patient Position: Sitting, Cuff Size: Adult Large)   Pulse 69   LMP 09/21/2012   SpO2 98%    General appearance normal   Vitals stable   Alert, oriented and in no acute distress     0.5 cm brown irregular macule on left superior knee   Stuck on papules and brown macules on trunk and ext   Red papules on trunk  Brown papules and macules with regular pigment network and borders on torso and extremities     The remainder of skin exam is normal     Eyes: Conjunctivae/lids:Normal     ENT: Lips: normal    MSK:Normal    Cardiovascular: peripheral edema none    Pulm: Breathing Normal    Neuro/Psych: Orientation:Alert and Orientedx3 ; Mood/Affect:normal   A/P:  1. R/O atypical nevus on left superior knee  TANGENTIAL BIOPSY SENT OUT:  After consent, anesthesia with LEC and prep, tangential excision performed and specimen sent out for permanent section histology.  No complications and routine wound care. Patient told to call our office in 1-2 weeks for result.      2. Inflamed seborrheic keratosis on lower chest and mid back x 14  LN2:  Treated with LN2 for 5s for 1-2 cycles. Warned risks of blistering, pain, pigment change, scarring, and incomplete resolution.  Advised patient to return if lesions do not completely resolve.  Wound care sheet given.  3. Seborrheic keratosis, lentigo, angioma, benign nevi   BENIGN LESIONS DISCUSSED WITH PATIENT:  I discussed the  specifics of tumor, prognosis, and genetics of benign lesions.  I explained that treatment of these lesions would be purely cosmetic and not medically neccessary.  I discussed with patient different removal options including excision, cautery and /or laser.      Nature and genetics of benign skin lesions dicussed with patient.  Signs and Symptoms of skin cancer discussed with patient.  ABCDEs of melanoma reviewed with patient.  Patient encouraged to perform monthly skin exams.  UV precautions reviewed with patient.  Risks of non-melanoma skin cancer discussed with patient   Return to clinic pending biopsy results.

## 2021-05-04 NOTE — PATIENT INSTRUCTIONS
Wound Care Instructions     FOR SUPERFICIAL WOUNDS     [facility] [phone]    [facility] [phone]    Left lower thigh    AFTER 24 HOURS YOU SHOULD REMOVE THE BANDAGE AND BEGIN DAILY DRESSING CHANGES AS FOLLOWS:     1) Remove Dressing.     2) Clean and dry the area with tap water using a Q-tip or sterile gauze pad.     3) Apply Vaseline, Aquaphor, Polysporin ointment or Bacitracin ointment over entire wound.  Do NOT use Neosporin ointment.     4) Cover the wound with a band-aid, or a sterile non-stick gauze pad and micropore paper tape      REPEAT THESE INSTRUCTIONS AT LEAST ONCE A DAY UNTIL THE WOUND HAS COMPLETELY HEALED.    It is an old wives tale that a wound heals better when it is exposed to air and allowed to dry out. The wound will heal faster with a better cosmetic result if it is kept moist with ointment and covered with a bandage.    **Do not let the wound dry out.**      Supplies Needed:      *Cotton tipped applicators (Q-tips)    *Polysporin Ointment or Bacitracin Ointment (NOT NEOSPORIN)    *Band-aids or non-stick gauze pads and micropore paper tape.      PATIENT INFORMATION:    During the healing process you will notice a number of changes. All wounds develop a small halo of redness surrounding the wound.  This means healing is occurring. Severe itching with extensive redness usually indicates sensitivity to the ointment or bandage tape used to dress the wound.  You should call our office if this develops.      Swelling  and/or discoloration around your surgical site is common, particularly when performed around the eye.    All wounds normally drain.  The larger the wound the more drainage there will be.  After 7-10 days, you will notice the wound beginning to shrink and new skin will begin to grow.  The wound is healed when you can see skin has formed over the entire area.  A healed wound has a healthy, shiny look to the surface and is red to dark pink in color  to normalize.  Wounds may take approximately 4-6 weeks to heal.  Larger wounds may take 6-8 weeks.  After the wound is healed you may discontinue dressing changes.    You may experience a sensation of tightness as your wound heals. This is normal and will gradually subside.    Your healed wound may be sensitive to temperature changes. This sensitivity improves with time, but if you re having a lot of discomfort, try to avoid temperature extremes.    Patients frequently experience itching after their wound appears to have healed because of the continue healing under the skin.  Plain Vaseline will help relieve the itching.        POSSIBLE COMPLICATIONS    BLEEDIN. Leave the bandage in place.  2. Use tightly rolled up gauze or a cloth to apply direct pressure over the bandage for 30  minutes.  3. Reapply pressure for an additional 30 minutes if necessary  4. Use additional gauze and tape to maintain pressure once the bleeding has stopped.

## 2021-05-07 LAB — COPATH REPORT: NORMAL

## 2021-05-10 DIAGNOSIS — F51.04 PSYCHOPHYSIOLOGICAL INSOMNIA: ICD-10-CM

## 2021-05-10 DIAGNOSIS — F41.9 ANXIETY: ICD-10-CM

## 2021-05-10 RX ORDER — TEMAZEPAM 15 MG/1
CAPSULE ORAL
Qty: 60 CAPSULE | Refills: 2 | Status: SHIPPED | OUTPATIENT
Start: 2021-05-10 | End: 2021-11-02

## 2021-06-01 DIAGNOSIS — E03.9 HYPOTHYROIDISM, UNSPECIFIED TYPE: ICD-10-CM

## 2021-06-01 RX ORDER — LEVOTHYROXINE SODIUM 125 UG/1
TABLET ORAL
Qty: 90 TABLET | Refills: 3 | Status: SHIPPED | OUTPATIENT
Start: 2021-06-01 | End: 2022-06-03

## 2021-08-29 DIAGNOSIS — I10 ESSENTIAL HYPERTENSION: ICD-10-CM

## 2021-08-30 RX ORDER — LOSARTAN POTASSIUM 50 MG/1
TABLET ORAL
Qty: 90 TABLET | Refills: 0 | Status: SHIPPED | OUTPATIENT
Start: 2021-08-30 | End: 2021-11-26

## 2021-09-04 ENCOUNTER — HEALTH MAINTENANCE LETTER (OUTPATIENT)
Age: 61
End: 2021-09-04

## 2021-10-29 DIAGNOSIS — F41.9 ANXIETY: ICD-10-CM

## 2021-10-29 DIAGNOSIS — F51.04 PSYCHOPHYSIOLOGICAL INSOMNIA: ICD-10-CM

## 2021-11-01 NOTE — TELEPHONE ENCOUNTER
Last Written Prescription Date:  05/10/21  Last Fill Quantity: 30,  # refills: 2   Last office visit: 3/31/2021 with prescribing provider     Requested Prescriptions   Pending Prescriptions Disp Refills    temazepam (RESTORIL) 15 MG capsule [Pharmacy Med Name: TEMAZEPAM 15MG CAPSULES] 60 capsule      Sig: TAKE 1 TO 2 CAPSULES BY MOUTH EVERY NIGHT AS NEEDED FOR SLEEP        There is no refill protocol information for this order         Zeinab PETERSON RN

## 2021-11-02 RX ORDER — TEMAZEPAM 15 MG/1
CAPSULE ORAL
Qty: 60 CAPSULE | Refills: 5 | Status: SHIPPED | OUTPATIENT
Start: 2021-11-02 | End: 2022-06-03

## 2021-11-10 ENCOUNTER — IMMUNIZATION (OUTPATIENT)
Dept: FAMILY MEDICINE | Facility: CLINIC | Age: 61
End: 2021-11-10
Payer: COMMERCIAL

## 2021-11-10 DIAGNOSIS — Z23 NEED FOR PROPHYLACTIC VACCINATION AND INOCULATION AGAINST INFLUENZA: Primary | ICD-10-CM

## 2021-11-10 PROCEDURE — 90682 RIV4 VACC RECOMBINANT DNA IM: CPT

## 2021-11-10 PROCEDURE — 90471 IMMUNIZATION ADMIN: CPT

## 2021-11-18 ENCOUNTER — E-VISIT (OUTPATIENT)
Dept: FAMILY MEDICINE | Facility: CLINIC | Age: 61
End: 2021-11-18
Payer: COMMERCIAL

## 2021-11-18 DIAGNOSIS — J01.90 ACUTE BACTERIAL SINUSITIS: Primary | ICD-10-CM

## 2021-11-18 DIAGNOSIS — B96.89 ACUTE BACTERIAL SINUSITIS: Primary | ICD-10-CM

## 2021-11-18 PROCEDURE — 99421 OL DIG E/M SVC 5-10 MIN: CPT | Performed by: FAMILY MEDICINE

## 2021-11-18 RX ORDER — DOXYCYCLINE HYCLATE 100 MG
100 TABLET ORAL 2 TIMES DAILY
Qty: 14 TABLET | Refills: 0 | Status: SHIPPED | OUTPATIENT
Start: 2021-11-18 | End: 2021-11-25

## 2021-11-18 NOTE — PATIENT INSTRUCTIONS
Sinusitis (Antibiotic Treatment)    The sinuses are air-filled spaces within the bones of the face. They connect to the inside of the nose. Sinusitis is an inflammation of the tissue that lines the sinuses. Sinusitis can occur during a cold. It can also happen due to allergies to pollens and other particles in the air. Sinusitis can cause symptoms of sinus congestion and a feeling of fullness. A sinus infection causes fever, headache, and facial pain. There is often green or yellow fluid draining from the nose or into the back of the throat (post-nasal drip). You have been given antibiotics to treat this condition.   Home care    Take the full course of antibiotics as instructed. Don't stop taking them, even when you feel better.    Drink plenty of water, hot tea, and other liquids as directed by the healthcare provider. This may help thin nasal mucus. It also may help your sinuses drain fluids.    Heat may help soothe painful areas of your face. Use a towel soaked in hot water. Or,  the shower and direct the warm spray onto your face. Using a vaporizer along with a menthol rub at night may also help soothe symptoms.     An expectorant with guaifenesin may help thin nasal mucus and help your sinuses drain fluids. Talk with your provider or pharmacists before taking an over-the-counter (OTC) medicine if you have any questions about it or its side effects..    You can use an OTC decongestant, unless a similar medicine was prescribed to you. Nasal sprays work the fastest. Use one that contains phenylephrine or oxymetazoline. First blow your nose gently. Then use the spray. Don't use these medicines more often than directed on the label. If you do, your symptoms may get worse. You may also take pills that contain pseudoephedrine. Don t use products that combine multiple medicines. This is because side effects may be increased. Read labels. You can also ask the pharmacist for help. (People with high blood  pressure should not use decongestants. They can raise blood pressure.) Talk with your provider or pharmacist if you have any questions about the medicine..    OTC antihistamines may help if allergies contributed to your sinusitis. Talk with your provider or pharmacist if you have any questions about the medicine..    Don't use nasal rinses or irrigation during an acute sinus infection, unless your healthcare provider tells you to. Rinsing may spread the infection to other areas in your sinuses.    Use acetaminophen or ibuprofen to control pain, unless another pain medicine was prescribed to you. If you have chronic liver or kidney disease or ever had a stomach ulcer, talk with your healthcare provider before using these medicines. Never give aspirin to anyone under age 18 who is ill with a fever. It may cause severe liver damage.    Don't smoke. This can make symptoms worse.    Follow-up care  Follow up with your healthcare provider, or as advised.   When to seek medical advice  Call your healthcare provider if any of these occur:     Facial pain or headache that gets worse    Stiff neck    Unusual drowsiness or confusion    Swelling of your forehead or eyelids    Symptoms don't go away in 10 days    Vision problems, such as blurred or double vision    Fever of 100.4 F (38 C) or higher, or as directed by your healthcare provider  Call 911  Call 911 if any of these occur:     Seizure    Trouble breathing    Feeling dizzy or faint    Fingernails, skin or lips look blue, purple , or gray  Prevention  Here are steps you can take to help prevent an infection:     Keep good hand washing habits.    Don t have close contact with people who have sore throats, colds, or other upper respiratory infections.    Don t smoke, and stay away from secondhand smoke.    Stay up to date with of your vaccines.  [employer] last reviewed this educational content on 12/1/2019 2000-2021 [employer]. All rights reserved. This  information is not intended as a substitute for professional medical care. Always follow your healthcare professional's instructions.        Dear Stephanie Trejo    After reviewing your responses, I've been able to diagnose you with?a sinus infection caused by bacteria.?     Based on your responses and diagnosis, I have prescribed doxycycline to treat your symptoms. I have sent this to your pharmacy.?     It is also important to stay well hydrated, get lots of rest and take over-the-counter decongestants,?tylenol?or ibuprofen if you?are able to?take those medications per your primary care provider to help relieve discomfort.?     It is important that you take?all of?your prescribed medication even if your symptoms are improving after a few doses.? Taking?all of?your medicine helps prevent the symptoms from returning.?     If your symptoms worsen, you develop severe headache, vomiting, high fever (>102), or are not improving in 7 days, please contact your primary care provider for an appointment or visit any of our convenient Walk-in Care or Urgent Care Centers to be seen which can be found on our website?here.?     Thanks again for choosing?us?as your health care partner,?   ?  Radha Felton MD?

## 2021-11-22 ENCOUNTER — TELEPHONE (OUTPATIENT)
Dept: FAMILY MEDICINE | Facility: CLINIC | Age: 61
End: 2021-11-22
Payer: COMMERCIAL

## 2021-11-22 ENCOUNTER — OFFICE VISIT (OUTPATIENT)
Dept: URGENT CARE | Facility: URGENT CARE | Age: 61
End: 2021-11-22
Payer: COMMERCIAL

## 2021-11-22 ENCOUNTER — ANCILLARY PROCEDURE (OUTPATIENT)
Dept: GENERAL RADIOLOGY | Facility: CLINIC | Age: 61
End: 2021-11-22
Attending: FAMILY MEDICINE
Payer: COMMERCIAL

## 2021-11-22 VITALS
SYSTOLIC BLOOD PRESSURE: 130 MMHG | RESPIRATION RATE: 18 BRPM | OXYGEN SATURATION: 95 % | TEMPERATURE: 97.9 F | WEIGHT: 201.8 LBS | HEART RATE: 70 BPM | BODY MASS INDEX: 28.15 KG/M2 | DIASTOLIC BLOOD PRESSURE: 74 MMHG

## 2021-11-22 DIAGNOSIS — R09.89 CHEST CONGESTION: ICD-10-CM

## 2021-11-22 DIAGNOSIS — R09.89 CHEST CONGESTION: Primary | ICD-10-CM

## 2021-11-22 PROCEDURE — U0005 INFEC AGEN DETEC AMPLI PROBE: HCPCS | Performed by: FAMILY MEDICINE

## 2021-11-22 PROCEDURE — 71046 X-RAY EXAM CHEST 2 VIEWS: CPT | Performed by: RADIOLOGY

## 2021-11-22 PROCEDURE — 99214 OFFICE O/P EST MOD 30 MIN: CPT | Performed by: FAMILY MEDICINE

## 2021-11-22 PROCEDURE — U0003 INFECTIOUS AGENT DETECTION BY NUCLEIC ACID (DNA OR RNA); SEVERE ACUTE RESPIRATORY SYNDROME CORONAVIRUS 2 (SARS-COV-2) (CORONAVIRUS DISEASE [COVID-19]), AMPLIFIED PROBE TECHNIQUE, MAKING USE OF HIGH THROUGHPUT TECHNOLOGIES AS DESCRIBED BY CMS-2020-01-R: HCPCS | Performed by: FAMILY MEDICINE

## 2021-11-22 NOTE — TELEPHONE ENCOUNTER
I talked with Stephanie and she says the antibiotic she is currently taking makes her sick to her stomach.  Has loss of appetite.  States is no better since E Visit 11/18/21  She will probably end up going to urgent care    Davida Riddle RN

## 2021-11-22 NOTE — Clinical Note
Stephanie Garcia's covid test is positive. I don't think this is the same illness she had in the beginning of the month but am unsure for start date to say for the onset of the new illness for consideration of isolation and monoclonals. She looked ok clinically and was really nice. Holland Joe MD

## 2021-11-22 NOTE — TELEPHONE ENCOUNTER
Reason for call:  Other   Patient called regarding (reason for call): appointment  Additional comments: PT had an virtual visit with Dr Felton on Nov 18th, said she had a possible sinus infection, and was told to come into the clinic to be seen since it has been going on for too long. In person was okay and could be with anyone, does not have to be with Dr Felton. Advised trying to get in this week, no openings until next week, please call back if she can be seen sooner.     Phone number to reach patient:  Cell number on file:    Telephone Information:   Mobile [phone]       Best Time:  Anytime    Can we leave a detailed message on this number?  YES    Travel screening: Not Applicable

## 2021-11-23 ENCOUNTER — NURSE TRIAGE (OUTPATIENT)
Dept: NURSING | Facility: CLINIC | Age: 61
End: 2021-11-23
Payer: COMMERCIAL

## 2021-11-23 LAB — SARS-COV-2 RNA RESP QL NAA+PROBE: POSITIVE

## 2021-11-23 NOTE — PATIENT INSTRUCTIONS
ibuprofen for achiness and pain and fever    Pseudoephedrine, guaifenesin, afrin (oxymetolazone-max 3 days) and hot steamy beverages and soups and showers for congestion.     flonase for congestion.

## 2021-11-23 NOTE — PROGRESS NOTES
ICD-10-CM    1. Chest congestion  R09.89 Symptomatic COVID-19 Virus (Coronavirus) by PCR Nasopharyngeal     XR Chest 2 Views     this sounds most like a new viral illness rather than a persistent one for 3+ weeks. The covid test is pending. Does not quite sounds like a bacterial sinus infection at present  -------------------------------  Stephanie A Trejo with presents with a longer illness which seems to have worsened. She tested negative for covid early November. symptoms worsened and last week was given doxy for possible sinus infection    Now with cough. Sense of chest tightness, headache and some congestion but that is not bad nor is there and purulent drainage.     Treatment measures tried include Tylenol/Ibuprofen.      Current Outpatient Medications   Medication Sig Dispense Refill     doxycycline hyclate (VIBRA-TABS) 100 MG tablet Take 1 tablet (100 mg) by mouth 2 times daily for 7 days 14 tablet 0     hydrochlorothiazide (HYDRODIURIL) 25 MG tablet Take 1 tablet (25 mg) by mouth daily 90 tablet 3     ibuprofen (ADVIL/MOTRIN) 600 MG tablet TK 1 T PO Q 6 H PRF PAIN       levothyroxine (SYNTHROID/LEVOTHROID) 125 MCG tablet TAKE 1 TABLET(125 MCG) BY MOUTH DAILY 90 tablet 3     losartan (COZAAR) 50 MG tablet TAKE 1 TABLET(50 MG) BY MOUTH DAILY 90 tablet 0     pantoprazole (PROTONIX) 40 MG EC tablet Take 1 tablet (40 mg) by mouth daily Take 60 mins before any food or drink intake 90 tablet 3     temazepam (RESTORIL) 15 MG capsule TAKE 1 TO 2 CAPSULES BY MOUTH EVERY NIGHT AS NEEDED FOR SLEEP 60 capsule 5     fluticasone (FLONASE) 50 MCG/ACT nasal spray Spray 1 spray into both nostrils daily 16 g 1       ROS otherwise negative for resp., ID,  HEENT symptoms.    Objective: /74 (BP Location: Right arm, Patient Position: Sitting, Cuff Size: Adult Regular)   Pulse 70   Temp 97.9  F (36.6  C) (Tympanic)   Resp 18   Wt 91.5 kg (201 lb 12.8 oz)   LMP 09/21/2012   SpO2 95%   BMI 28.15 kg/m    Exam:  GENERAL  APPEARANCE: healthy, alert and no distress  EYES: Eyes grossly normal to inspection  HENT: ear canals and TM's normal and nose and mouth without ulcers or lesions  NECK: no adenopathy, no asymmetry, masses, or scars and thyroid normal to palpation  RESP: lungs clear to auscultation - no rales, rhonchi or wheezes  CV: regular rates and rhythm, no murmur    cxr chronic changes. No infiltrates.

## 2021-11-23 NOTE — TELEPHONE ENCOUNTER
"Been sick since Halloween  Had a terrible cough and a headache and sinus pressure.  On Halloween.  Cough is gone, terrible headache,  Upset stomach.  Nothing sounds good. Most of the time she is nausea .  Sleeping well.    Had a positive test.yesterday, and has questions about quarantine, and treatment.    Patient advised per Covid protocols on all of the above.    Nika Downey RN RN  [facility] Triage/refill nurse  COVID 19 Nurse Triage Plan/Patient Instructions    Please be aware that novel coronavirus (COVID-19) may be circulating in the community. If you develop symptoms such as fever, cough, or SOB or if you have concerns about the presence of another infection including coronavirus (COVID-19), please contact your health care provider or visit [email or web address].     Disposition/Instructions    Home care recommended. Follow home care protocol based instructions.  Additional COVID19 information to add for patients.   How can I protect others?  If you have symptoms (fever, cough, body aches or trouble breathing): Stay home and away from others (self-isolate) until:    At least 10 days have passed since your symptoms started, And     You ve had no fever--and no medicine that reduces fever--for 1 full day (24 hours), And      Your other symptoms have resolved (gotten better).     If you don t have symptoms, but a test showed that you have COVID-19 (you tested positive):    Stay home and away from others (self-isolate). Follow the tips under \"How do I self-isolate?\" below for 10 days (20 days if you have a weak immune system).    You don't need to be retested for COVID-19 before going back to school or work. As long as you're fever-free and feeling better, you can go back to school, work and other activities after waiting the 10 or 20 days.     How do I self-isolate?    Stay in your own room, even for meals. Use your own bathroom if you can.     Stay away from others in your home. No hugging, " kissing or shaking hands. No visitors.    Don t go to work, school or anywhere else.     Clean  high touch  surfaces often (doorknobs, counters, handles, etc.). Use a household cleaning spray or wipes. You ll find a full list on the EPA website:  www.epa.gov/pesticide-registration/list-n-disinfectants-use-against-sars-cov-2.    Cover your mouth and nose with a mask, tissue or washcloth to avoid spreading germs.    Wash your hands and face often. Use soap and water.    Caregivers in these groups are at risk for severe illness due to COVID-19:  o People 65 years and older  o People who live in a nursing home or long-term care facility  o People with chronic disease (lung, heart, cancer, diabetes, kidney, liver, immunologic)  o People who have a weakened immune system, including those who:  - Are in cancer treatment  - Take medicine that weakens the immune system, such as corticosteroids  - Had a bone marrow or organ transplant  - Have an immune deficiency  - Have poorly controlled HIV or AIDS  - Are obese (body mass index of 40 or higher)  - Smoke regularly    Caregivers should wear gloves while washing dishes, handling laundry and cleaning bedrooms and bathrooms.    Use caution when washing and drying laundry: Don t shake dirty laundry, and use the warmest water setting that you can.    For more tips, go to www.cdc.gov/coronavirus/2019-ncov/downloads/10Things.pdf.    How can I take care of myself?  1. Get lots of rest. Drink extra fluids (unless a doctor has told you not to).     2. Take Tylenol (acetaminophen) for fever or pain. If you have liver or kidney problems, ask your family doctor if it s okay to take Tylenol.     Adults can take either:     650 mg (two 325 mg pills) every 4 to 6 hours, or     1,000 mg (two 500 mg pills) every 8 hours as needed.     Note: Don t take more than 3,000 mg in one day.   Acetaminophen is found in many medicines (both prescribed and over-the-counter medicines). Read all labels to  be sure you don t take too much.     For children, check the Tylenol bottle for the right dose. The dose is based on the child s age or weight.    3. If you have other health problems (like cancer, heart failure, an organ transplant or severe kidney disease): Call your specialty clinic if you don t feel better in the next 2 days.    4. Know when to call 911: Emergency warning signs include:    Trouble breathing or shortness of breath    Pain or pressure in the chest that doesn t go away    Feeling confused like you haven t felt before, or not being able to wake up    Bluish-colored lips or face    What are the symptoms of COVID-19?     The most common symptoms are cough, fever and trouble breathing.     Less common symptoms include body aches, chills, diarrhea (loose, watery poops), fatigue (feeling very tired), headache, runny nose, sore throat and loss of smell.    COVID-19 can cause severe coughing (bronchitis) and lung infection (pneumonia).    How does it spread?     The virus may spread when a person coughs or sneezes into the air. The virus can travel about 6 feet this way, and it can live on surfaces.      Common  (household disinfectants) will kill the virus.    Who is at risk?  Anyone can catch COVID-19 if they re around someone who has the virus.    How can others protect themselves?     Stay away from people who have COVID-19 (or symptoms of COVID-19).    Wash hands often with soap and water. Or, use hand  with at least 60% alcohol.    Avoid touching the eyes, nose or mouth.     Wear a face mask when you go out in public, when sick or when caring for a sick person.    Where can I get more information?     [facility]: About COVID-19: [email or web address]    CDC: What to Do If You re Sick: www.cdc.gov/coronavirus/2019-ncov/about/steps-when-sick.html    CDC: Ending Home Isolation: www.cdc.gov/coronavirus/2019-ncov/hcp/disposition-in-home-patients.html     CDC: Caring for  Someone: www.cdc.gov/coronavirus/2019-ncov/if-you-are-sick/care-for-someone.html     [facility]: Interim Guidance for Hospital Discharge to Home: www.[facility]./diseases/coronavirus/hcp/hospdischarge.pdf    [facility] clinical trials (COVID-19 research studies): [email or web address]-clinical-trials     Below are the COVID-19 hotlines at the [place] [facility] ([facility]). Interpreters are available.   o For health questions: Call 013-505-3805 or 1-564.778.7177 (7 a.m. to 7 p.m.)  o For questions about schools and childcare: Call 573-757-5270 or 1-230.435.2566 (7 a.m. to 7 p.m.)          Thank you for taking steps to prevent the spread of this virus.  o Limit your contact with others.  o Wear a simple mask to cover your cough.  o Wash your hands well and often.    Resources    [facility]: About COVID-19: [email or web address]    CDC: What to Do If You're Sick: www.cdc.gov/coronavirus/2019-ncov/about/steps-when-sick.html    CDC: Ending Home Isolation: www.cdc.gov/coronavirus/2019-ncov/hcp/disposition-in-home-patients.html     CDC: Caring for Someone: www.cdc.gov/coronavirus/2019-ncov/if-you-are-sick/care-for-someone.html     [facility]: Interim Guidance for Hospital Discharge to Home: www.[facility]./diseases/coronavirus/hcp/hospdischarge.pdf    [facility] clinical trials (COVID-19 research studies): [email or web address]-clinical-trials     Below are the COVID-19 hotlines at the [place] [facility] ([facility]). Interpreters are available.   o For health questions: Call 687-007-6197 or 1-795.270.6247 (7 a.m. to 7 p.m.)  o For questions about schools and childcare: Call 696-147-5746 or 1-798.852.4255 (7 a.m. to 7 p.m.)       Reason for Disposition    [1] PERSISTING SYMPTOMS OF COVID-19 AND [2] symptoms BETTER (improving)    Additional Information    Negative: [1] PERSISTING SYMPTOMS OF COVID-19 AND [2] symptoms SAME AND [3] medical visit for COVID-19  in past 2 weeks    Negative: [1] PERSISTING SYMPTOMS OF COVID-19 AND [2] NO medical visit for COVID-19 in past 2 weeks    Negative: [1] Caller has NON-URGENT question AND [2] triager unable to answer    Negative: [1] PERSISTING SYMPTOMS OF COVID-19 AND [2] symptoms WORSE    Negative: [1] PERSISTING SYMPTOMS OF COVID-19 AND [2] NEW symptom AND [3] could be serious    Negative: [1] Caller has URGENT question AND [2] triager unable to answer question    Protocols used: CORONAVIRUS (COVID-19) PERSISTING SYMPTOMS FOLLOW-UP CALL-A- 8.25.2021

## 2021-11-24 DIAGNOSIS — I10 ESSENTIAL HYPERTENSION: ICD-10-CM

## 2021-11-26 RX ORDER — LOSARTAN POTASSIUM 50 MG/1
TABLET ORAL
Qty: 90 TABLET | Refills: 0 | Status: SHIPPED | OUTPATIENT
Start: 2021-11-26 | End: 2022-02-23

## 2022-03-29 ENCOUNTER — DOCUMENTATION ONLY (OUTPATIENT)
Dept: LAB | Facility: CLINIC | Age: 62
End: 2022-03-29
Payer: COMMERCIAL

## 2022-03-29 DIAGNOSIS — E03.9 HYPOTHYROIDISM, UNSPECIFIED TYPE: Primary | ICD-10-CM

## 2022-03-29 DIAGNOSIS — I10 ESSENTIAL HYPERTENSION: ICD-10-CM

## 2022-03-29 NOTE — PROGRESS NOTES
Patient has lab appointment on 04/01/2022 for TSH. Please place orders for appointment.  Thank you lab

## 2022-03-29 NOTE — PROGRESS NOTES
Stephanie DOROTHY Cervanteser has an upcoming lab appointment:    Future Appointments   Date Time Provider Department Center   4/1/2022  3:45 PM [facility]   5/4/2022  4:45 PM [facility] CHERIEM LEE QUIÑONEZ   5/31/2022  1:00 PM Sandy Blackman PA-C [facility]     Patient is scheduled for the following lab(s): Per [employer] message to patient - was told to make lab only appt and come in for Thyroid labs.  Please place future orders.    There is no order available. Please review and place either future orders or HMPO (Review of Health Maintenance Protocol Orders), as appropriate.    Health Maintenance Due   Topic     ANNUAL REVIEW OF HM ORDERS      HIV SCREENING      TSH W/FREE T4 REFLEX      Shagufta Alcala

## 2022-04-06 ENCOUNTER — LAB (OUTPATIENT)
Dept: LAB | Facility: CLINIC | Age: 62
End: 2022-04-06
Payer: COMMERCIAL

## 2022-04-06 DIAGNOSIS — E03.9 HYPOTHYROIDISM, UNSPECIFIED TYPE: ICD-10-CM

## 2022-04-06 DIAGNOSIS — I10 ESSENTIAL HYPERTENSION: ICD-10-CM

## 2022-04-06 LAB
ALBUMIN SERPL-MCNC: 3.9 G/DL (ref 3.4–5)
ALP SERPL-CCNC: 93 U/L (ref 40–150)
ALT SERPL W P-5'-P-CCNC: 31 U/L (ref 0–50)
ANION GAP SERPL CALCULATED.3IONS-SCNC: 5 MMOL/L (ref 3–14)
AST SERPL W P-5'-P-CCNC: 28 U/L (ref 0–45)
BILIRUB SERPL-MCNC: 0.5 MG/DL (ref 0.2–1.3)
BUN SERPL-MCNC: 22 MG/DL (ref 7–30)
CALCIUM SERPL-MCNC: 9.5 MG/DL (ref 8.5–10.1)
CHLORIDE BLD-SCNC: 104 MMOL/L (ref 94–109)
CO2 SERPL-SCNC: 31 MMOL/L (ref 20–32)
CREAT SERPL-MCNC: 1.24 MG/DL (ref 0.52–1.04)
GFR SERPL CREATININE-BSD FRML MDRD: 49 ML/MIN/1.73M2
GLUCOSE BLD-MCNC: 92 MG/DL (ref 70–99)
POTASSIUM BLD-SCNC: 3.9 MMOL/L (ref 3.4–5.3)
PROT SERPL-MCNC: 7.3 G/DL (ref 6.8–8.8)
SODIUM SERPL-SCNC: 140 MMOL/L (ref 133–144)
TSH SERPL DL<=0.005 MIU/L-ACNC: 1.06 MU/L (ref 0.4–4)

## 2022-04-06 PROCEDURE — 80053 COMPREHEN METABOLIC PANEL: CPT

## 2022-04-06 PROCEDURE — 36415 COLL VENOUS BLD VENIPUNCTURE: CPT

## 2022-04-06 PROCEDURE — 84443 ASSAY THYROID STIM HORMONE: CPT

## 2022-04-08 ENCOUNTER — LAB (OUTPATIENT)
Dept: LAB | Facility: CLINIC | Age: 62
End: 2022-04-08
Payer: COMMERCIAL

## 2022-04-08 DIAGNOSIS — I10 ESSENTIAL HYPERTENSION: ICD-10-CM

## 2022-04-08 LAB
ANION GAP SERPL CALCULATED.3IONS-SCNC: 5 MMOL/L (ref 3–14)
BUN SERPL-MCNC: 26 MG/DL (ref 7–30)
CALCIUM SERPL-MCNC: 9.6 MG/DL (ref 8.5–10.1)
CHLORIDE BLD-SCNC: 102 MMOL/L (ref 94–109)
CO2 SERPL-SCNC: 31 MMOL/L (ref 20–32)
CREAT SERPL-MCNC: 1.04 MG/DL (ref 0.52–1.04)
GFR SERPL CREATININE-BSD FRML MDRD: 60 ML/MIN/1.73M2
GLUCOSE BLD-MCNC: 91 MG/DL (ref 70–99)
POTASSIUM BLD-SCNC: 3.6 MMOL/L (ref 3.4–5.3)
SODIUM SERPL-SCNC: 138 MMOL/L (ref 133–144)

## 2022-04-08 PROCEDURE — 36415 COLL VENOUS BLD VENIPUNCTURE: CPT

## 2022-04-08 PROCEDURE — 80048 BASIC METABOLIC PNL TOTAL CA: CPT

## 2022-04-16 ENCOUNTER — HEALTH MAINTENANCE LETTER (OUTPATIENT)
Age: 62
End: 2022-04-16

## 2022-04-30 DIAGNOSIS — K21.9 GASTROESOPHAGEAL REFLUX DISEASE WITHOUT ESOPHAGITIS: ICD-10-CM

## 2022-05-02 RX ORDER — PANTOPRAZOLE SODIUM 40 MG/1
TABLET, DELAYED RELEASE ORAL
Qty: 90 TABLET | Refills: 0 | Status: SHIPPED | OUTPATIENT
Start: 2022-05-02 | End: 2022-08-02

## 2022-05-04 ENCOUNTER — HOSPITAL ENCOUNTER (OUTPATIENT)
Dept: MAMMOGRAPHY | Facility: CLINIC | Age: 62
Discharge: HOME | End: 2022-05-04
Attending: FAMILY MEDICINE | Admitting: FAMILY MEDICINE
Payer: COMMERCIAL

## 2022-05-04 DIAGNOSIS — Z12.31 VISIT FOR SCREENING MAMMOGRAM: ICD-10-CM

## 2022-05-04 PROCEDURE — 77067 SCR MAMMO BI INCL CAD: CPT

## 2022-05-31 ENCOUNTER — OFFICE VISIT (OUTPATIENT)
Dept: DERMATOLOGY | Facility: CLINIC | Age: 62
End: 2022-05-31
Payer: COMMERCIAL

## 2022-05-31 VITALS — SYSTOLIC BLOOD PRESSURE: 123 MMHG | DIASTOLIC BLOOD PRESSURE: 78 MMHG | OXYGEN SATURATION: 96 % | HEART RATE: 80 BPM

## 2022-05-31 DIAGNOSIS — L81.4 LENTIGO: ICD-10-CM

## 2022-05-31 DIAGNOSIS — D18.01 CHERRY ANGIOMA: Primary | ICD-10-CM

## 2022-05-31 DIAGNOSIS — L82.0 INFLAMED SEBORRHEIC KERATOSIS: ICD-10-CM

## 2022-05-31 DIAGNOSIS — D22.9 MULTIPLE BENIGN NEVI: ICD-10-CM

## 2022-05-31 DIAGNOSIS — L82.1 SEBORRHEIC KERATOSIS: ICD-10-CM

## 2022-05-31 PROCEDURE — 17110 DESTRUCTION B9 LES UP TO 14: CPT | Performed by: PHYSICIAN ASSISTANT

## 2022-05-31 PROCEDURE — 99213 OFFICE O/P EST LOW 20 MIN: CPT | Mod: 25 | Performed by: PHYSICIAN ASSISTANT

## 2022-05-31 NOTE — LETTER
2022         RE: Stephanie Trejo   [address]  [facility] [address]        Dear Colleague,    Thank you for referring your patient, Stephanie Trejo, to the [facility]. Please see a copy of my visit note below.    Stephanie Trejo is an extremely pleasant 61 year old year old female patient here today for spot on hairline. Present for a few months, itchy. She notes she has similiar areas around bra line that are itchy. .  Patient has no other skin complaints today.  Remainder of the HPI, Meds, PMH, Allergies, FH, and SH was reviewed in chart.    Pertinent Hx:  No personal history of skin cancer   Past Medical History:   Diagnosis Date     CHF (congestive heart failure) (H) 2013     Depressive disorder      Hypertension      Mitral valve insufficiency, acquired      Other and unspecified disc disorder of lumbar region      Scoliosis (and kyphoscoliosis), idiopathic      Undiagnosed cardiac murmurs      Unspecified hypothyroidism        Past Surgical History:   Procedure Laterality Date     BACK SURGERY       COLONOSCOPY       COLONOSCOPY N/A 2020    Procedure: COLONOSCOPY;  Surgeon: Shaun Torrez MD;  Location: [facility]     ESOPHAGOSCOPY, GASTROSCOPY, DUODENOSCOPY (EGD), COMBINED N/A 2020    Procedure: ESOPHAGOGASTRODUODENOSCOPY, WITH BIOPSY;  Surgeon: Elsie Kincaid MD;  Location: [facility]     GYN SURGERY       SOFT TISSUE SURGERY       SURGICAL HISTORY OF -            SURGICAL HISTORY OF -       Laparoscopy     SURGICAL HISTORY OF -       Hernia Repair      SURGICAL HISTORY OF -       Back Fusion T3 - L3        Family History   Problem Relation Age of Onset     Lipids Mother      Thyroid Disease Mother      Eye Disorder Mother         macular degeneration     Colon Cancer Mother      Anesthesia Reaction Mother         allergic to some     Hypertension Father      Cardiovascular Father         MI     Osteoporosis Father      Alzheimer Disease  Father      Genitourinary Problems Father         incontinence     Breast Cancer Maternal Grandmother      Cerebrovascular Disease Maternal Grandfather      Diabetes Paternal Grandmother      Gastrointestinal Disease Sister         colitis     Respiratory Daughter         asthma       Social History     Socioeconomic History     Marital status:      Spouse name: Not on file     Number of children: Not on file     Years of education: Not on file     Highest education level: Not on file   Occupational History     Not on file   Tobacco Use     Smoking status: Never Smoker     Smokeless tobacco: Never Used   Substance and Sexual Activity     Alcohol use: No     Alcohol/week: 0.0 standard drinks     Drug use: No     Sexual activity: Yes     Partners: Male     Birth control/protection: Post-menopausal   Other Topics Concern     Parent/sibling w/ CABG, MI or angioplasty before 65F 55M? No   Social History Narrative     Not on file     Social Determinants of Health     Financial Resource Strain: Not on file   Food Insecurity: Not on file   Transportation Needs: Not on file   Physical Activity: Not on file   Stress: Not on file   Social Connections: Not on file   Intimate Partner Violence: Not on file   Housing Stability: Not on file       Outpatient Encounter Medications as of 5/31/2022   Medication Sig Dispense Refill     hydrochlorothiazide (HYDRODIURIL) 25 MG tablet TAKE 1 TABLET(25 MG) BY MOUTH DAILY 90 tablet 3     ibuprofen (ADVIL/MOTRIN) 600 MG tablet TK 1 T PO Q 6 H PRF PAIN       levothyroxine (SYNTHROID/LEVOTHROID) 125 MCG tablet TAKE 1 TABLET(125 MCG) BY MOUTH DAILY 90 tablet 3     losartan (COZAAR) 50 MG tablet TAKE 1 TABLET(50 MG) BY MOUTH DAILY 90 tablet 0     pantoprazole (PROTONIX) 40 MG EC tablet TAKE 1  BY MOUTH ONCE DAILY. TAKE 60 MINUTES BEFORE ANY FOOD OR DRINK INTAKE 90 tablet 0     temazepam (RESTORIL) 15 MG capsule TAKE 1 TO 2 CAPSULES BY MOUTH EVERY NIGHT AS NEEDED FOR SLEEP 60 capsule 5      fluticasone (FLONASE) 50 MCG/ACT nasal spray Spray 1 spray into both nostrils daily 16 g 1     No facility-administered encounter medications on file as of 5/31/2022.             O:   NAD, WDWN, Alert & Oriented, Mood & Affect wnl, Vitals stable   Here today alone   /78 (BP Location: Left arm, Patient Position: Sitting, Cuff Size: Adult Large)   Pulse 80   LMP 09/21/2012   SpO2 96%    General appearance normal   Vitals stable   Alert, oriented and in no acute distress     Stuck on papules and brown macules on trunk and ext   Red papules on trunk  Brown papules and macules with regular pigment network and borders on torso and extremities      The remainder of skin exam is normal       Eyes: Conjunctivae/lids:Normal     ENT: Lips normal    MSK:Normal    Cardiovascular: peripheral edema none    Pulm: Breathing Normal    Neuro/Psych: Orientation:Alert and Orientedx3 ; Mood/Affect:normal   A/P:  1. Inflamed seborrheic keratosis on chest, back, left parietal scalp x 10  LN2:  Treated with LN2 for 5s for 1-2 cycles. Warned risks of blistering, pain, pigment change, scarring, and incomplete resolution.  Advised patient to return if lesions do not completely resolve.  Wound care sheet given.  2. Seborrheic keratosis, lentigo, angioma, benign nevi   It was a pleasure speaking to Stephanie Trejo today.  BENIGN LESIONS DISCUSSED WITH PATIENT:  I discussed the specifics of tumor, prognosis, and genetics of benign lesions.  I explained that treatment of these lesions would be purely cosmetic and not medically neccessary.  I discussed with patient different removal options including excision, cautery and /or laser.      Nature and genetics of benign skin lesions dicussed with patient.  Signs and Symptoms of skin cancer discussed with patient.  ABCDEs of melanoma reviewed with patient.  Patient encouraged to perform monthly skin exams.  UV precautions reviewed with patient.  Risks of non-melanoma skin cancer discussed  with patient   Return to clinic in one year or sooner if needed.           Again, thank you for allowing me to participate in the care of your patient.        Sincerely,        Sandy Saleem PA-C

## 2022-05-31 NOTE — PROGRESS NOTES
Stephanie Trejo is an extremely pleasant 61 year old year old female patient here today for spot on hairline. Present for a few months, itchy. She notes she has similiar areas around bra line that are itchy. .  Patient has no other skin complaints today.  Remainder of the HPI, Meds, PMH, Allergies, FH, and SH was reviewed in chart.    Pertinent Hx:  No personal history of skin cancer   Past Medical History:   Diagnosis Date     CHF (congestive heart failure) (H) 2013     Depressive disorder      Hypertension      Mitral valve insufficiency, acquired      Other and unspecified disc disorder of lumbar region      Scoliosis (and kyphoscoliosis), idiopathic      Undiagnosed cardiac murmurs      Unspecified hypothyroidism        Past Surgical History:   Procedure Laterality Date     BACK SURGERY       COLONOSCOPY       COLONOSCOPY N/A 2020    Procedure: COLONOSCOPY;  Surgeon: Shaun Torrez MD;  Location: [facility]     ESOPHAGOSCOPY, GASTROSCOPY, DUODENOSCOPY (EGD), COMBINED N/A 2020    Procedure: ESOPHAGOGASTRODUODENOSCOPY, WITH BIOPSY;  Surgeon: Elsie Kincaid MD;  Location: [facility]     GYN SURGERY       SOFT TISSUE SURGERY       SURGICAL HISTORY OF -            SURGICAL HISTORY OF -       Laparoscopy     SURGICAL HISTORY OF -       Hernia Repair      SURGICAL HISTORY OF -       Back Fusion T3 - L3        Family History   Problem Relation Age of Onset     Lipids Mother      Thyroid Disease Mother      Eye Disorder Mother         macular degeneration     Colon Cancer Mother      Anesthesia Reaction Mother         allergic to some     Hypertension Father      Cardiovascular Father         MI     Osteoporosis Father      Alzheimer Disease Father      Genitourinary Problems Father         incontinence     Breast Cancer Maternal Grandmother      Cerebrovascular Disease Maternal Grandfather      Diabetes Paternal Grandmother      Gastrointestinal Disease Sister         colitis      Respiratory Daughter         asthma       Social History     Socioeconomic History     Marital status:      Spouse name: Not on file     Number of children: Not on file     Years of education: Not on file     Highest education level: Not on file   Occupational History     Not on file   Tobacco Use     Smoking status: Never Smoker     Smokeless tobacco: Never Used   Substance and Sexual Activity     Alcohol use: No     Alcohol/week: 0.0 standard drinks     Drug use: No     Sexual activity: Yes     Partners: Male     Birth control/protection: Post-menopausal   Other Topics Concern     Parent/sibling w/ CABG, MI or angioplasty before 65F 55M? No   Social History Narrative     Not on file     Social Determinants of Health     Financial Resource Strain: Not on file   Food Insecurity: Not on file   Transportation Needs: Not on file   Physical Activity: Not on file   Stress: Not on file   Social Connections: Not on file   Intimate Partner Violence: Not on file   Housing Stability: Not on file       Outpatient Encounter Medications as of 5/31/2022   Medication Sig Dispense Refill     hydrochlorothiazide (HYDRODIURIL) 25 MG tablet TAKE 1 TABLET(25 MG) BY MOUTH DAILY 90 tablet 3     ibuprofen (ADVIL/MOTRIN) 600 MG tablet TK 1 T PO Q 6 H PRF PAIN       levothyroxine (SYNTHROID/LEVOTHROID) 125 MCG tablet TAKE 1 TABLET(125 MCG) BY MOUTH DAILY 90 tablet 3     losartan (COZAAR) 50 MG tablet TAKE 1 TABLET(50 MG) BY MOUTH DAILY 90 tablet 0     pantoprazole (PROTONIX) 40 MG EC tablet TAKE 1  BY MOUTH ONCE DAILY. TAKE 60 MINUTES BEFORE ANY FOOD OR DRINK INTAKE 90 tablet 0     temazepam (RESTORIL) 15 MG capsule TAKE 1 TO 2 CAPSULES BY MOUTH EVERY NIGHT AS NEEDED FOR SLEEP 60 capsule 5     fluticasone (FLONASE) 50 MCG/ACT nasal spray Spray 1 spray into both nostrils daily 16 g 1     No facility-administered encounter medications on file as of 5/31/2022.             O:   NAD, WDWN, Alert & Oriented, Mood & Affect wnl, Vitals  stable   Here today alone   /78 (BP Location: Left arm, Patient Position: Sitting, Cuff Size: Adult Large)   Pulse 80   LMP 09/21/2012   SpO2 96%    General appearance normal   Vitals stable   Alert, oriented and in no acute distress     Stuck on papules and brown macules on trunk and ext   Red papules on trunk  Brown papules and macules with regular pigment network and borders on torso and extremities      The remainder of skin exam is normal       Eyes: Conjunctivae/lids:Normal     ENT: Lips normal    MSK:Normal    Cardiovascular: peripheral edema none    Pulm: Breathing Normal    Neuro/Psych: Orientation:Alert and Orientedx3 ; Mood/Affect:normal   A/P:  1. Inflamed seborrheic keratosis on chest, back, left parietal scalp x 10  LN2:  Treated with LN2 for 5s for 1-2 cycles. Warned risks of blistering, pain, pigment change, scarring, and incomplete resolution.  Advised patient to return if lesions do not completely resolve.  Wound care sheet given.  2. Seborrheic keratosis, lentigo, angioma, benign nevi   It was a pleasure speaking to Stephanie Trejo today.  BENIGN LESIONS DISCUSSED WITH PATIENT:  I discussed the specifics of tumor, prognosis, and genetics of benign lesions.  I explained that treatment of these lesions would be purely cosmetic and not medically neccessary.  I discussed with patient different removal options including excision, cautery and /or laser.      Nature and genetics of benign skin lesions dicussed with patient.  Signs and Symptoms of skin cancer discussed with patient.  ABCDEs of melanoma reviewed with patient.  Patient encouraged to perform monthly skin exams.  UV precautions reviewed with patient.  Risks of non-melanoma skin cancer discussed with patient   Return to clinic in one year or sooner if needed.

## 2022-06-03 DIAGNOSIS — E03.9 HYPOTHYROIDISM, UNSPECIFIED TYPE: ICD-10-CM

## 2022-06-03 DIAGNOSIS — F51.04 PSYCHOPHYSIOLOGICAL INSOMNIA: ICD-10-CM

## 2022-06-03 DIAGNOSIS — F41.9 ANXIETY: ICD-10-CM

## 2022-06-03 DIAGNOSIS — I10 ESSENTIAL HYPERTENSION: ICD-10-CM

## 2022-06-03 RX ORDER — LOSARTAN POTASSIUM 50 MG/1
TABLET ORAL
Qty: 90 TABLET | Refills: 0 | Status: SHIPPED | OUTPATIENT
Start: 2022-06-03 | End: 2022-08-02

## 2022-06-03 RX ORDER — LEVOTHYROXINE SODIUM 125 UG/1
TABLET ORAL
Qty: 90 TABLET | Refills: 0 | Status: SHIPPED | OUTPATIENT
Start: 2022-06-03 | End: 2022-08-02

## 2022-06-03 RX ORDER — TEMAZEPAM 15 MG/1
CAPSULE ORAL
Qty: 60 CAPSULE | Refills: 0 | Status: SHIPPED | OUTPATIENT
Start: 2022-06-03 | End: 2022-08-02

## 2022-06-11 ENCOUNTER — HEALTH MAINTENANCE LETTER (OUTPATIENT)
Age: 62
End: 2022-06-11

## 2022-06-22 ASSESSMENT — PATIENT HEALTH QUESTIONNAIRE - PHQ9
10. IF YOU CHECKED OFF ANY PROBLEMS, HOW DIFFICULT HAVE THESE PROBLEMS MADE IT FOR YOU TO DO YOUR WORK, TAKE CARE OF THINGS AT HOME, OR GET ALONG WITH OTHER PEOPLE: NOT DIFFICULT AT ALL
SUM OF ALL RESPONSES TO PHQ QUESTIONS 1-9: 1
SUM OF ALL RESPONSES TO PHQ QUESTIONS 1-9: 1

## 2022-06-24 ENCOUNTER — OFFICE VISIT (OUTPATIENT)
Dept: FAMILY MEDICINE | Facility: CLINIC | Age: 62
End: 2022-06-24
Payer: COMMERCIAL

## 2022-06-24 VITALS
OXYGEN SATURATION: 98 % | RESPIRATION RATE: 17 BRPM | TEMPERATURE: 97.8 F | BODY MASS INDEX: 28.79 KG/M2 | SYSTOLIC BLOOD PRESSURE: 116 MMHG | HEART RATE: 79 BPM | WEIGHT: 206.4 LBS | DIASTOLIC BLOOD PRESSURE: 69 MMHG

## 2022-06-24 DIAGNOSIS — H61.21 IMPACTED CERUMEN OF RIGHT EAR: Primary | ICD-10-CM

## 2022-06-24 PROCEDURE — 99212 OFFICE O/P EST SF 10 MIN: CPT | Mod: 25 | Performed by: PHYSICIAN ASSISTANT

## 2022-06-24 PROCEDURE — 69209 REMOVE IMPACTED EAR WAX UNI: CPT | Mod: RT | Performed by: PHYSICIAN ASSISTANT

## 2022-06-24 ASSESSMENT — PATIENT HEALTH QUESTIONNAIRE - PHQ9
10. IF YOU CHECKED OFF ANY PROBLEMS, HOW DIFFICULT HAVE THESE PROBLEMS MADE IT FOR YOU TO DO YOUR WORK, TAKE CARE OF THINGS AT HOME, OR GET ALONG WITH OTHER PEOPLE: NOT DIFFICULT AT ALL
SUM OF ALL RESPONSES TO PHQ QUESTIONS 1-9: 1

## 2022-07-26 ASSESSMENT — PATIENT HEALTH QUESTIONNAIRE - PHQ9
10. IF YOU CHECKED OFF ANY PROBLEMS, HOW DIFFICULT HAVE THESE PROBLEMS MADE IT FOR YOU TO DO YOUR WORK, TAKE CARE OF THINGS AT HOME, OR GET ALONG WITH OTHER PEOPLE: NOT DIFFICULT AT ALL
SUM OF ALL RESPONSES TO PHQ QUESTIONS 1-9: 2
SUM OF ALL RESPONSES TO PHQ QUESTIONS 1-9: 2

## 2022-08-02 ENCOUNTER — OFFICE VISIT (OUTPATIENT)
Dept: FAMILY MEDICINE | Facility: CLINIC | Age: 62
End: 2022-08-02
Payer: COMMERCIAL

## 2022-08-02 VITALS
BODY MASS INDEX: 29.2 KG/M2 | OXYGEN SATURATION: 97 % | DIASTOLIC BLOOD PRESSURE: 76 MMHG | HEIGHT: 70 IN | TEMPERATURE: 97.5 F | HEART RATE: 68 BPM | RESPIRATION RATE: 12 BRPM | SYSTOLIC BLOOD PRESSURE: 102 MMHG | WEIGHT: 204 LBS

## 2022-08-02 DIAGNOSIS — Z00.00 ROUTINE GENERAL MEDICAL EXAMINATION AT A HEALTH CARE FACILITY: Primary | ICD-10-CM

## 2022-08-02 DIAGNOSIS — M54.50 CHRONIC BILATERAL LOW BACK PAIN, UNSPECIFIED WHETHER SCIATICA PRESENT: ICD-10-CM

## 2022-08-02 DIAGNOSIS — I10 ESSENTIAL HYPERTENSION: ICD-10-CM

## 2022-08-02 DIAGNOSIS — N18.31 CHRONIC KIDNEY DISEASE, STAGE 3A (H): ICD-10-CM

## 2022-08-02 DIAGNOSIS — F41.9 ANXIETY: ICD-10-CM

## 2022-08-02 DIAGNOSIS — F51.04 PSYCHOPHYSIOLOGICAL INSOMNIA: ICD-10-CM

## 2022-08-02 DIAGNOSIS — G89.29 CHRONIC BILATERAL LOW BACK PAIN, UNSPECIFIED WHETHER SCIATICA PRESENT: ICD-10-CM

## 2022-08-02 DIAGNOSIS — G47.33 OSA (OBSTRUCTIVE SLEEP APNEA): ICD-10-CM

## 2022-08-02 DIAGNOSIS — K21.9 GASTROESOPHAGEAL REFLUX DISEASE WITHOUT ESOPHAGITIS: ICD-10-CM

## 2022-08-02 DIAGNOSIS — E03.9 HYPOTHYROIDISM, UNSPECIFIED TYPE: ICD-10-CM

## 2022-08-02 LAB
ALBUMIN SERPL-MCNC: 3.9 G/DL (ref 3.4–5)
ALP SERPL-CCNC: 99 U/L (ref 40–150)
ALT SERPL W P-5'-P-CCNC: 27 U/L (ref 0–50)
ANION GAP SERPL CALCULATED.3IONS-SCNC: 4 MMOL/L (ref 3–14)
AST SERPL W P-5'-P-CCNC: 25 U/L (ref 0–45)
BILIRUB SERPL-MCNC: 0.7 MG/DL (ref 0.2–1.3)
BUN SERPL-MCNC: 26 MG/DL (ref 7–30)
CALCIUM SERPL-MCNC: 9.5 MG/DL (ref 8.5–10.1)
CHLORIDE BLD-SCNC: 104 MMOL/L (ref 94–109)
CHOLEST SERPL-MCNC: 221 MG/DL
CO2 SERPL-SCNC: 33 MMOL/L (ref 20–32)
CREAT SERPL-MCNC: 1.14 MG/DL (ref 0.52–1.04)
ERYTHROCYTE [DISTWIDTH] IN BLOOD BY AUTOMATED COUNT: 13.2 % (ref 10–15)
FASTING STATUS PATIENT QL REPORTED: YES
GFR SERPL CREATININE-BSD FRML MDRD: 54 ML/MIN/1.73M2
GLUCOSE BLD-MCNC: 96 MG/DL (ref 70–99)
HCT VFR BLD AUTO: 43.5 % (ref 35–47)
HDLC SERPL-MCNC: 59 MG/DL
HGB BLD-MCNC: 14.3 G/DL (ref 11.7–15.7)
LDLC SERPL CALC-MCNC: 140 MG/DL
MCH RBC QN AUTO: 30.8 PG (ref 26.5–33)
MCHC RBC AUTO-ENTMCNC: 32.9 G/DL (ref 31.5–36.5)
MCV RBC AUTO: 94 FL (ref 78–100)
NONHDLC SERPL-MCNC: 162 MG/DL
PLATELET # BLD AUTO: 178 10E3/UL (ref 150–450)
POTASSIUM BLD-SCNC: 3.9 MMOL/L (ref 3.4–5.3)
PROT SERPL-MCNC: 7.8 G/DL (ref 6.8–8.8)
RBC # BLD AUTO: 4.65 10E6/UL (ref 3.8–5.2)
SODIUM SERPL-SCNC: 141 MMOL/L (ref 133–144)
TRIGL SERPL-MCNC: 111 MG/DL
TSH SERPL DL<=0.005 MIU/L-ACNC: 1.57 MU/L (ref 0.4–4)
VIT B12 SERPL-MCNC: 1370 PG/ML (ref 232–1245)
WBC # BLD AUTO: 5.4 10E3/UL (ref 4–11)

## 2022-08-02 PROCEDURE — 80061 LIPID PANEL: CPT | Performed by: FAMILY MEDICINE

## 2022-08-02 PROCEDURE — 82607 VITAMIN B-12: CPT | Performed by: FAMILY MEDICINE

## 2022-08-02 PROCEDURE — 80053 COMPREHEN METABOLIC PANEL: CPT | Performed by: FAMILY MEDICINE

## 2022-08-02 PROCEDURE — 85027 COMPLETE CBC AUTOMATED: CPT | Performed by: FAMILY MEDICINE

## 2022-08-02 PROCEDURE — 36415 COLL VENOUS BLD VENIPUNCTURE: CPT | Performed by: FAMILY MEDICINE

## 2022-08-02 PROCEDURE — 99214 OFFICE O/P EST MOD 30 MIN: CPT | Mod: 25 | Performed by: FAMILY MEDICINE

## 2022-08-02 PROCEDURE — 99396 PREV VISIT EST AGE 40-64: CPT | Performed by: FAMILY MEDICINE

## 2022-08-02 PROCEDURE — 84443 ASSAY THYROID STIM HORMONE: CPT | Performed by: FAMILY MEDICINE

## 2022-08-02 PROCEDURE — 82306 VITAMIN D 25 HYDROXY: CPT | Performed by: FAMILY MEDICINE

## 2022-08-02 RX ORDER — LEVOTHYROXINE SODIUM 125 UG/1
TABLET ORAL
Qty: 90 TABLET | Refills: 3 | Status: SHIPPED | OUTPATIENT
Start: 2022-08-02 | End: 2022-11-17

## 2022-08-02 RX ORDER — CYCLOBENZAPRINE HCL 10 MG
10 TABLET ORAL 3 TIMES DAILY PRN
Qty: 30 TABLET | Refills: 4 | Status: SHIPPED | OUTPATIENT
Start: 2022-08-02

## 2022-08-02 RX ORDER — HYDROCHLOROTHIAZIDE 25 MG/1
25 TABLET ORAL DAILY
Qty: 90 TABLET | Refills: 3 | Status: SHIPPED | OUTPATIENT
Start: 2022-08-02 | End: 2023-09-25

## 2022-08-02 RX ORDER — TEMAZEPAM 15 MG/1
CAPSULE ORAL
Qty: 60 CAPSULE | Refills: 5 | Status: SHIPPED | OUTPATIENT
Start: 2022-08-02 | End: 2023-02-06

## 2022-08-02 RX ORDER — PANTOPRAZOLE SODIUM 40 MG/1
TABLET, DELAYED RELEASE ORAL
Qty: 90 TABLET | Refills: 3 | Status: SHIPPED | OUTPATIENT
Start: 2022-08-02 | End: 2023-08-10

## 2022-08-02 RX ORDER — LOSARTAN POTASSIUM 50 MG/1
50 TABLET ORAL DAILY
Qty: 90 TABLET | Refills: 3 | Status: SHIPPED | OUTPATIENT
Start: 2022-08-02 | End: 2023-09-11

## 2022-08-02 ASSESSMENT — PAIN SCALES - GENERAL: PAINLEVEL: NO PAIN (0)

## 2022-08-02 NOTE — NURSING NOTE
"Chief Complaint   Patient presents with     Hypertension     Thyroid Problem     /76   Pulse 68   Temp 97.5  F (36.4  C) (Tympanic)   Resp 12   Ht 1.772 m (5' 9.75\")   Wt 92.5 kg (204 lb)   LMP 09/21/2012   SpO2 97%   BMI 29.48 kg/m   Estimated body mass index is 29.48 kg/m  as calculated from the following:    Height as of this encounter: 1.772 m (5' 9.75\").    Weight as of this encounter: 92.5 kg (204 lb).  Patient presents to the clinic using No DME      Health Maintenance that is potentially due pending provider review:    Health Maintenance Due   Topic Date Due     HIV SCREENING  Never done     PREVENTIVE CARE VISIT  03/31/2022     COVID-19 Vaccine (4 - Booster for Moderna series) 05/13/2022        Possibly completing today per provider review.        "

## 2022-08-02 NOTE — PATIENT INSTRUCTIONS

## 2022-08-03 LAB — DEPRECATED CALCIDIOL+CALCIFEROL SERPL-MC: 107 UG/L (ref 20–75)

## 2022-08-04 ENCOUNTER — MYC MEDICAL ADVICE (OUTPATIENT)
Dept: FAMILY MEDICINE | Facility: CLINIC | Age: 62
End: 2022-08-04

## 2022-08-04 DIAGNOSIS — I10 ESSENTIAL HYPERTENSION: Primary | ICD-10-CM

## 2022-08-05 PROBLEM — N18.31 CHRONIC KIDNEY DISEASE, STAGE 3A (H): Status: ACTIVE | Noted: 2022-08-05

## 2022-08-05 RX ORDER — CHOLECALCIFEROL (VITAMIN D3) 50 MCG
1 TABLET ORAL DAILY
Qty: 1 TABLET | Refills: 0 | COMMUNITY
Start: 2022-08-05

## 2022-08-05 RX ORDER — CALCIUM CITRATE/VITAMIN D3 200MG-6.25
1 TABLET ORAL DAILY
Qty: 1 TABLET | Refills: 0 | COMMUNITY
Start: 2022-08-05

## 2022-08-05 NOTE — TELEPHONE ENCOUNTER
08-02-22 physical.  Vit D and MVI pended, forwarded to Dr. Felton.   Any other recommendations?  LESLIE Slater RN

## 2022-08-18 ENCOUNTER — TRANSFERRED RECORDS (OUTPATIENT)
Dept: FAMILY MEDICINE | Facility: CLINIC | Age: 62
End: 2022-08-18

## 2022-09-01 ENCOUNTER — MYC MEDICAL ADVICE (OUTPATIENT)
Dept: FAMILY MEDICINE | Facility: CLINIC | Age: 62
End: 2022-09-01

## 2022-09-01 ENCOUNTER — E-VISIT (OUTPATIENT)
Dept: FAMILY MEDICINE | Facility: CLINIC | Age: 62
End: 2022-09-01
Payer: COMMERCIAL

## 2022-09-01 DIAGNOSIS — J20.9 ACUTE BRONCHITIS, UNSPECIFIED ORGANISM: Primary | ICD-10-CM

## 2022-09-01 PROCEDURE — 99421 OL DIG E/M SVC 5-10 MIN: CPT | Performed by: FAMILY MEDICINE

## 2022-09-01 RX ORDER — CODEINE PHOSPHATE AND GUAIFENESIN 10; 100 MG/5ML; MG/5ML
1-2 SOLUTION ORAL EVERY 4 HOURS PRN
Qty: 236 ML | Refills: 0 | Status: SHIPPED | OUTPATIENT
Start: 2022-09-01 | End: 2023-05-15

## 2022-09-01 RX ORDER — AZITHROMYCIN 250 MG/1
TABLET, FILM COATED ORAL
Qty: 6 TABLET | Refills: 0 | Status: SHIPPED | OUTPATIENT
Start: 2022-09-01 | End: 2022-09-06

## 2022-10-22 ENCOUNTER — HEALTH MAINTENANCE LETTER (OUTPATIENT)
Age: 62
End: 2022-10-22

## 2022-11-17 DIAGNOSIS — E03.9 HYPOTHYROIDISM, UNSPECIFIED TYPE: ICD-10-CM

## 2022-11-17 RX ORDER — LEVOTHYROXINE SODIUM 125 UG/1
TABLET ORAL
Qty: 90 TABLET | Refills: 3 | Status: SHIPPED | OUTPATIENT
Start: 2022-11-17 | End: 2023-11-16

## 2022-11-19 NOTE — TELEPHONE ENCOUNTER
Call pt I have not seen her in the office in >1 year needs appt and I will fill to appt   
Patient scheduled for 8/2/2022, she would like to know to have her Labs completed?    Hoa Savage RN     
Requested Prescriptions   Pending Prescriptions Disp Refills     temazepam (RESTORIL) 15 MG capsule [Pharmacy Med Name: Temazepam 15 MG Oral Capsule] 60 capsule 0     Sig: TAKE 1 TO 2 CAPSULES BY MOUTH AT BEDTIME AS NEEDED FOR SLEEP       There is no refill protocol information for this order      Last Written Prescription Date:  11/2/21  Last Fill Quantity: 60,  # refills: 5   Last office visit: 3/31/2021 with prescribing provider:     Future Office Visit:            
Rx filled and labs ordered   
Scheduled  Enid Bradley on 6/3/2022 at 4:43 PM      
d  
Standing/Walking/Toileting/Moving from bed to chair

## 2022-12-13 ENCOUNTER — IMMUNIZATION (OUTPATIENT)
Dept: FAMILY MEDICINE | Facility: CLINIC | Age: 62
End: 2022-12-13
Payer: COMMERCIAL

## 2022-12-13 DIAGNOSIS — Z23 HIGH PRIORITY FOR 2019-NCOV VACCINE: Primary | ICD-10-CM

## 2022-12-13 PROCEDURE — 91312 COVID-19 VACCINE BIVALENT BOOSTER 12+ (PFIZER): CPT

## 2022-12-13 PROCEDURE — 90682 RIV4 VACC RECOMBINANT DNA IM: CPT

## 2022-12-13 PROCEDURE — 0124A COVID-19 VACCINE BIVALENT BOOSTER 12+ (PFIZER): CPT

## 2022-12-13 PROCEDURE — 90471 IMMUNIZATION ADMIN: CPT

## 2023-02-02 ENCOUNTER — LAB (OUTPATIENT)
Dept: LAB | Facility: CLINIC | Age: 63
End: 2023-02-02
Payer: COMMERCIAL

## 2023-02-02 DIAGNOSIS — I10 ESSENTIAL HYPERTENSION: ICD-10-CM

## 2023-02-02 DIAGNOSIS — N18.31 CHRONIC KIDNEY DISEASE, STAGE 3A (H): Primary | ICD-10-CM

## 2023-02-02 LAB
ANION GAP SERPL CALCULATED.3IONS-SCNC: 7 MMOL/L (ref 7–15)
BUN SERPL-MCNC: 21.2 MG/DL (ref 8–23)
CALCIUM SERPL-MCNC: 9.8 MG/DL (ref 8.8–10.2)
CHLORIDE SERPL-SCNC: 103 MMOL/L (ref 98–107)
CREAT SERPL-MCNC: 1.07 MG/DL (ref 0.51–0.95)
CREAT UR-MCNC: 188.5 MG/DL
DEPRECATED HCO3 PLAS-SCNC: 35 MMOL/L (ref 22–29)
GFR SERPL CREATININE-BSD FRML MDRD: 58 ML/MIN/1.73M2
GLUCOSE SERPL-MCNC: 95 MG/DL (ref 70–99)
MICROALBUMIN UR-MCNC: 27.8 MG/L
MICROALBUMIN/CREAT UR: 14.75 MG/G CR (ref 0–25)
POTASSIUM SERPL-SCNC: 3.7 MMOL/L (ref 3.4–5.3)
SODIUM SERPL-SCNC: 145 MMOL/L (ref 136–145)
VIT B12 SERPL-MCNC: 1259 PG/ML (ref 232–1245)

## 2023-02-02 PROCEDURE — 82306 VITAMIN D 25 HYDROXY: CPT

## 2023-02-02 PROCEDURE — 80048 BASIC METABOLIC PNL TOTAL CA: CPT

## 2023-02-02 PROCEDURE — 82043 UR ALBUMIN QUANTITATIVE: CPT

## 2023-02-02 PROCEDURE — 36415 COLL VENOUS BLD VENIPUNCTURE: CPT

## 2023-02-02 PROCEDURE — 82607 VITAMIN B-12: CPT

## 2023-02-02 PROCEDURE — 82570 ASSAY OF URINE CREATININE: CPT

## 2023-02-03 LAB — DEPRECATED CALCIDIOL+CALCIFEROL SERPL-MC: 98 UG/L (ref 20–75)

## 2023-02-06 DIAGNOSIS — F51.04 PSYCHOPHYSIOLOGICAL INSOMNIA: ICD-10-CM

## 2023-02-06 DIAGNOSIS — F41.9 ANXIETY: ICD-10-CM

## 2023-02-06 RX ORDER — TEMAZEPAM 15 MG/1
CAPSULE ORAL
Qty: 60 CAPSULE | Refills: 0 | Status: SHIPPED | OUTPATIENT
Start: 2023-02-06 | End: 2023-04-03

## 2023-02-07 ENCOUNTER — MYC MEDICAL ADVICE (OUTPATIENT)
Dept: FAMILY MEDICINE | Facility: CLINIC | Age: 63
End: 2023-02-07
Payer: COMMERCIAL

## 2023-03-12 ENCOUNTER — MYC MEDICAL ADVICE (OUTPATIENT)
Dept: FAMILY MEDICINE | Facility: CLINIC | Age: 63
End: 2023-03-12
Payer: COMMERCIAL

## 2023-03-30 ENCOUNTER — HOSPITAL ENCOUNTER (EMERGENCY)
Facility: CLINIC | Age: 63
Discharge: HOME | End: 2023-03-30
Attending: PHYSICIAN ASSISTANT | Admitting: PHYSICIAN ASSISTANT
Payer: COMMERCIAL

## 2023-03-30 ENCOUNTER — APPOINTMENT (OUTPATIENT)
Dept: GENERAL RADIOLOGY | Facility: CLINIC | Age: 63
End: 2023-03-30
Attending: PHYSICIAN ASSISTANT
Payer: COMMERCIAL

## 2023-03-30 VITALS
OXYGEN SATURATION: 97 % | SYSTOLIC BLOOD PRESSURE: 145 MMHG | RESPIRATION RATE: 24 BRPM | HEART RATE: 83 BPM | TEMPERATURE: 97.3 F | DIASTOLIC BLOOD PRESSURE: 96 MMHG

## 2023-03-30 DIAGNOSIS — J40 BRONCHITIS: ICD-10-CM

## 2023-03-30 PROCEDURE — G0463 HOSPITAL OUTPT CLINIC VISIT: HCPCS | Mod: 25 | Performed by: PHYSICIAN ASSISTANT

## 2023-03-30 PROCEDURE — 71046 X-RAY EXAM CHEST 2 VIEWS: CPT

## 2023-03-30 PROCEDURE — 99213 OFFICE O/P EST LOW 20 MIN: CPT | Performed by: PHYSICIAN ASSISTANT

## 2023-03-30 RX ORDER — PREDNISONE 20 MG/1
TABLET ORAL
Qty: 10 TABLET | Refills: 0 | Status: SHIPPED | OUTPATIENT
Start: 2023-03-30 | End: 2023-05-15

## 2023-03-30 NOTE — ED PROVIDER NOTES
History     Chief Complaint   Patient presents with     Cough     For a week     Shortness of Breath     Fever     Over the weekend      HPI  Stephanie Trejo is a 62 year old female presents history significant for hypertension, mitral valve regurgitation, chronic kidney disease, hypothyroidism, obstructive sleep apnea who presents to the urgent care with concern over cough, chest tightness, shortness of breath.  Patient initially became ill approximately 1 week ago with cough, some episodes of loose stool.  She did have a fever up to 100.6 which has since resolved.  She states that she was feeling better until her chest tightness, shortness of breath and wheezing worsened over the last couple of days.  She denies any known history of asthma, COPD however states that she has had a history of frequent pneumonia.  She denies any smoking history.     Allergies:  Allergies   Allergen Reactions     Amlodipine Besylate Swelling     Swelling in ankles, Racing pulse also     Ammonia Hives and Swelling     Lisinopril      Dizziness and headaches     Penicillins Rash       Problem List:    Patient Active Problem List    Diagnosis Date Noted     Chronic kidney disease, stage 3a (H) 08/05/2022     Priority: Medium     Anxiety 12/04/2017     Priority: Medium     Counseling regarding advanced directives 07/28/2015     Priority: Medium     Information given to patient       Mild major depression (H) 04/21/2013     Priority: Medium     Abnormal uterine bleeding 12/05/2012     Priority: Medium     Stopped BCP 9/12; small period 10/12 and none since;  sono 11/12--fluid and debris in cavity; stripe 2mm    EMB--brown fluid returned with scant benign endometrium    Advised to call if recurrent bleeding; repeat sono in 4-6 mons for f/u       Mitral valve regurgitation 06/28/2011     Priority: Medium     CAMELIA (obstructive sleep apnea) 12/11/2009     Priority: Medium     1) Dx 2006, struggled with CPAP, stopped CPAP after weight loss,  now weight gain and recurrence of symptoms. Willing to try CPAP (but barrier wearing device). Mandibular advancement may not address nasal issues.   2) Sleep Study 1/6/10: Sleep latency 20 minutes without Ambien.  REM achieved.   REM latency 181.5 minutes.  Sleep efficiency 84.9%. Total sleep time 344 minutes. Sleep architecture:  Stage 1, 17.7% (5%), stage 2, 55.5% (45-55%), stage 3, 6.4% (15-20%), stage REM, 20.3% (20-25%).  Using rule 4B, AHI was 8.4, with significant desaturations down to 87%. RDI 16.7/h.  REM RDI 32.6, consistent with severe REM CAMELIA.  Supine RDI 16.7/h - stayed on back, consistent with moderate SUPINE CAMELIA.  Periodic Limb Movement Index 18.8/hour, PLMAI was 8.5/h. Pt  had back pain, often moves then the right leg first, then also left if not better.   3) Start Auto-CPAP       Essential hypertension 2006     Priority: Medium     Problem list name updated by automated process. Provider to review       Hypothyroidism 2005     Priority: Medium     Problem list name updated by automated process. Provider to review          Past Medical History:    Past Medical History:   Diagnosis Date     CHF (congestive heart failure) (H) 2013     Depressive disorder      Hypertension      Mitral valve insufficiency, acquired      Other and unspecified disc disorder of lumbar region      Scoliosis (and kyphoscoliosis), idiopathic      Undiagnosed cardiac murmurs      Unspecified hypothyroidism        Past Surgical History:    Past Surgical History:   Procedure Laterality Date     BACK SURGERY       COLONOSCOPY       COLONOSCOPY N/A 2020    Procedure: COLONOSCOPY;  Surgeon: Shaun Torrez MD;  Location: [facility]     ESOPHAGOSCOPY, GASTROSCOPY, DUODENOSCOPY (EGD), COMBINED N/A 2020    Procedure: ESOPHAGOGASTRODUODENOSCOPY, WITH BIOPSY;  Surgeon: Elsie Kincaid MD;  Location: [facility]     GYN SURGERY       SOFT TISSUE SURGERY       SURGICAL HISTORY OF -            SURGICAL HISTORY  OF -       Laparoscopy     SURGICAL HISTORY OF -   1983    Hernia Repair      SURGICAL HISTORY OF -   01/98    Back Fusion T3 - L3       Family History:    Family History   Problem Relation Age of Onset     Lipids Mother      Thyroid Disease Mother      Eye Disorder Mother         macular degeneration     Colon Cancer Mother      Anesthesia Reaction Mother         allergic to some     Hypertension Father      Cardiovascular Father         MI     Osteoporosis Father      Alzheimer Disease Father      Genitourinary Problems Father         incontinence     Breast Cancer Maternal Grandmother      Cerebrovascular Disease Maternal Grandfather      Diabetes Paternal Grandmother      Gastrointestinal Disease Sister         colitis     Respiratory Daughter         asthma       Social History:  Marital Status:   [2]  Social History     Tobacco Use     Smoking status: Never     Smokeless tobacco: Never   Substance Use Topics     Alcohol use: No     Alcohol/week: 0.0 standard drinks     Drug use: No        Medications:    Apoaequorin (PREVAGEN EXTRA STRENGTH) 20 MG CAPS  cyclobenzaprine (FLEXERIL) 10 MG tablet  guaiFENesin-codeine (ROBITUSSIN AC) 100-10 MG/5ML solution  hydrochlorothiazide (HYDRODIURIL) 25 MG tablet  ibuprofen (ADVIL/MOTRIN) 600 MG tablet  levothyroxine (SYNTHROID/LEVOTHROID) 125 MCG tablet  losartan (COZAAR) 50 MG tablet  Multiple Vitamins-Minerals (MULTIVITAMIN GUMMIES WOMENS) CHEW  pantoprazole (PROTONIX) 40 MG EC tablet  temazepam (RESTORIL) 15 MG capsule  vitamin D3 (CHOLECALCIFEROL) 50 mcg (2000 units) tablet      Review of Systems  CONSTITUTIONAL:POSITIVE  for improved fever  and NEGATIVE  for chills, myalgias   INTEGUMENTARY/SKIN: NEGATIVE for worrisome rashes, moles or lesions  EYES: NEGATIVE for vision changes or irritation  ENT/MOUTH: NEGATIVE for ear, mouth and throat problems  RESP:POSITIVE for cough, chest tightness, shortness of breath, wheezing  GI: POSITIVE for diarrhea NEGATIVE for  nausea, vomiting, abdominal pain   Physical Exam   BP: (!) 145/96  Pulse: 83  Temp: 97.3  F (36.3  C)  Resp: 24  SpO2: 97 %  Physical Exam  GENERAL APPEARANCE: alert, cooperative   EYES: EOMI,  PERRL, conjunctiva clear  HENT: ear canals and TM's normal.  Nose and mouth without ulcers, erythema or lesions  NECK: supple, nontender, no lymphadenopathy  RESP: inspiratory/expiratory wheezing bilaterally  CV: regular rates and rhythm, normal S1 S2, no murmur noted  SKIN: no suspicious lesions or rashes  ED Course           Procedures       Critical Care time:  none        Results for orders placed or performed during the hospital encounter of 03/30/23 (from the past 24 hour(s))   Chest XR,  PA & LAT    Narrative    EXAM: XR CHEST 2 VIEWS  LOCATION: [facility]  DATE/TIME: 3/30/2023 6:15 PM    INDICATION: cough, shortness of breath, wheezing, assess for pneumonia  COMPARISON: 11/22/2021      Impression    IMPRESSION: Stable mild cardiomegaly. Stable elevation of the right hemidiaphragm. Airspace changes within the right lower lung which may be due to subsegmental atelectasis versus fibrosis. Mild prominence of the pulmonary vascularity suggesting vascular   congestion. No new infiltrates. Extensive postoperative changes of the visualized spine.     Medications - No data to display    Assessments & Plan (with Medical Decision Making)     I have reviewed the nursing notes.  I have reviewed the findings, diagnosis, plan and need for follow up with the patient.     Medical Decision Making  The patient's presentation was of low complexity (an acute and uncomplicated illness or injury).    The patient's evaluation involved:  ordering and/or review of 1 test(s) in this encounter (see separate area of note for details)    The patient's management necessitated moderate risk (prescription drug management including medications given in the ED).    New Prescriptions    No medications on file     Final  diagnoses:   None     62-year-old female presents to urgent care with concern over 6-day history of cough, chest tightness, shortness of breath with improved fever.  She had elevated blood pressure upon arrival, remainder vital signs stable.  Physical exam findings were significant for inspiratory expiratory wheezing.  She had chest x-ray which is negative for acute infiltrate did show stable mild cardiomegaly, stable elevation of the right hemidiaphragm.  Airspace changes within the right lower lung which may be due to subsegmental atelectasis versus fibrosis.  Mild prominence of the pulmonary vascularity suggesting vascular congestion.  Extensive postoperative changes of the visualized spine.  I did discuss potential COVID-19 given symptoms occurred during global pandemic and her complaints we discussed risks/benefits of testing for viral illnesses.   However,  given duration of symptoms, absence of treatment options patient declined at this time.  Differential for symptoms include bronchitis.  I have low suspicion for pneumonia, pertussis, PE, MI/ACS,CHF at this time, however did discuss these as potential etiologies and patient agreed to defer further evaluation.  She was discharged home stable with prescription for prednisone.  follow up with PCP if no improvement in 48-72 hours  worrisome reasons to return to ER/UC sooner discussed.     Disclaimer: This note consists of symbols derived from keyboarding, dictation, and/or voice recognition software. As a result, there may be errors in the script that have gone undetected.  Please consider this when interpreting information found in the chart.    3/30/2023   [facility] EMERGENCY DEPT     Nella Medrano PA-C  03/30/23 [id number]

## 2023-04-01 DIAGNOSIS — F41.9 ANXIETY: ICD-10-CM

## 2023-04-01 DIAGNOSIS — F51.04 PSYCHOPHYSIOLOGICAL INSOMNIA: ICD-10-CM

## 2023-04-03 RX ORDER — TEMAZEPAM 15 MG/1
15-30 CAPSULE ORAL
Qty: 60 CAPSULE | Refills: 0 | Status: SHIPPED | OUTPATIENT
Start: 2023-04-03 | End: 2023-05-03

## 2023-05-03 DIAGNOSIS — F41.9 ANXIETY: ICD-10-CM

## 2023-05-03 DIAGNOSIS — F51.04 PSYCHOPHYSIOLOGICAL INSOMNIA: ICD-10-CM

## 2023-05-03 RX ORDER — TEMAZEPAM 15 MG/1
15-30 CAPSULE ORAL
Qty: 60 CAPSULE | Refills: 0 | Status: SHIPPED | OUTPATIENT
Start: 2023-05-03 | End: 2023-07-03

## 2023-05-15 ENCOUNTER — OFFICE VISIT (OUTPATIENT)
Dept: PODIATRY | Facility: CLINIC | Age: 63
End: 2023-05-15
Payer: COMMERCIAL

## 2023-05-15 VITALS
DIASTOLIC BLOOD PRESSURE: 81 MMHG | BODY MASS INDEX: 28.63 KG/M2 | OXYGEN SATURATION: 95 % | SYSTOLIC BLOOD PRESSURE: 146 MMHG | HEIGHT: 70 IN | HEART RATE: 58 BPM | WEIGHT: 200 LBS

## 2023-05-15 DIAGNOSIS — M77.52 CAPSULITIS OF METATARSOPHALANGEAL (MTP) JOINT OF LEFT FOOT: ICD-10-CM

## 2023-05-15 DIAGNOSIS — M54.10 RADICULOPATHY WITH LOWER EXTREMITY SYMPTOMS: Primary | ICD-10-CM

## 2023-05-15 DIAGNOSIS — M77.51 CAPSULITIS OF METATARSOPHALANGEAL (MTP) JOINT OF RIGHT FOOT: ICD-10-CM

## 2023-05-15 PROCEDURE — 99203 OFFICE O/P NEW LOW 30 MIN: CPT | Performed by: PODIATRIST

## 2023-05-15 ASSESSMENT — PAIN SCALES - GENERAL: PAINLEVEL: MODERATE PAIN (5)

## 2023-05-15 NOTE — PROGRESS NOTES
PATIENT HISTORY:  Stephanie Trejo is a 62 year old female who presents to clinic as a self referral with a chief complaint of bilateral feet numbness.  The patient is seen by themselves.  The patient relates the pain is primarily located around the [pad of the feet.  Reports insidious onset without acute precipitating event.  The patient relates that the symptoms have been going on for several month(s).  The patient has previously tried different shoes, and advil with little relief.  The patient is retired.  Any previous notes and studies that pertain to the patient's condition were reviewed.    Pertinent medical, surgical and family history was reviewed in the [facility] chart.    Past Medical History:   Past Medical History:   Diagnosis Date     CHF (congestive heart failure) (H) 07/08/2013     Depressive disorder      Hypertension      Mitral valve insufficiency, acquired      Other and unspecified disc disorder of lumbar region      Scoliosis (and kyphoscoliosis), idiopathic      Undiagnosed cardiac murmurs      Unspecified hypothyroidism        Medications:   Current Outpatient Medications:      Apoaequorin (PREVAGEN EXTRA STRENGTH) 20 MG CAPS, Take 1 tablet by mouth daily, Disp: 100 capsule, Rfl: 3     cyclobenzaprine (FLEXERIL) 10 MG tablet, Take 1 tablet (10 mg) by mouth 3 times daily as needed for muscle spasms, Disp: 30 tablet, Rfl: 4     hydrochlorothiazide (HYDRODIURIL) 25 MG tablet, Take 1 tablet (25 mg) by mouth daily, Disp: 90 tablet, Rfl: 3     ibuprofen (ADVIL/MOTRIN) 600 MG tablet, , Disp: , Rfl:      levothyroxine (SYNTHROID/LEVOTHROID) 125 MCG tablet, TAKE 1 TABLET BY MOUTH ONCE DAILY ., Disp: 90 tablet, Rfl: 3     losartan (COZAAR) 50 MG tablet, Take 1 tablet (50 mg) by mouth daily, Disp: 90 tablet, Rfl: 3     Multiple Vitamins-Minerals (MULTIVITAMIN GUMMIES WOMENS) CHEW, Take 1 tablet by mouth daily, Disp: 1 tablet, Rfl: 0     pantoprazole (PROTONIX) 40 MG EC tablet, TAKE 1  BY MOUTH ONCE DAILY.  "TAKE 60 MINUTES BEFORE ANY FOOD OR DRINK INTAKE, Disp: 90 tablet, Rfl: 3     temazepam (RESTORIL) 15 MG capsule, Take 1-2 capsules (15-30 mg) by mouth nightly as needed for sleep, Disp: 60 capsule, Rfl: 0     vitamin D3 (CHOLECALCIFEROL) 50 mcg (2000 units) tablet, Take 1 tablet (50 mcg) by mouth daily, Disp: 1 tablet, Rfl: 0     Allergies:    Allergies   Allergen Reactions     Amlodipine Besylate Swelling     Swelling in ankles, Racing pulse also     Ammonia Hives and Swelling     Lisinopril      Dizziness and headaches     Penicillins Rash       Vitals: BP (!) 146/81   Pulse 58   Ht 1.772 m (5' 9.75\")   Wt 90.7 kg (200 lb)   LMP 09/21/2012   SpO2 95%   BMI 28.90 kg/m    BMI= Body mass index is 28.9 kg/m .    LOWER EXTREMITY PHYSICAL EXAM    Dermatologic: Skin is intact to right and left lower extremity without significant lesions, rash or abrasion.        Vascular: DP & PT pulses are intact & regular on the right and left.   CFT and skin temperature is normal to the right and left lower extremity.     Neurologic: Lower extremity sensation is intact to light touch.  No evidence of weakness in the right and left lower extremity.   Noted positive straight leg raise exam bilaterally.     Musculoskeletal: Patient is ambulatory without assistive device or brace.  No gross ankle deformity noted.  No foot or ankle joint effusion is noted.  Noted pain on palpation under the lesser metatarsophalangeal joints bilaterally.   No erythema noted.           ASSESSMENT / PLAN:     ICD-10-CM    1. Radiculopathy with lower extremity symptoms  M54.10       2. Capsulitis of metatarsophalangeal (MTP) joint of left foot  M77.52 Ankle/Foot Bracing Supplies DME Foot Insert; Left, Right      3. Capsulitis of metatarsophalangeal (MTP) joint of right foot  M77.51 Ankle/Foot Bracing Supplies DME Foot Insert; Left, Right          I have explained to Stephanie about the conditions.  We discussed the underlying contributing factors to the " condition as well as both conservative and surgical treatment options along with expected length of recovery.  At this time, the patient was educated on the importance of offloading supportive shoes and other devices.  I demonstrated to the patient calf stretches to perform every hour daily until symptoms resolve.  After symptoms resolve, the patient was advised to perform the stretches 3 times daily to prevent future recurrence.  The patient was instructed to perform warm soaks with Epson salt after which to also apply over-the-counter Voltaren gel to deeply massage the injured tissue.  The patient was instructed to do this on a daily basis until symptoms resolve.  The patient was fitted with  Dynaflex inserts that will aid in offloading the tension forces to the soft tissues and prevent further inflammation.  The patient was recommended to follow up with her spine surgeon for further evaluation of the lower lumbar vertebrae.  The patient may return in four weeks for reevaluation to determine if any further treatment will be needed.        Stephanie verbalized agreement with and understanding of the rational for the diagnosis and treatment plan.  All questions were answered to best of my ability and the patient's satisfaction. The patient was advised to contact the clinic with any questions that may arise after the clinic visit.      Disclaimer: This note consists of symbols derived from keyboarding, dictation and/or voice recognition software. As a result, there may be errors in the script that have gone undetected. Please consider this when interpreting information found in this chart.       OSIEL Quintero D.P.M., EDITH.F.A.S.

## 2023-05-15 NOTE — NURSING NOTE
"Chief Complaint   Patient presents with     Consult     Feet numbness       Initial BP (!) 146/81   Pulse 58   Ht 1.772 m (5' 9.75\")   Wt 90.7 kg (200 lb)   LMP 09/21/2012   SpO2 95%   BMI 28.90 kg/m   Estimated body mass index is 28.9 kg/m  as calculated from the following:    Height as of this encounter: 1.772 m (5' 9.75\").    Weight as of this encounter: 90.7 kg (200 lb).  Medications and allergies reviewed.      Inez COY MA    "

## 2023-05-15 NOTE — PATIENT INSTRUCTIONS

## 2023-05-15 NOTE — LETTER
5/15/2023         RE: Stephanie Trejo  [address]  [place] [address]        Dear Colleague,    Thank you for referring your patient, Stephanie Trejo, to the [facility] [place]. Please see a copy of my visit note below.    PATIENT HISTORY:  Stephanie Trejo is a 62 year old female who presents to clinic as a self referral with a chief complaint of bilateral feet numbness.  The patient is seen by themselves.  The patient relates the pain is primarily located around the [pad of the feet.  Reports insidious onset without acute precipitating event.  The patient relates that the symptoms have been going on for several month(s).  The patient has previously tried different shoes, and advil with little relief.  The patient is retired.  Any previous notes and studies that pertain to the patient's condition were reviewed.    Pertinent medical, surgical and family history was reviewed in the Epic chart.    Past Medical History:   Past Medical History:   Diagnosis Date     CHF (congestive heart failure) (H) 07/08/2013     Depressive disorder      Hypertension      Mitral valve insufficiency, acquired      Other and unspecified disc disorder of lumbar region      Scoliosis (and kyphoscoliosis), idiopathic      Undiagnosed cardiac murmurs      Unspecified hypothyroidism        Medications:   Current Outpatient Medications:      Apoaequorin (PREVAGEN EXTRA STRENGTH) 20 MG CAPS, Take 1 tablet by mouth daily, Disp: 100 capsule, Rfl: 3     cyclobenzaprine (FLEXERIL) 10 MG tablet, Take 1 tablet (10 mg) by mouth 3 times daily as needed for muscle spasms, Disp: 30 tablet, Rfl: 4     hydrochlorothiazide (HYDRODIURIL) 25 MG tablet, Take 1 tablet (25 mg) by mouth daily, Disp: 90 tablet, Rfl: 3     ibuprofen (ADVIL/MOTRIN) 600 MG tablet, , Disp: , Rfl:      levothyroxine (SYNTHROID/LEVOTHROID) 125 MCG tablet, TAKE 1 TABLET BY MOUTH ONCE DAILY ., Disp: 90 tablet, Rfl: 3     losartan (COZAAR) 50 MG  "tablet, Take 1 tablet (50 mg) by mouth daily, Disp: 90 tablet, Rfl: 3     Multiple Vitamins-Minerals (MULTIVITAMIN GUMMIES WOMENS) CHEW, Take 1 tablet by mouth daily, Disp: 1 tablet, Rfl: 0     pantoprazole (PROTONIX) 40 MG EC tablet, TAKE 1  BY MOUTH ONCE DAILY. TAKE 60 MINUTES BEFORE ANY FOOD OR DRINK INTAKE, Disp: 90 tablet, Rfl: 3     temazepam (RESTORIL) 15 MG capsule, Take 1-2 capsules (15-30 mg) by mouth nightly as needed for sleep, Disp: 60 capsule, Rfl: 0     vitamin D3 (CHOLECALCIFEROL) 50 mcg (2000 units) tablet, Take 1 tablet (50 mcg) by mouth daily, Disp: 1 tablet, Rfl: 0     Allergies:    Allergies   Allergen Reactions     Amlodipine Besylate Swelling     Swelling in ankles, Racing pulse also     Ammonia Hives and Swelling     Lisinopril      Dizziness and headaches     Penicillins Rash       Vitals: BP (!) 146/81   Pulse 58   Ht 1.772 m (5' 9.75\")   Wt 90.7 kg (200 lb)   LMP 09/21/2012   SpO2 95%   BMI 28.90 kg/m    BMI= Body mass index is 28.9 kg/m .    LOWER EXTREMITY PHYSICAL EXAM    Dermatologic: Skin is intact to right and left lower extremity without significant lesions, rash or abrasion.        Vascular: DP & PT pulses are intact & regular on the right and left.   CFT and skin temperature is normal to the right and left lower extremity.     Neurologic: Lower extremity sensation is intact to light touch.  No evidence of weakness in the right and left lower extremity.   Noted positive straight leg raise exam bilaterally.     Musculoskeletal: Patient is ambulatory without assistive device or brace.  No gross ankle deformity noted.  No foot or ankle joint effusion is noted.  Noted pain on palpation under the lesser metatarsophalangeal joints bilaterally.   No erythema noted.           ASSESSMENT / PLAN:     ICD-10-CM    1. Radiculopathy with lower extremity symptoms  M54.10       2. Capsulitis of metatarsophalangeal (MTP) joint of left foot  M77.52 Ankle/Foot Bracing Supplies DME Foot Insert; " Left, Right      3. Capsulitis of metatarsophalangeal (MTP) joint of right foot  M77.51 Ankle/Foot Bracing Supplies DME Foot Insert; Left, Right          I have explained to Stephanie about the conditions.  We discussed the underlying contributing factors to the condition as well as both conservative and surgical treatment options along with expected length of recovery.  At this time, the patient was educated on the importance of offloading supportive shoes and other devices.  I demonstrated to the patient calf stretches to perform every hour daily until symptoms resolve.  After symptoms resolve, the patient was advised to perform the stretches 3 times daily to prevent future recurrence.  The patient was instructed to perform warm soaks with Epson salt after which to also apply over-the-counter Voltaren gel to deeply massage the injured tissue.  The patient was instructed to do this on a daily basis until symptoms resolve.  The patient was fitted with  Dynaflex inserts that will aid in offloading the tension forces to the soft tissues and prevent further inflammation.  The patient was recommended to follow up with her spine surgeon for further evaluation of the lower lumbar vertebrae.  The patient may return in four weeks for reevaluation to determine if any further treatment will be needed.        Stephanie verbalized agreement with and understanding of the rational for the diagnosis and treatment plan.  All questions were answered to best of my ability and the patient's satisfaction. The patient was advised to contact the clinic with any questions that may arise after the clinic visit.      Disclaimer: This note consists of symbols derived from keyboarding, dictation and/or voice recognition software. As a result, there may be errors in the script that have gone undetected. Please consider this when interpreting information found in this chart.       OSIEL Quintero D.P.M., F.A.C.F.A.S.        Again, thank you for  allowing me to participate in the care of your patient.        Sincerely,        Hany Quintero DPM

## 2023-05-30 ENCOUNTER — HOSPITAL ENCOUNTER (OUTPATIENT)
Dept: MAMMOGRAPHY | Facility: CLINIC | Age: 63
Discharge: HOME | End: 2023-05-30
Attending: FAMILY MEDICINE | Admitting: FAMILY MEDICINE
Payer: COMMERCIAL

## 2023-05-30 DIAGNOSIS — Z12.31 VISIT FOR SCREENING MAMMOGRAM: ICD-10-CM

## 2023-05-30 PROCEDURE — 77067 SCR MAMMO BI INCL CAD: CPT

## 2023-06-08 ENCOUNTER — OFFICE VISIT (OUTPATIENT)
Dept: DERMATOLOGY | Facility: CLINIC | Age: 63
End: 2023-06-08
Payer: COMMERCIAL

## 2023-06-08 DIAGNOSIS — D48.5 NEOPLASM OF UNCERTAIN BEHAVIOR OF SKIN: ICD-10-CM

## 2023-06-08 DIAGNOSIS — D18.01 CHERRY ANGIOMA: Primary | ICD-10-CM

## 2023-06-08 DIAGNOSIS — L82.1 SEBORRHEIC KERATOSIS: ICD-10-CM

## 2023-06-08 DIAGNOSIS — L82.0 INFLAMED SEBORRHEIC KERATOSIS: ICD-10-CM

## 2023-06-08 DIAGNOSIS — D22.9 MULTIPLE BENIGN NEVI: ICD-10-CM

## 2023-06-08 DIAGNOSIS — L81.4 LENTIGO: ICD-10-CM

## 2023-06-08 PROCEDURE — 11102 TANGNTL BX SKIN SINGLE LES: CPT | Mod: 59 | Performed by: PHYSICIAN ASSISTANT

## 2023-06-08 PROCEDURE — 99213 OFFICE O/P EST LOW 20 MIN: CPT | Mod: 25 | Performed by: PHYSICIAN ASSISTANT

## 2023-06-08 PROCEDURE — 11103 TANGNTL BX SKIN EA SEP/ADDL: CPT | Mod: 59 | Performed by: PHYSICIAN ASSISTANT

## 2023-06-08 PROCEDURE — 17110 DESTRUCTION B9 LES UP TO 14: CPT | Performed by: PHYSICIAN ASSISTANT

## 2023-06-08 PROCEDURE — 88305 TISSUE EXAM BY PATHOLOGIST: CPT | Performed by: DERMATOLOGY

## 2023-06-08 ASSESSMENT — PAIN SCALES - GENERAL: PAINLEVEL: NO PAIN (0)

## 2023-06-08 NOTE — PROGRESS NOTES
Stephanie Trejo is an extremely pleasant 62 year old year old female patient here today for skin check. She notes more scaly stuck on papules on body. No painful or bleeding skin lesions. She repots that that she developed thickened nail recently. She notes she has been playing more pickle ball and note that she sometime have issues with toes.  Patient has no other skin complaints today.  Remainder of the HPI, Meds, PMH, Allergies, FH, and SH was reviewed in chart.    Pertinent Hx:  History of atypical nevi   Past Medical History:   Diagnosis Date     CHF (congestive heart failure) (H) 2013     Depressive disorder      Hypertension      Mitral valve insufficiency, acquired      Other and unspecified disc disorder of lumbar region      Scoliosis (and kyphoscoliosis), idiopathic      Undiagnosed cardiac murmurs      Unspecified hypothyroidism        Past Surgical History:   Procedure Laterality Date     BACK SURGERY       COLONOSCOPY       COLONOSCOPY N/A 2020    Procedure: COLONOSCOPY;  Surgeon: Shaun Torrez MD;  Location: [facility]     ESOPHAGOSCOPY, GASTROSCOPY, DUODENOSCOPY (EGD), COMBINED N/A 2020    Procedure: ESOPHAGOGASTRODUODENOSCOPY, WITH BIOPSY;  Surgeon: Elsie Kincaid MD;  Location: [facility]     GYN SURGERY       SOFT TISSUE SURGERY       SURGICAL HISTORY OF -            SURGICAL HISTORY OF -       Laparoscopy     SURGICAL HISTORY OF -       Hernia Repair      SURGICAL HISTORY OF -       Back Fusion T3 - L3        Family History   Problem Relation Age of Onset     Lipids Mother      Thyroid Disease Mother      Eye Disorder Mother         macular degeneration     Colon Cancer Mother      Anesthesia Reaction Mother         allergic to some     Hypertension Father      Cardiovascular Father         MI     Osteoporosis Father      Alzheimer Disease Father      Genitourinary Problems Father         incontinence     Breast Cancer Maternal Grandmother      Cerebrovascular  Disease Maternal Grandfather      Diabetes Paternal Grandmother      Gastrointestinal Disease Sister         colitis     Respiratory Daughter         asthma       Social History     Socioeconomic History     Marital status:      Spouse name: Not on file     Number of children: Not on file     Years of education: Not on file     Highest education level: Not on file   Occupational History     Not on file   Tobacco Use     Smoking status: Never     Smokeless tobacco: Never   Vaping Use     Vaping status: Not on file   Substance and Sexual Activity     Alcohol use: No     Alcohol/week: 0.0 standard drinks of alcohol     Drug use: No     Sexual activity: Yes     Partners: Male     Birth control/protection: Post-menopausal   Other Topics Concern     Parent/sibling w/ CABG, MI or angioplasty before 65F 55M? No   Social History Narrative     Not on file     Social Determinants of Health     Financial Resource Strain: Not on file   Food Insecurity: Not on file   Transportation Needs: Not on file   Physical Activity: Not on file   Stress: Not on file   Social Connections: Not on file   Intimate Partner Violence: Not on file   Housing Stability: Not on file       Outpatient Encounter Medications as of 6/8/2023   Medication Sig Dispense Refill     Apoaequorin (PREVAGEN EXTRA STRENGTH) 20 MG CAPS Take 1 tablet by mouth daily 100 capsule 3     cyclobenzaprine (FLEXERIL) 10 MG tablet Take 1 tablet (10 mg) by mouth 3 times daily as needed for muscle spasms 30 tablet 4     hydrochlorothiazide (HYDRODIURIL) 25 MG tablet Take 1 tablet (25 mg) by mouth daily 90 tablet 3     ibuprofen (ADVIL/MOTRIN) 600 MG tablet        levothyroxine (SYNTHROID/LEVOTHROID) 125 MCG tablet TAKE 1 TABLET BY MOUTH ONCE DAILY . 90 tablet 3     losartan (COZAAR) 50 MG tablet Take 1 tablet (50 mg) by mouth daily 90 tablet 3     Multiple Vitamins-Minerals (MULTIVITAMIN GUMMIES WOMENS) CHEW Take 1 tablet by mouth daily 1 tablet 0     pantoprazole  (PROTONIX) 40 MG EC tablet TAKE 1  BY MOUTH ONCE DAILY. TAKE 60 MINUTES BEFORE ANY FOOD OR DRINK INTAKE 90 tablet 3     temazepam (RESTORIL) 15 MG capsule Take 1-2 capsules (15-30 mg) by mouth nightly as needed for sleep 60 capsule 0     vitamin D3 (CHOLECALCIFEROL) 50 mcg (2000 units) tablet Take 1 tablet (50 mcg) by mouth daily 1 tablet 0     No facility-administered encounter medications on file as of 6/8/2023.             O:   NAD, WDWN, Alert & Oriented, Mood & Affect wnl, Vitals stable   Here today alone   LMP 09/21/2012    General appearance normal   Vitals stable   Alert, oriented and in no acute distress     0.5 cm brown irregular macule on right upper arm  0.5 cm brown irregular macule on left lateral thigh  0.3 cm red papule on left superior helix   Stuck on papules and brown macules on trunk and ext   Red papules on trunk  Brown papules and macules with regular pigment network on torso and extremities      The remainder of skin exam is normal       Eyes: Conjunctivae/lids:Normal     ENT: Lips: normal    MSK:Normal    Cardiovascular: peripheral edema none    Pulm: Breathing Normal    Neuro/Psych: Orientation:Alert and Orientedx3 ; Mood/Affect:normal     A/P:  1. R/O atypical nevi on left lateral thigh and right upper arm  TANGENTIAL BIOPSY SENT OUT:  After consent, anesthesia with LEC and prep, tangential excision performed and specimen sent out for permanent section histology.  No complications and routine wound care. Patient told to call our office in 1-2 weeks for result.      2. R/O angioma on left superior helix   TANGENTIAL BIOPSY SENT OUT:  After consent, anesthesia with LEC and prep, tangential excision performed and specimen sent out for permanent section histology.  No complications and routine wound care. Patient told to call our office in 1-2 weeks for result.      3, inflamed seborrheic keratosis on back and chest x 10  LN2:  Treated with LN2 for 5s for 1-2 cycles. Warned risks of blistering,  pain, pigment change, scarring, and incomplete resolution.  Advised patient to return if lesions do not completely resolve.  Wound care sheet given.  4. Seborrheic keratosis, lentigo, angioma, benign nevi   Dystrophic nail on left foot, mostly likely due to trauma.   It was a pleasure speaking to Stephanie Trejo today.  BENIGN LESIONS DISCUSSED WITH PATIENT:  I discussed the specifics of tumor, prognosis, and genetics of benign lesions.  I explained that treatment of these lesions would be purely cosmetic and not medically neccessary.  I discussed with patient different removal options including excision, cautery and /or laser.      Nature and genetics of benign skin lesions dicussed with patient.  Signs and Symptoms of skin cancer discussed with patient.  ABCDEs of melanoma reviewed with patient.  Patient encouraged to perform monthly skin exams.  UV precautions reviewed with patient.  Risks of non-melanoma skin cancer discussed with patient   Return to clinic pending biopsy results.

## 2023-06-08 NOTE — LETTER
2023         RE: Stephanie Trejo   [address]  [facility] [address]        Dear Colleague,    Thank you for referring your patient, Stephanie Trejo, to the [facility]. Please see a copy of my visit note below.    Stephanie Trejo is an extremely pleasant 62 year old year old female patient here today for skin check. She notes more scaly stuck on papules on body. No painful or bleeding skin lesions. She repots that that she developed thickened nail recently. She notes she has been playing more pickle ball and note that she sometime have issues with toes.  Patient has no other skin complaints today.  Remainder of the HPI, Meds, PMH, Allergies, FH, and SH was reviewed in chart.    Pertinent Hx:  History of atypical nevi   Past Medical History:   Diagnosis Date     CHF (congestive heart failure) (H) 2013     Depressive disorder      Hypertension      Mitral valve insufficiency, acquired      Other and unspecified disc disorder of lumbar region      Scoliosis (and kyphoscoliosis), idiopathic      Undiagnosed cardiac murmurs      Unspecified hypothyroidism        Past Surgical History:   Procedure Laterality Date     BACK SURGERY       COLONOSCOPY       COLONOSCOPY N/A 2020    Procedure: COLONOSCOPY;  Surgeon: Shaun Torrez MD;  Location: [facility]     ESOPHAGOSCOPY, GASTROSCOPY, DUODENOSCOPY (EGD), COMBINED N/A 2020    Procedure: ESOPHAGOGASTRODUODENOSCOPY, WITH BIOPSY;  Surgeon: Elsie Kincaid MD;  Location: [facility]     GYN SURGERY       SOFT TISSUE SURGERY       SURGICAL HISTORY OF -            SURGICAL HISTORY OF -       Laparoscopy     SURGICAL HISTORY OF -       Hernia Repair      SURGICAL HISTORY OF -       Back Fusion T3 - L3        Family History   Problem Relation Age of Onset     Lipids Mother      Thyroid Disease Mother      Eye Disorder Mother         macular degeneration     Colon Cancer Mother      Anesthesia Reaction Mother          allergic to some     Hypertension Father      Cardiovascular Father         MI     Osteoporosis Father      Alzheimer Disease Father      Genitourinary Problems Father         incontinence     Breast Cancer Maternal Grandmother      Cerebrovascular Disease Maternal Grandfather      Diabetes Paternal Grandmother      Gastrointestinal Disease Sister         colitis     Respiratory Daughter         asthma       Social History     Socioeconomic History     Marital status:      Spouse name: Not on file     Number of children: Not on file     Years of education: Not on file     Highest education level: Not on file   Occupational History     Not on file   Tobacco Use     Smoking status: Never     Smokeless tobacco: Never   Vaping Use     Vaping status: Not on file   Substance and Sexual Activity     Alcohol use: No     Alcohol/week: 0.0 standard drinks of alcohol     Drug use: No     Sexual activity: Yes     Partners: Male     Birth control/protection: Post-menopausal   Other Topics Concern     Parent/sibling w/ CABG, MI or angioplasty before 65F 55M? No   Social History Narrative     Not on file     Social Determinants of Health     Financial Resource Strain: Not on file   Food Insecurity: Not on file   Transportation Needs: Not on file   Physical Activity: Not on file   Stress: Not on file   Social Connections: Not on file   Intimate Partner Violence: Not on file   Housing Stability: Not on file       Outpatient Encounter Medications as of 6/8/2023   Medication Sig Dispense Refill     Apoaequorin (PREVAGEN EXTRA STRENGTH) 20 MG CAPS Take 1 tablet by mouth daily 100 capsule 3     cyclobenzaprine (FLEXERIL) 10 MG tablet Take 1 tablet (10 mg) by mouth 3 times daily as needed for muscle spasms 30 tablet 4     hydrochlorothiazide (HYDRODIURIL) 25 MG tablet Take 1 tablet (25 mg) by mouth daily 90 tablet 3     ibuprofen (ADVIL/MOTRIN) 600 MG tablet        levothyroxine (SYNTHROID/LEVOTHROID) 125 MCG tablet TAKE 1 TABLET  BY MOUTH ONCE DAILY . 90 tablet 3     losartan (COZAAR) 50 MG tablet Take 1 tablet (50 mg) by mouth daily 90 tablet 3     Multiple Vitamins-Minerals (MULTIVITAMIN GUMMIES WOMENS) CHEW Take 1 tablet by mouth daily 1 tablet 0     pantoprazole (PROTONIX) 40 MG EC tablet TAKE 1  BY MOUTH ONCE DAILY. TAKE 60 MINUTES BEFORE ANY FOOD OR DRINK INTAKE 90 tablet 3     temazepam (RESTORIL) 15 MG capsule Take 1-2 capsules (15-30 mg) by mouth nightly as needed for sleep 60 capsule 0     vitamin D3 (CHOLECALCIFEROL) 50 mcg (2000 units) tablet Take 1 tablet (50 mcg) by mouth daily 1 tablet 0     No facility-administered encounter medications on file as of 6/8/2023.             O:   NAD, WDWN, Alert & Oriented, Mood & Affect wnl, Vitals stable   Here today alone   LMP 09/21/2012    General appearance normal   Vitals stable   Alert, oriented and in no acute distress     0.5 cm brown irregular macule on right upper arm  0.5 cm brown irregular macule on left lateral thigh  0.3 cm red papule on left superior helix   Stuck on papules and brown macules on trunk and ext   Red papules on trunk  Brown papules and macules with regular pigment network on torso and extremities      The remainder of skin exam is normal       Eyes: Conjunctivae/lids:Normal     ENT: Lips: normal    MSK:Normal    Cardiovascular: peripheral edema none    Pulm: Breathing Normal    Neuro/Psych: Orientation:Alert and Orientedx3 ; Mood/Affect:normal     A/P:  1. R/O atypical nevi on left lateral thigh and right upper arm  TANGENTIAL BIOPSY SENT OUT:  After consent, anesthesia with LEC and prep, tangential excision performed and specimen sent out for permanent section histology.  No complications and routine wound care. Patient told to call our office in 1-2 weeks for result.      2. R/O angioma on left superior helix   TANGENTIAL BIOPSY SENT OUT:  After consent, anesthesia with LEC and prep, tangential excision performed and specimen sent out for permanent section  histology.  No complications and routine wound care. Patient told to call our office in 1-2 weeks for result.      3, inflamed seborrheic keratosis on back and chest x 10  LN2:  Treated with LN2 for 5s for 1-2 cycles. Warned risks of blistering, pain, pigment change, scarring, and incomplete resolution.  Advised patient to return if lesions do not completely resolve.  Wound care sheet given.  4. Seborrheic keratosis, lentigo, angioma, benign nevi   Dystrophic nail on left foot, mostly likely due to trauma.   It was a pleasure speaking to Stephanie Trejo today.  BENIGN LESIONS DISCUSSED WITH PATIENT:  I discussed the specifics of tumor, prognosis, and genetics of benign lesions.  I explained that treatment of these lesions would be purely cosmetic and not medically neccessary.  I discussed with patient different removal options including excision, cautery and /or laser.      Nature and genetics of benign skin lesions dicussed with patient.  Signs and Symptoms of skin cancer discussed with patient.  ABCDEs of melanoma reviewed with patient.  Patient encouraged to perform monthly skin exams.  UV precautions reviewed with patient.  Risks of non-melanoma skin cancer discussed with patient   Return to clinic pending biopsy results.         Again, thank you for allowing me to participate in the care of your patient.        Sincerely,        Sandy Saleem PA-C

## 2023-06-08 NOTE — NURSING NOTE
Chief Complaint   Patient presents with     Skin Check     Mole check & L foot 2nd toe nail        There were no vitals filed for this visit.  Wt Readings from Last 1 Encounters:   05/15/23 90.7 kg (200 lb)       Rama Feliciano LPN .................6/8/2023

## 2023-06-08 NOTE — PATIENT INSTRUCTIONS
Wound Care Instructions     FOR SUPERFICIAL WOUNDS     [facility] [phone]    [facility] [phone]                       AFTER 24 HOURS YOU SHOULD REMOVE THE BANDAGE AND BEGIN DAILY DRESSING CHANGES AS FOLLOWS:     1) Remove Dressing.     2) Clean and dry the area with tap water using a Q-tip or sterile gauze pad.     3) Apply Vaseline, Aquaphor, Polysporin ointment or Bacitracin ointment over entire wound.  Do NOT use Neosporin ointment.     4) Cover the wound with a band-aid, or a sterile non-stick gauze pad and micropore paper tape      REPEAT THESE INSTRUCTIONS AT LEAST ONCE A DAY UNTIL THE WOUND HAS COMPLETELY HEALED.    It is an old wives tale that a wound heals better when it is exposed to air and allowed to dry out. The wound will heal faster with a better cosmetic result if it is kept moist with ointment and covered with a bandage.    **Do not let the wound dry out.**      Supplies Needed:      *Cotton tipped applicators (Q-tips)    *Polysporin Ointment or Bacitracin Ointment (NOT NEOSPORIN)    *Band-aids or non-stick gauze pads and micropore paper tape.      PATIENT INFORMATION:    During the healing process you will notice a number of changes. All wounds develop a small halo of redness surrounding the wound.  This means healing is occurring. Severe itching with extensive redness usually indicates sensitivity to the ointment or bandage tape used to dress the wound.  You should call our office if this develops.      Swelling  and/or discoloration around your surgical site is common, particularly when performed around the eye.    All wounds normally drain.  The larger the wound the more drainage there will be.  After 7-10 days, you will notice the wound beginning to shrink and new skin will begin to grow.  The wound is healed when you can see skin has formed over the entire area.  A healed wound has a healthy, shiny look to the surface and is red to dark pink in color  to normalize.  Wounds may take approximately 4-6 weeks to heal.  Larger wounds may take 6-8 weeks.  After the wound is healed you may discontinue dressing changes.    You may experience a sensation of tightness as your wound heals. This is normal and will gradually subside.    Your healed wound may be sensitive to temperature changes. This sensitivity improves with time, but if you re having a lot of discomfort, try to avoid temperature extremes.    Patients frequently experience itching after their wound appears to have healed because of the continue healing under the skin.  Plain Vaseline will help relieve the itching.        POSSIBLE COMPLICATIONS    BLEEDING:    Leave the bandage in place.  Use tightly rolled up gauze or a cloth to apply direct pressure over the bandage for 30  minutes.  Reapply pressure for an additional 30 minutes if necessary  Use additional gauze and tape to maintain pressure once the bleeding has stopped.   WOUND CARE INSTRUCTIONS   FOR CRYOSURGERY   This area treated with liquid nitrogen should form a blister (areas treated may or may not blister-skin may just turn dark and slough off). You do not need to bandage the area unless a blister forms and breaks (which may be a few days). When the blister breaks, begin daily dressing changes as follows:  1) Clean and dry the area with tap water using clean Q-tip or sterile gauze pad.   2) Apply Polysporin ointment or Bacitracin ointment over entire wound. Do NOT use Neosporin ointment.   3) Cover the wound with a band-aid or sterile non-stick gauze pad and micropore paper tape.   REPEAT THESE INSTRUCTIONS AT LEAST ONCE A DAY UNTIL THE WOUND HAS COMPLETELY HEALED.   It is an old wives tale that a wound heals better when it is exposed to air and allowed to dry out. The wound will heal faster with a better cosmetic result if it is kept moist with ointment and covered with a bandage.   Do not let the wound dry out.   IMPORTANT INFORMATION ON REVERSE  SIDE   Supplies Needed:   *Cotton tipped applicators (Q-tips)   *Polysporin ointment or Bacitracin ointment (NOT NEOSPORIN)   *Band-aids, or non stick gauze pads and micropore paper tape   PATIENT INFORMATION   During the healing process you will notice a number of changes. All wounds develop a small halo of redness surrounding the wound. This means healing is occurring. Severe itching with extensive redness usually indicates sensitivity to the ointment or bandage tape used to dress the wound. You should call our office if this develops.   Swelling and/or discoloration around your surgical site is common, particularly when performed around the eye.   All wounds normally drain. The larger the wound the more drainage there will be. After 7-10 days, you will notice the wound beginning to shrink and new skin will begin to grow. The wound is healed when you can see skin has formed over the entire area. A healed wound has a healthy, shiny look to the surface and is red to dark pink in color to normalize. Wounds may take approximately 4-6 weeks to heal. Larger wounds may take 6-8 weeks. After the wound is healed you may discontinue dressing changes.   You may experience a sensation of tightness as your wound heals. This is normal and will gradually subside.   Your healed wound may be sensitive to temperature changes. This sensitivity improves with time, but if you re having a lot of discomfort, try to avoid temperature extremes.   Patients frequently experience itching after their wound appears to have healed because of the continue healing under the skin. Plain Vaseline will help relieve the itching.

## 2023-06-12 LAB
PATH REPORT.COMMENTS IMP SPEC: NORMAL
PATH REPORT.COMMENTS IMP SPEC: NORMAL
PATH REPORT.FINAL DX SPEC: NORMAL
PATH REPORT.GROSS SPEC: NORMAL
PATH REPORT.MICROSCOPIC SPEC OTHER STN: NORMAL
PATH REPORT.RELEVANT HX SPEC: NORMAL

## 2023-07-11 ENCOUNTER — PATIENT OUTREACH (OUTPATIENT)
Dept: CARE COORDINATION | Facility: CLINIC | Age: 63
End: 2023-07-11
Payer: COMMERCIAL

## 2023-07-25 ENCOUNTER — PATIENT OUTREACH (OUTPATIENT)
Dept: CARE COORDINATION | Facility: CLINIC | Age: 63
End: 2023-07-25
Payer: COMMERCIAL

## 2023-08-09 ASSESSMENT — PATIENT HEALTH QUESTIONNAIRE - PHQ9
SUM OF ALL RESPONSES TO PHQ QUESTIONS 1-9: 1
SUM OF ALL RESPONSES TO PHQ QUESTIONS 1-9: 1
10. IF YOU CHECKED OFF ANY PROBLEMS, HOW DIFFICULT HAVE THESE PROBLEMS MADE IT FOR YOU TO DO YOUR WORK, TAKE CARE OF THINGS AT HOME, OR GET ALONG WITH OTHER PEOPLE: NOT DIFFICULT AT ALL

## 2023-08-10 ENCOUNTER — OFFICE VISIT (OUTPATIENT)
Dept: FAMILY MEDICINE | Facility: CLINIC | Age: 63
End: 2023-08-10
Payer: COMMERCIAL

## 2023-08-10 VITALS
RESPIRATION RATE: 18 BRPM | WEIGHT: 208.7 LBS | DIASTOLIC BLOOD PRESSURE: 84 MMHG | TEMPERATURE: 97.7 F | BODY MASS INDEX: 29.88 KG/M2 | HEIGHT: 70 IN | OXYGEN SATURATION: 98 % | SYSTOLIC BLOOD PRESSURE: 140 MMHG | HEART RATE: 66 BPM

## 2023-08-10 DIAGNOSIS — H61.23 BILATERAL IMPACTED CERUMEN: Primary | ICD-10-CM

## 2023-08-10 PROCEDURE — 99213 OFFICE O/P EST LOW 20 MIN: CPT | Mod: 25 | Performed by: NURSE PRACTITIONER

## 2023-08-10 PROCEDURE — 69209 REMOVE IMPACTED EAR WAX UNI: CPT | Mod: 50 | Performed by: NURSE PRACTITIONER

## 2023-08-10 NOTE — PROGRESS NOTES
"  Assessment & Plan     Bilateral impacted cerumen    - [facility] REMOVAL IMPACTED CERUMEN IRRIGATION/LVG UNILAT  Ear lavage performed by MA- patient developed pain on right ear while lavage was in process. MA stopped procedure. Ear pain subsided a few minutes later.   Post lavage evaluation: Right ear canal cerumen was fully removed- ear canal irritated/bright red s/t irrigation, TM shiny.   Left ear: there is debris present on canal but cerumen was removed- unable to appreciate TM.     Hypertension:  In addition her BP is elevated today. States blood pressure gets elevated when she goes to the doctor's office. Taking her losartan as prescribed. Not checking BP at home. Recommended to start checking BP at home  and to bring log on next visit. If persistently >140/90 notify provider.       BMI:   Estimated body mass index is 29.95 kg/m  as calculated from the following:    Height as of this encounter: 1.778 m (5' 10\").    Weight as of this encounter: 94.7 kg (208 lb 11.2 oz).           KAY Ramsay [facility]   Stephanie is a 62 year old, presenting for the following health issues:  office visit  and Recheck Medication (Pt is here ear cleaning )        8/10/2023     1:40 PM   Additional Questions   Roomed by porter         8/10/2023     1:40 PM   Patient Reported Additional Medications   Patient reports taking the following new medications no       Cannot hear well on right ear. Is starting to get a little discomfort but otherwise not pain. No fever, recent illness, no ear drainage.  She will be traveling in 1 week and wants to have ears cleansed before getting in the plane.  Every year needs to have ears cleansed because of excess cerumen.    She plans to get her annual welcome to medicare visit in October, but could not wait until October to clean her ears.      History of Present Illness       Reason for visit:  Ear wax removal    She eats 2-3 servings of fruits " "and vegetables daily.She consumes 0 sweetened beverage(s) daily.She exercises with enough effort to increase her heart rate 30 to 60 minutes per day.  She exercises with enough effort to increase her heart rate 4 days per week.   She is taking medications regularly.         Review of Systems   See HPI, all other systems on review are negative.        Objective    BP (!) 140/84   Pulse 66   Temp 97.7  F (36.5  C) (Oral)   Resp 18   Ht 1.778 m (5' 10\")   Wt 94.7 kg (208 lb 11.2 oz)   LMP 09/21/2012   SpO2 98%   BMI 29.95 kg/m    Body mass index is 29.95 kg/m .  Physical Exam   GENERAL: healthy, alert and no distress  HENT: bilateral ear canals obstructed with cerumen, unable to visualize TM, nose and mouth without ulcers or lesions  NECK: no adenopathy, no asymmetry, masses, or scars and thyroid normal to palpation  RESP: lungs clear to auscultation - no rales, rhonchi or wheezes  CV: regular rate and rhythm, normal S1 S2, no S3 or S4, no murmur, click or rub, no peripheral edema and peripheral pulses strong  MS: no gross musculoskeletal defects noted, no edema                      "

## 2023-08-10 NOTE — PROCEDURES
Patient identified using two patient identifiers.  Ear exam showing wax occlusion completed by RN.  Solution: warm water was placed in the bilateral ear(s) via irrigation tool: elephant ear.

## 2023-09-09 DIAGNOSIS — I10 ESSENTIAL HYPERTENSION: ICD-10-CM

## 2023-09-11 RX ORDER — LOSARTAN POTASSIUM 50 MG/1
50 TABLET ORAL DAILY
Qty: 90 TABLET | Refills: 0 | Status: SHIPPED | OUTPATIENT
Start: 2023-09-11 | End: 2023-11-16

## 2023-09-20 ENCOUNTER — TRANSFERRED RECORDS (OUTPATIENT)
Dept: HEALTH INFORMATION MANAGEMENT | Facility: CLINIC | Age: 63
End: 2023-09-20
Payer: COMMERCIAL

## 2023-09-23 DIAGNOSIS — I10 ESSENTIAL HYPERTENSION: ICD-10-CM

## 2023-09-25 RX ORDER — HYDROCHLOROTHIAZIDE 25 MG/1
25 TABLET ORAL DAILY
Qty: 90 TABLET | Refills: 0 | Status: SHIPPED | OUTPATIENT
Start: 2023-09-25 | End: 2023-11-16

## 2023-09-25 NOTE — TELEPHONE ENCOUNTER
Patient scheduled for 11/17 with Dr. Felton. Patient only has 1 day of medication left. Asking for bridge refill.

## 2023-09-25 NOTE — TELEPHONE ENCOUNTER
"Stephanie is due for a Yearly Preventative visit. Thank you!    Jennie Alvarez, RN      Requested Prescriptions   Pending Prescriptions Disp Refills    hydrochlorothiazide (HYDRODIURIL) 25 MG tablet [Pharmacy Med Name: hydroCHLOROthiazide 25 MG Oral Tablet] 90 tablet 0     Sig: Take 1 tablet by mouth once daily       Diuretics (Including Combos) Protocol Failed - 9/23/2023  8:07 AM        Failed - Blood pressure under 140/90 in past 12 months     BP Readings from Last 3 Encounters:   08/10/23 (!) 140/84   05/15/23 (!) 146/81   03/30/23 (!) 145/96                 Failed - Recent (12 mo) or future (30 days) visit within the authorizing provider's specialty     Patient has had an office visit with the authorizing provider or a provider within the authorizing providers department within the previous 12 mos or has a future within next 30 days. See \"Patient Info\" tab in inbasket, or \"Choose Columns\" in Meds & Orders section of the refill encounter.              Failed - Normal serum creatinine on file in past 12 months     Recent Labs   Lab Test 02/02/23  0903   CR 1.07*              Passed - Medication is active on med list        Passed - Patient is age 18 or older        Passed - No active pregancy on record        Passed - Normal serum potassium on file in past 12 months     Recent Labs   Lab Test 02/02/23  0903   POTASSIUM 3.7                    Passed - Normal serum sodium on file in past 12 months     Recent Labs   Lab Test 02/02/23  0903                 Passed - No positive pregnancy test in past 12 months             "

## 2023-10-01 DIAGNOSIS — K21.9 GASTROESOPHAGEAL REFLUX DISEASE WITHOUT ESOPHAGITIS: ICD-10-CM

## 2023-10-02 RX ORDER — PANTOPRAZOLE SODIUM 40 MG/1
TABLET, DELAYED RELEASE ORAL
Qty: 90 TABLET | Refills: 0 | Status: SHIPPED | OUTPATIENT
Start: 2023-10-02 | End: 2023-11-16

## 2023-10-30 DIAGNOSIS — F51.04 PSYCHOPHYSIOLOGICAL INSOMNIA: ICD-10-CM

## 2023-10-30 DIAGNOSIS — F41.9 ANXIETY: ICD-10-CM

## 2023-10-30 RX ORDER — TEMAZEPAM 15 MG/1
CAPSULE ORAL
Qty: 60 CAPSULE | Refills: 0 | Status: SHIPPED | OUTPATIENT
Start: 2023-10-30 | End: 2023-11-16

## 2023-11-15 ASSESSMENT — ENCOUNTER SYMPTOMS
BREAST MASS: 0
DIZZINESS: 0
WEAKNESS: 0
JOINT SWELLING: 0
SORE THROAT: 0
HEMATURIA: 0
ARTHRALGIAS: 0
DYSURIA: 0
CONSTIPATION: 0
HEARTBURN: 0
SHORTNESS OF BREATH: 0
HEADACHES: 0
FEVER: 0
COUGH: 0
PALPITATIONS: 0
EYE PAIN: 0
FREQUENCY: 0
MYALGIAS: 0
PARESTHESIAS: 0
HEMATOCHEZIA: 0
DIARRHEA: 0
NERVOUS/ANXIOUS: 0
NAUSEA: 0
ABDOMINAL PAIN: 0
CHILLS: 0

## 2023-11-15 ASSESSMENT — PATIENT HEALTH QUESTIONNAIRE - PHQ9
SUM OF ALL RESPONSES TO PHQ QUESTIONS 1-9: 5
10. IF YOU CHECKED OFF ANY PROBLEMS, HOW DIFFICULT HAVE THESE PROBLEMS MADE IT FOR YOU TO DO YOUR WORK, TAKE CARE OF THINGS AT HOME, OR GET ALONG WITH OTHER PEOPLE: SOMEWHAT DIFFICULT
SUM OF ALL RESPONSES TO PHQ QUESTIONS 1-9: 5

## 2023-11-15 ASSESSMENT — ACTIVITIES OF DAILY LIVING (ADL): CURRENT_FUNCTION: NO ASSISTANCE NEEDED

## 2023-11-16 ENCOUNTER — OFFICE VISIT (OUTPATIENT)
Dept: FAMILY MEDICINE | Facility: CLINIC | Age: 63
End: 2023-11-16
Payer: COMMERCIAL

## 2023-11-16 VITALS
RESPIRATION RATE: 16 BRPM | HEART RATE: 71 BPM | DIASTOLIC BLOOD PRESSURE: 80 MMHG | SYSTOLIC BLOOD PRESSURE: 110 MMHG | OXYGEN SATURATION: 94 % | HEIGHT: 70 IN | WEIGHT: 205 LBS | TEMPERATURE: 97.6 F | BODY MASS INDEX: 29.35 KG/M2

## 2023-11-16 DIAGNOSIS — Z00.00 ENCOUNTER FOR MEDICARE ANNUAL WELLNESS EXAM: Primary | ICD-10-CM

## 2023-11-16 DIAGNOSIS — F32.0 MILD MAJOR DEPRESSION (H): ICD-10-CM

## 2023-11-16 DIAGNOSIS — K21.9 GASTROESOPHAGEAL REFLUX DISEASE WITHOUT ESOPHAGITIS: ICD-10-CM

## 2023-11-16 DIAGNOSIS — F51.04 PSYCHOPHYSIOLOGICAL INSOMNIA: ICD-10-CM

## 2023-11-16 DIAGNOSIS — F41.9 ANXIETY: ICD-10-CM

## 2023-11-16 DIAGNOSIS — I10 ESSENTIAL HYPERTENSION: ICD-10-CM

## 2023-11-16 DIAGNOSIS — N18.31 CHRONIC KIDNEY DISEASE, STAGE 3A (H): ICD-10-CM

## 2023-11-16 DIAGNOSIS — E03.9 HYPOTHYROIDISM, UNSPECIFIED TYPE: ICD-10-CM

## 2023-11-16 LAB
ALBUMIN SERPL BCG-MCNC: 4.7 G/DL (ref 3.5–5.2)
ALP SERPL-CCNC: 124 U/L (ref 40–150)
ALT SERPL W P-5'-P-CCNC: 44 U/L (ref 0–50)
ANION GAP SERPL CALCULATED.3IONS-SCNC: 11 MMOL/L (ref 7–15)
AST SERPL W P-5'-P-CCNC: 48 U/L (ref 0–45)
BILIRUB SERPL-MCNC: 0.7 MG/DL
BUN SERPL-MCNC: 14.7 MG/DL (ref 8–23)
CALCIUM SERPL-MCNC: 10 MG/DL (ref 8.8–10.2)
CHLORIDE SERPL-SCNC: 99 MMOL/L (ref 98–107)
CHOLEST SERPL-MCNC: 272 MG/DL
CREAT SERPL-MCNC: 1.15 MG/DL (ref 0.51–0.95)
DEPRECATED HCO3 PLAS-SCNC: 30 MMOL/L (ref 22–29)
EGFRCR SERPLBLD CKD-EPI 2021: 53 ML/MIN/1.73M2
ERYTHROCYTE [DISTWIDTH] IN BLOOD BY AUTOMATED COUNT: 13.2 % (ref 10–15)
GLUCOSE SERPL-MCNC: 105 MG/DL (ref 70–99)
HCT VFR BLD AUTO: 43.9 % (ref 35–47)
HDLC SERPL-MCNC: 74 MG/DL
HGB BLD-MCNC: 14.6 G/DL (ref 11.7–15.7)
LDLC SERPL CALC-MCNC: 182 MG/DL
MCH RBC QN AUTO: 30.9 PG (ref 26.5–33)
MCHC RBC AUTO-ENTMCNC: 33.3 G/DL (ref 31.5–36.5)
MCV RBC AUTO: 93 FL (ref 78–100)
NONHDLC SERPL-MCNC: 198 MG/DL
PLATELET # BLD AUTO: 200 10E3/UL (ref 150–450)
POTASSIUM SERPL-SCNC: 4.1 MMOL/L (ref 3.4–5.3)
PROT SERPL-MCNC: 7.6 G/DL (ref 6.4–8.3)
RBC # BLD AUTO: 4.73 10E6/UL (ref 3.8–5.2)
SODIUM SERPL-SCNC: 140 MMOL/L (ref 135–145)
TRIGL SERPL-MCNC: 81 MG/DL
TSH SERPL DL<=0.005 MIU/L-ACNC: 1.78 UIU/ML (ref 0.3–4.2)
WBC # BLD AUTO: 5 10E3/UL (ref 4–11)

## 2023-11-16 PROCEDURE — 80061 LIPID PANEL: CPT | Performed by: FAMILY MEDICINE

## 2023-11-16 PROCEDURE — 80053 COMPREHEN METABOLIC PANEL: CPT | Performed by: FAMILY MEDICINE

## 2023-11-16 PROCEDURE — 90480 ADMN SARSCOV2 VAC 1/ONLY CMP: CPT | Performed by: FAMILY MEDICINE

## 2023-11-16 PROCEDURE — 99214 OFFICE O/P EST MOD 30 MIN: CPT | Mod: 25 | Performed by: FAMILY MEDICINE

## 2023-11-16 PROCEDURE — 84443 ASSAY THYROID STIM HORMONE: CPT | Performed by: FAMILY MEDICINE

## 2023-11-16 PROCEDURE — G0402 INITIAL PREVENTIVE EXAM: HCPCS | Performed by: FAMILY MEDICINE

## 2023-11-16 PROCEDURE — 91320 SARSCV2 VAC 30MCG TRS-SUC IM: CPT | Performed by: FAMILY MEDICINE

## 2023-11-16 PROCEDURE — G0008 ADMIN INFLUENZA VIRUS VAC: HCPCS | Performed by: FAMILY MEDICINE

## 2023-11-16 PROCEDURE — 90662 IIV NO PRSV INCREASED AG IM: CPT | Performed by: FAMILY MEDICINE

## 2023-11-16 PROCEDURE — 36415 COLL VENOUS BLD VENIPUNCTURE: CPT | Performed by: FAMILY MEDICINE

## 2023-11-16 PROCEDURE — 85027 COMPLETE CBC AUTOMATED: CPT | Performed by: FAMILY MEDICINE

## 2023-11-16 RX ORDER — TEMAZEPAM 15 MG/1
CAPSULE ORAL
Qty: 60 CAPSULE | Refills: 5 | Status: SHIPPED | OUTPATIENT
Start: 2023-11-16 | End: 2024-02-27

## 2023-11-16 RX ORDER — HYDROCHLOROTHIAZIDE 25 MG/1
25 TABLET ORAL DAILY
Qty: 90 TABLET | Refills: 3 | Status: SHIPPED | OUTPATIENT
Start: 2023-11-16

## 2023-11-16 RX ORDER — LEVOTHYROXINE SODIUM 125 UG/1
TABLET ORAL
Qty: 90 TABLET | Refills: 3 | Status: SHIPPED | OUTPATIENT
Start: 2023-11-16

## 2023-11-16 RX ORDER — RESPIRATORY SYNCYTIAL VIRUS VACCINE 120MCG/0.5
0.5 KIT INTRAMUSCULAR ONCE
Qty: 1 EACH | Refills: 0 | Status: CANCELLED | OUTPATIENT
Start: 2023-11-16 | End: 2023-11-16

## 2023-11-16 RX ORDER — LOSARTAN POTASSIUM 50 MG/1
50 TABLET ORAL DAILY
Qty: 90 TABLET | Refills: 3 | Status: SHIPPED | OUTPATIENT
Start: 2023-11-16

## 2023-11-16 RX ORDER — DULOXETIN HYDROCHLORIDE 20 MG/1
20 CAPSULE, DELAYED RELEASE ORAL 2 TIMES DAILY
Qty: 60 CAPSULE | Refills: 5 | Status: SHIPPED | OUTPATIENT
Start: 2023-11-16 | End: 2024-04-22

## 2023-11-16 RX ORDER — PANTOPRAZOLE SODIUM 40 MG/1
TABLET, DELAYED RELEASE ORAL
Qty: 90 TABLET | Refills: 3 | Status: SHIPPED | OUTPATIENT
Start: 2023-11-16

## 2023-11-16 ASSESSMENT — ENCOUNTER SYMPTOMS
COUGH: 0
PARESTHESIAS: 0
BREAST MASS: 0
NERVOUS/ANXIOUS: 0
HEARTBURN: 0
FREQUENCY: 0
WEAKNESS: 0
HEMATURIA: 0
HEMATOCHEZIA: 0
HEADACHES: 0
SORE THROAT: 0
PALPITATIONS: 0
EYE PAIN: 0
DIZZINESS: 0
DYSURIA: 0
ARTHRALGIAS: 0
CHILLS: 0
DIARRHEA: 0
CONSTIPATION: 0
NAUSEA: 0
SHORTNESS OF BREATH: 0
MYALGIAS: 0
ABDOMINAL PAIN: 0
FEVER: 0
JOINT SWELLING: 0

## 2023-11-16 ASSESSMENT — ACTIVITIES OF DAILY LIVING (ADL): CURRENT_FUNCTION: NO ASSISTANCE NEEDED

## 2023-11-16 ASSESSMENT — PAIN SCALES - GENERAL: PAINLEVEL: NO PAIN (0)

## 2023-11-16 NOTE — PATIENT INSTRUCTIONS
Patient Education   Personalized Prevention Plan  You are due for the preventive services outlined below.  Your care team is available to assist you in scheduling these services.  If you have already completed any of these items, please share that information with your care team to update in your medical record.  Health Maintenance Due   Topic Date Due     Pneumococcal Vaccine (2 - PCV) 12/28/2006     RSV VACCINE (Pregnancy & 60+) (1 - 1-dose 60+ series) Never done     ANNUAL REVIEW OF HM ORDERS  06/24/2023     Basic Metabolic Panel  08/02/2023     Cholesterol Lab  08/02/2023     Thyroid Function Lab  08/02/2023     Flu Vaccine (1) 09/01/2023     COVID-19 Vaccine (5 - 2023-24 season) 09/01/2023     Annual Wellness Visit  10/13/2023     Hemoglobin  08/02/2023     Your Health Risk Assessment indicates you feel you are not in good health    A healthy lifestyle helps keep the body fit and the mind alert. It helps protect you from disease, helps you fight disease, and helps prevent chronic disease (disease that doesn't go away) from getting worse. This is important as you get older and begin to notice twinges in muscles and joints and a decline in the strength and stamina you once took for granted. A healthy lifestyle includes good healthcare, good nutrition, weight control, recreation, and regular exercise. Avoid harmful substances and do what you can to keep safe. Another part of a healthy lifestyle is stay mentally active and socially involved.    Good healthcare   Have a wellness visit every year.   If you have new symptoms, let us know right away. Don't wait until the next checkup.   Take medicines exactly as prescribed and keep your medicines in a safe place. Tell us if your medicine causes problems.   Healthy diet and weight control   Eat 3 or 4 small, nutritious, low-fat, high-fiber meals a day. Include a variety of fruits, vegetables, and whole-grain foods.   Make sure you get enough calcium in your diet.  Calcium, vitamin D, and exercise help prevent osteoporosis (bone thinning).   If you live alone, try eating with others when you can. That way you get a good meal and have company while you eat it.   Try to keep a healthy weight. If you eat more calories than your body uses for energy, it will be stored as fat and you will gain weight.     Recreation   Recreation is not limited to sports and team events. It includes any activity that provides relaxation, interest, enjoyment, and exercise. Recreation provides an outlet for physical, mental, and social energy. It can give a sense of worth and achievement. It can help you stay healthy.    Mental Exercise and Social Involvement  Mental and emotional health is as important as physical health. Keep in touch with friends and family. Stay as active as possible. Continue to learn and challenge yourself.   Things you can do to stay mentally active are:  Learn something new, like a foreign language or musical instrument.   Play SCRABBLE or do crossword puzzles. If you cannot find people to play these games with you at home, you can play them with others on your computer through the Internet.   Join a games club--anything from card games to chess or checkers or lawn bowling.   Start a new hobby.   Go back to school.   Volunteer.   Read.   Keep up with world events.  Hearing Loss: Care Instructions  Overview     Hearing loss is a sudden or slow decrease in how well you hear. It can range from slight to profound. Permanent hearing loss can occur with aging. It also can happen when you are exposed long-term to loud noise. Examples include listening to loud music, riding motorcycles, or being around other loud machines.  Hearing loss can affect your work and home life. It can make you feel lonely or depressed. You may feel that you have lost your independence. But hearing aids and other devices can help you hear better and feel connected to others.  Follow-up care is a key part of  your treatment and safety. Be sure to make and go to all appointments, and call your doctor if you are having problems. It's also a good idea to know your test results and keep a list of the medicines you take.  How can you care for yourself at home?  Avoid loud noises whenever possible. This helps keep your hearing from getting worse.  Always wear hearing protection around loud noises.  Wear a hearing aid as directed.  A professional can help you pick a hearing aid that will work best for you.  You can also get hearing aids over the counter for mild to moderate hearing loss.  Have hearing tests as your doctor suggests. They can show whether your hearing has changed. Your hearing aid may need to be adjusted.  Use other devices as needed. These may include:  Telephone amplifiers and hearing aids that can connect to a television, stereo, radio, or microphone.  Devices that use lights or vibrations. These alert you to the doorbell, a ringing telephone, or a baby monitor.  Television closed-captioning. This shows the words at the bottom of the screen. Most new TVs can do this.  TTY (text telephone). This lets you type messages back and forth on the telephone instead of talking or listening. These devices are also called TDD. When messages are typed on the keyboard, they are sent over the phone line to a receiving TTY. The message is shown on a monitor.  Use text messaging, social media, and email if it is hard for you to communicate by telephone.  Try to learn a listening technique called speechreading. It is not lipreading. You pay attention to people's gestures, expressions, posture, and tone of voice. These clues can help you understand what a person is saying. Face the person you are talking to, and have them face you. Make sure the lighting is good. You need to see the other person's face clearly.  Think about counseling if you need help to adjust to your hearing loss.  When should you call for help?  Watch closely  "for changes in your health, and be sure to contact your doctor if:    You think your hearing is getting worse.     You have new symptoms, such as dizziness or nausea.   Where can you learn more?  Go to https://www.[employer].net/patiented  Enter R798 in the search box to learn more about \"Hearing Loss: Care Instructions.\"  Current as of: February 28, 2023               Content Version: 13.8    0580-5439 [employer].   Care instructions adapted under license by your healthcare professional. If you have questions about a medical condition or this instruction, always ask your healthcare professional. [employer] disclaims any warranty or liability for your use of this information.      Your Health Risk Assessment indicates you feel you are not in good emotional health.    Recreation   Recreation is not limited to sports and team events. It includes any activity that provides relaxation, interest, enjoyment, and exercise. Recreation provides an outlet for physical, mental, and social energy. It can give a sense of worth and achievement. It can help you stay healthy.    Mental Exercise and Social Involvement  Mental and emotional health is as important as physical health. Keep in touch with friends and family. Stay as active as possible. Continue to learn and challenge yourself.   Things you can do to stay mentally active are:  Learn something new, like a foreign language or musical instrument.   Play SCRABBLE or do crossword puzzles. If you cannot find people to play these games with you at home, you can play them with others on your computer through the Internet.   Join a games club--anything from card games to chess or checkers or lawn bowling.   Start a new hobby.   Go back to school.   Volunteer.   Read.   Keep up with world events.  Learning About Depression Screening  What is depression screening?  Depression screening is a way to see if you have depression symptoms. It may be done by a " "doctor or counselor. It's often part of a routine checkup. That's because your mental health is just as important as your physical health.  Depression is a mental health condition that affects how you feel, think, and act. You may:  Have less energy.  Lose interest in your daily activities.  Feel sad and grouchy for a long time.  Depression is very common. It affects people of all ages.  Many things can lead to depression. Some people become depressed after they have a stroke or find out they have a major illness like cancer or heart disease. The death of a loved one or a breakup may lead to depression. It can run in families. Most experts believe that a combination of inherited genes and stressful life events can cause it.  What happens during screening?  You may be asked to fill out a form about your depression symptoms. You and the doctor will discuss your answers. The doctor may ask you more questions to learn more about how you think, act, and feel.  What happens after screening?  If you have symptoms of depression, your doctor will talk to you about your options.  Doctors usually treat depression with medicines or counseling. Often, combining the two works best. Many people don't get help because they think that they'll get over the depression on their own. But people with depression may not get better unless they get treatment.  The cause of depression is not well understood. There may be many factors involved. But if you have depression, it's not your fault.  A serious symptom of depression is thinking about death or suicide. If you or someone you care about talks about this or about feeling hopeless, get help right away.  It's important to know that depression can be treated. Medicine, counseling, and self-care may help.  Where can you learn more?  Go to https://www.healthwise.net/patiented  Enter T185 in the search box to learn more about \"Learning About Depression Screening.\"  Current as of: June 25, " 2023               Content Version: 13.8    6475-2456 [employer].   Care instructions adapted under license by your healthcare professional. If you have questions about a medical condition or this instruction, always ask your healthcare professional. [employer] disclaims any warranty or liability for your use of this information.

## 2023-11-16 NOTE — NURSING NOTE
"Chief Complaint   Patient presents with    Medicare Visit            /80   Pulse 71   Temp 97.6  F (36.4  C) (Tympanic)   Resp 16   Ht 1.765 m (5' 9.5\")   Wt 93 kg (205 lb)   LMP 09/21/2012   SpO2 94%   BMI 29.84 kg/m   Estimated body mass index is 29.84 kg/m  as calculated from the following:    Height as of this encounter: 1.765 m (5' 9.5\").    Weight as of this encounter: 93 kg (205 lb).  Patient presents to the clinic using No DME      Health Maintenance that is potentially due pending provider review:    Health Maintenance Due   Topic Date Due    Pneumococcal Vaccine: 65+ Years (2 - PCV) 12/28/2006    RSV VACCINE (Pregnancy & 60+) (1 - 1-dose 60+ series) Never done    ANNUAL REVIEW OF HM ORDERS  06/24/2023    BMP  08/02/2023    LIPID  08/02/2023    TSH W/FREE T4 REFLEX  08/02/2023    INFLUENZA VACCINE (1) 09/01/2023    COVID-19 Vaccine (5 - 2023-24 season) 09/01/2023    MEDICARE ANNUAL WELLNESS VISIT  10/13/2023    HEMOGLOBIN  08/02/2023        Will get flu and covid.          "

## 2023-11-16 NOTE — PROGRESS NOTES
"The patient was provided with suggestions to help her develop a healthy physical lifestyle.  The patient was provided with written information regarding signs of hearing loss.  The patient was provided with suggestions to help her develop a healthy emotional lifestyle.  The patient's PHQ-9 score is consistent with mild depression. She was provided with information regarding depression and was advised to schedule a follow up appointment in 53 weeks to further address this issue.  Answers submitted by the patient for this visit:  Patient Health Questionnaire (Submitted on 11/15/2023)  If you checked off any problems, how difficult have these problems made it for you to do your work, take care of things at home, or get along with other people?: Somewhat difficult  PHQ9 TOTAL SCORE: 5  Annual Preventive Visit (Submitted on 11/15/2023)  Chief Complaint: Annual Exam:  In general, how would you rate your overall physical health?: fair  Frequency of exercise:: 4-5 days/week  Do you usually eat at least 4 servings of fruit and vegetables a day, include whole grains & fiber, and avoid regularly eating high fat or \"junk\" foods? : Yes  Taking medications regularly:: Yes  Medication side effects:: None  Activities of Daily Living: no assistance needed  Home safety: no safety concerns identified  Hearing Impairment:: difficulty following a conversation in a noisy restaurant or crowded room, feel that people are mumbling or not speaking clearly, need to ask people to speak up or repeat themselves, difficulty understanding soft or whispered speech  In the past 6 months, have you been bothered by leaking of urine?: No  abdominal pain: No  Blood in stool: No  Blood in urine: No  chest pain: No  chills: No  congestion: No  constipation: No  cough: No  diarrhea: No  dizziness: No  ear pain: No  eye pain: No  nervous/anxious: No  fever: No  frequency: No  genital sores: No  headaches: No  hearing loss: Yes  heartburn: No  arthralgias: " No  joint swelling: No  peripheral edema: No  mood changes: Yes  myalgias: No  nausea: No  dysuria: No  palpitations: No  Skin sensation changes: No  sore throat: No  urgency: No  rash: No  shortness of breath: No  visual disturbance: No  weakness: No  pelvic pain: No  vaginal bleeding: No  vaginal discharge: No  tenderness: No  breast mass: No  breast discharge: No  In general, how would you rate your overall mental or emotional health?: fair  Additional concerns today:: Yes  Exercise outside of work (Submitted on 11/15/2023)  Chief Complaint: Annual Exam:  Duration of exercise:: 45-60 minutes

## 2023-11-16 NOTE — PROGRESS NOTES
"SUBJECTIVE:   Stephanie is a 63 year old, presenting for the following:  Medicare Visit (/)        11/16/2023     9:14 AM   Additional Questions   Roomed by Kassy MUKHERJEE   Accompanied by Self         11/16/2023     9:14 AM   Patient Reported Additional Medications   Patient reports taking the following new medications .       Are you in the first 12 months of your Medicare coverage?  Yes,  Visual Acuity:  Right Eye: 20/25   Left Eye: 20/25  Both Eyes: 20/25 - with glasses - goes to eye Doctor    Healthy Habits:     In general, how would you rate your overall health?  Fair    Frequency of exercise:  4-5 days/week    Duration of exercise:  45-60 minutes    Do you usually eat at least 4 servings of fruit and vegetables a day, include whole grains    & fiber and avoid regularly eating high fat or \"junk\" foods?  Yes    Taking medications regularly:  Yes    Medication side effects:  None    Ability to successfully perform activities of daily living:  No assistance needed    Home Safety:  No safety concerns identified    Hearing Impairment:  Difficulty following a conversation in a noisy restaurant or crowded room, feel that people are mumbling or not speaking clearly, need to ask people to speak up or repeat themselves and difficulty understanding soft or whispered speech    In the past 6 months, have you been bothered by leaking of urine?  No    In general, how would you rate your overall mental or emotional health?  Fair    Additional concerns today:  Yes      Today's PHQ-9 Score:       11/15/2023     9:47 AM   PHQ-9 SCORE   PHQ-9 Total Score MyChart 5 (Mild depression)   PHQ-9 Total Score 5           Have you ever done Advance Care Planning? (For example, a Health Directive, POLST, or a discussion with a medical provider or your loved ones about your wishes): Yes, advance care planning is on file.       Fall risk  Fallen 2 or more times in the past year?: No  Any fall with injury in the past year?: No    Cognitive Screening "   1) Repeat 3 items (Leader, Season, Table)    2) Clock draw:   3) 3 item recall: Recalls 3 objects  Results: 3 items recalled: COGNITIVE IMPAIRMENT LESS LIKELY    Mini-CogTM Copyright JOVAN Gupta. Licensed by the author for use in [facility]; reprinted with permission (zulema@[facility]). All rights reserved.      Do you have sleep apnea, excessive snoring or daytime drowsiness? : no    Reviewed and updated as needed this visit by clinical staff   Tobacco  Allergies  Meds  Problems  Med Hx  Surg Hx  Fam Hx          Reviewed and updated as needed this visit by Provider   Tobacco  Allergies  Meds  Problems  Med Hx  Surg Hx  Fam Hx         Social History     Tobacco Use    Smoking status: Never    Smokeless tobacco: Never   Substance Use Topics    Alcohol use: No             11/15/2023     9:50 AM   Alcohol Use   Prescreen: >3 drinks/day or >7 drinks/week? No          No data to display              Do you have a current opioid prescription? No  Do you use any other controlled substances or medications that are not prescribed by a provider? None          Hypertension Follow-up    Do you check your blood pressure regularly outside of the clinic? No   Are you following a low salt diet? Yes  Are your blood pressures ever more than 140 on the top number (systolic) OR more   than 90 on the bottom number (diastolic), for example 140/90?     Chronic Kidney Disease Follow-up    Do you take any over the counter pain medicine?: Yes  What over the counter medicine are you taking for your pain?:  ibuprofen,motrin    How often do you take this medicine?:   2 tabs of motrin prior to paying pickle ball  Hypothyroidism Follow-up    Since last visit, patient describes the following symptoms: Weight stable, no hair loss, no skin changes, no constipation, no loose stools    Depression and anxiety Followup  How are you doing with your depression since your last visit? Improved life event - daughter moved with her  family to [place]  Are you having other symptoms that might be associated with depression? No  Have you had a significant life event?  OTHER: family moved    Are you feeling anxious or having panic attacks?   No  Do you have any concerns with your use of alcohol or other drugs? No    Social History     Tobacco Use    Smoking status: Never    Smokeless tobacco: Never   Vaping Use    Vaping Use: Never used   Substance Use Topics    Alcohol use: No    Drug use: No         7/26/2022     9:03 AM 8/9/2023     5:58 PM 11/15/2023     9:47 AM   PHQ   PHQ-9 Total Score 2 1 5   Q9: Thoughts of better off dead/self-harm past 2 weeks Not at all Not at all Not at all         12/4/2018     2:39 PM 1/11/2020     7:51 AM 3/5/2021     1:33 PM   SHERON-7 SCORE   Total Score 1 (minimal anxiety) 0 (minimal anxiety)    Total Score 1 0 0         Suicide Assessment Five-step Evaluation and Treatment (SAFE-T)        GERD     Stable on meds   Tried to go off and not able   She is also have some issues with food getting stuck or cough and choking on food or water   It is random   Not with any certain foods  No stool changes     Current providers sharing in care for this patient include:   Patient Care Team:  Radha Felton MD as PCP - General  RehRadha fagan MD as Assigned PCP  Hany Quintero DPM as Assigned Surgical Provider    The following health maintenance items are reviewed in Epic and correct as of today:  Health Maintenance   Topic Date Due    Pneumococcal Vaccine: 65+ Years (2 - PCV) 12/28/2006    RSV VACCINE (Pregnancy & 60+) (1 - 1-dose 60+ series) Never done    BMP  08/02/2023    LIPID  08/02/2023    TSH W/FREE T4 REFLEX  08/02/2023    INFLUENZA VACCINE (1) 09/01/2023    COVID-19 Vaccine (5 - 2023-24 season) 09/01/2023    MEDICARE ANNUAL WELLNESS VISIT  10/13/2023    HEMOGLOBIN  08/02/2023    MICROALBUMIN  02/02/2024    PHQ-9  05/16/2024    ANNUAL REVIEW OF HM ORDERS  11/16/2024    FALL RISK ASSESSMENT   11/16/2024    COLORECTAL CANCER SCREENING  02/18/2025    MAMMO SCREENING  05/30/2025    DTAP/TDAP/TD IMMUNIZATION (3 - Td or Tdap) 11/29/2026    ADVANCE CARE PLANNING  11/16/2028    DEXA  04/15/2036    HEPATITIS C SCREENING  Completed    DEPRESSION ACTION PLAN  Completed    URINALYSIS  Completed    ZOSTER IMMUNIZATION  Completed    IPV IMMUNIZATION  Aged Out    HPV IMMUNIZATION  Aged Out    MENINGITIS IMMUNIZATION  Aged Out    RSV MONOCLONAL ANTIBODY  Aged Out    HIV SCREENING  Discontinued    PAP  Discontinued           FHS-7:       3/28/2021     7:54 AM 5/4/2022     4:39 PM 5/30/2023     3:26 PM 11/15/2023     9:52 AM   Breast CA Risk Assessment (FHS-7)   Did any of your first-degree relatives have breast or ovarian cancer? No No No No   Did any of your relatives have bilateral breast cancer? No No No No   Did any man in your family have breast cancer? No No No No   Did any woman in your family have breast and ovarian cancer? Yes No No Yes   Did any woman in your family have breast cancer before age 50 y?  No No No   Do you have 2 or more relatives with breast and/or ovarian cancer? No No No No   Do you have 2 or more relatives with breast and/or bowel cancer? No No No Yes       Mammogram Screening: Recommended mammography every 1-2 years with patient discussion and risk factor consideration  Pertinent mammograms are reviewed under the imaging tab.    Review of Systems   Constitutional:  Negative for chills and fever.   HENT:  Positive for hearing loss. Negative for congestion, ear pain and sore throat.    Eyes:  Negative for pain and visual disturbance.   Respiratory:  Negative for cough and shortness of breath.    Cardiovascular:  Negative for chest pain, palpitations and peripheral edema.   Gastrointestinal:  Negative for abdominal pain, constipation, diarrhea, heartburn, hematochezia and nausea.   Breasts:  Negative for tenderness, breast mass and discharge.   Genitourinary:  Negative for dysuria, frequency,  "genital sores, hematuria, pelvic pain, urgency, vaginal bleeding and vaginal discharge.   Musculoskeletal:  Negative for arthralgias, joint swelling and myalgias.   Skin:  Negative for rash.   Neurological:  Negative for dizziness, weakness, headaches and paresthesias.   Psychiatric/Behavioral:  Positive for mood changes. The patient is not nervous/anxious.          OBJECTIVE:   /80   Pulse 71   Temp 97.6  F (36.4  C) (Tympanic)   Resp 16   Ht 1.765 m (5' 9.5\")   Wt 93 kg (205 lb)   LMP 09/21/2012   SpO2 94%   BMI 29.84 kg/m   Estimated body mass index is 29.84 kg/m  as calculated from the following:    Height as of this encounter: 1.765 m (5' 9.5\").    Weight as of this encounter: 93 kg (205 lb).  Physical Exam  GENERAL APPEARANCE: healthy, alert and no distress  EYES: Eyes grossly normal to inspection, PERRL and conjunctivae and sclerae normal  HENT: ear canals and TM's normal, nose and mouth without ulcers or lesions, oropharynx clear and oral mucous membranes moist  NECK: no adenopathy, no asymmetry, masses, or scars and thyroid normal to palpation  RESP: lungs clear to auscultation - no rales, rhonchi or wheezes  CV: regular rate and rhythm, normal S1 S2, no S3 or S4, no murmur, click or rub, no peripheral edema and peripheral pulses strong  ABDOMEN: soft, nontender, no hepatosplenomegaly, no masses and bowel sounds normal  MS: no musculoskeletal defects are noted and gait is age appropriate without ataxia  SKIN: no suspicious lesions or rashes  NEURO: Normal strength and tone, sensory exam grossly normal, mentation intact and speech normal  PSYCH: mentation appears normal and affect normal/bright    Diagnostic Test Results:  Labs reviewed in Epic    ASSESSMENT / PLAN:   (Z00.00) Encounter for Medicare annual wellness exam  (primary encounter diagnosis)  Comment:   Plan:     (N18.31) Chronic kidney disease, stage 3a (H)  Comment: Adjust meds as indicated by above labs.   Plan:     (F32.0) Mild " "major depression (H24)  Comment: Stable no change in treatment plan.   Plan:     (I10) Essential hypertension  Comment: Stable no change in treatment plan.   Plan: Lipid panel reflex to direct LDL Non-fasting,         hydrochlorothiazide (HYDRODIURIL) 25 MG tablet,        losartan (COZAAR) 50 MG tablet, Comprehensive         metabolic panel, CBC with platelets            (E03.9) Hypothyroidism, unspecified type  Comment: Adjust meds as indicated by above labs.   Plan: TSH WITH FREE T4 REFLEX, levothyroxine         (SYNTHROID/LEVOTHROID) 125 MCG tablet            (K21.9) Gastroesophageal reflux disease without esophagitis  Comment: new sxs with swallowing will get scope   Plan: pantoprazole (PROTONIX) 40 MG EC tablet, Adult         GI  Referral - Procedure Only            (F51.04) Psychophysiological insomnia  Comment: Stable no change in treatment plan.   Plan: temazepam (RESTORIL) 15 MG capsule            (F41.9) Anxiety  Comment: Stable no change in treatment plan.   Plan: temazepam (RESTORIL) 15 MG capsule            Patient has been advised of split billing requirements and indicates understanding: Yes      COUNSELING:  Reviewed preventive health counseling, as reflected in patient instructions      BMI:   Estimated body mass index is 29.84 kg/m  as calculated from the following:    Height as of this encounter: 1.765 m (5' 9.5\").    Weight as of this encounter: 93 kg (205 lb).         She reports that she has never smoked. She has never used smokeless tobacco.      Appropriate preventive services were discussed with this patient, including applicable screening as appropriate for fall prevention, nutrition, physical activity, Tobacco-use cessation, weight loss and cognition.  Checklist reviewing preventive services available has been given to the patient.    Reviewed patients plan of care and provided an AVS. The Basic Care Plan (routine screening as documented in Health Maintenance) for Stephanie meets the " Care Plan requirement. This Care Plan has been established and reviewed with the Patient.          Radha Felton MD  [facility]    Identified Health Risks:  I have reviewed Opioid Use Disorder and Substance Use Disorder risk factors and made any needed referrals.   Answers submitted by the patient for this visit:  Patient Health Questionnaire (Submitted on 11/15/2023)  If you checked off any problems, how difficult have these problems made it for you to do your work, take care of things at home, or get along with other people?: Somewhat difficult  PHQ9 TOTAL SCORE: 5

## 2023-12-08 RX ORDER — ASCORBIC ACID 250 MG
250 TABLET,CHEWABLE ORAL DAILY
COMMUNITY

## 2023-12-12 ENCOUNTER — ANESTHESIA EVENT (OUTPATIENT)
Dept: GASTROENTEROLOGY | Facility: CLINIC | Age: 63
End: 2023-12-12
Payer: COMMERCIAL

## 2023-12-12 NOTE — ANESTHESIA PREPROCEDURE EVALUATION
Anesthesia Pre-Procedure Evaluation    Patient: Stephanie Trejo   MRN: [id number] : 1960        Procedure : Procedure(s):  Esophagoscopy, gastroscopy, duodenoscopy (EGD), combined          Past Medical History:   Diagnosis Date    CHF (congestive heart failure) (H) 2013    Depressive disorder     Hypertension     Mitral valve insufficiency, acquired     Other and unspecified disc disorder of lumbar region     Scoliosis (and kyphoscoliosis), idiopathic     Undiagnosed cardiac murmurs     Unspecified hypothyroidism       Past Surgical History:   Procedure Laterality Date    ABDOMEN SURGERY          BACK SURGERY      BIOPSY      COLONOSCOPY      COLONOSCOPY N/A 2020    Procedure: COLONOSCOPY;  Surgeon: Shaun Torrez MD;  Location: [facility]    ESOPHAGOSCOPY, GASTROSCOPY, DUODENOSCOPY (EGD), COMBINED N/A 2020    Procedure: ESOPHAGOGASTRODUODENOSCOPY, WITH BIOPSY;  Surgeon: Elsie Kincaid MD;  Location: [facility]    EYE SURGERY  23    Cataract right eye    GYN SURGERY      HERNIA REPAIR      SOFT TISSUE SURGERY      SURGICAL HISTORY OF -   1985        SURGICAL HISTORY OF -       Laparoscopy    SURGICAL HISTORY OF -   1983    Hernia Repair     SURGICAL HISTORY OF -   1998    Back Fusion T3 - L3      Allergies   Allergen Reactions    Amlodipine Besylate Swelling     Swelling in ankles, Racing pulse also    Ammonia Hives and Swelling    Lisinopril      Dizziness and headaches    Penicillins Rash      Social History     Tobacco Use    Smoking status: Never    Smokeless tobacco: Never   Substance Use Topics    Alcohol use: No      Wt Readings from Last 1 Encounters:   23 93 kg (205 lb)        Anesthesia Evaluation            ROS/MED HX  ENT/Pulmonary:     (+) sleep apnea,                                      Neurologic:       Cardiovascular:     (+)  hypertension- -   -  - -      CHF                                METS/Exercise Tolerance:      Hematologic:       Musculoskeletal:       GI/Hepatic:       Renal/Genitourinary:     (+) renal disease, type: CRI,            Endo:     (+)          thyroid problem,            Psychiatric/Substance Use:     (+) psychiatric history anxiety and depression       Infectious Disease:       Malignancy:       Other:            Physical Exam    Airway        Mallampati: II   TM distance: > 3 FB   Neck ROM: full   Mouth opening: > 3 cm    Respiratory Devices and Support  Comment: Not on oxygen currently       Dental  no notable dental history         Cardiovascular   cardiovascular exam normal          Pulmonary   pulmonary exam normal                OUTSIDE LABS:  CBC:   Lab Results   Component Value Date    WBC 5.0 11/16/2023    WBC 5.4 08/02/2022    HGB 14.6 11/16/2023    HGB 14.3 08/02/2022    HCT 43.9 11/16/2023    HCT 43.5 08/02/2022     11/16/2023     08/02/2022     BMP:   Lab Results   Component Value Date     11/16/2023     02/02/2023    POTASSIUM 4.1 11/16/2023    POTASSIUM 3.7 02/02/2023    CHLORIDE 99 11/16/2023    CHLORIDE 103 02/02/2023    CO2 30 (H) 11/16/2023    CO2 35 (H) 02/02/2023    BUN 14.7 11/16/2023    BUN 21.2 02/02/2023    CR 1.15 (H) 11/16/2023    CR 1.07 (H) 02/02/2023     (H) 11/16/2023    GLC 95 02/02/2023     COAGS:   Lab Results   Component Value Date    INR 1.3 (A) 08/01/2011     POC:   Lab Results   Component Value Date    BGM 93 11/19/2009     HEPATIC:   Lab Results   Component Value Date    ALBUMIN 4.7 11/16/2023    PROTTOTAL 7.6 11/16/2023    ALT 44 11/16/2023    AST 48 (H) 11/16/2023    ALKPHOS 124 11/16/2023    BILITOTAL 0.7 11/16/2023     OTHER:   Lab Results   Component Value Date    GABBIE 10.0 11/16/2023    MAG 2.3 06/27/2011    TSH 1.78 11/16/2023    T4 1.34 01/13/2020       Anesthesia Plan    ASA Status:  2       Anesthesia Type: General.   Induction: Intravenous, Propofol.   Maintenance: TIVA.        Consents    Anesthesia Plan(s) and associated  "risks, benefits, and realistic alternatives discussed. Questions answered and patient/representative(s) expressed understanding.     - Discussed: Risks, Benefits and Alternatives for BOTH SEDATION and the PROCEDURE were discussed     - Discussed with:  Patient            Postoperative Care    Pain management: IV analgesics, Oral pain medications, Multi-modal analgesia.   PONV prophylaxis: Ondansetron (or other 5HT-3), Dexamethasone or Solumedrol     Comments:    Other Comments: Patient aware of plan, what to expect, and potential risks. Timeout was performed before bringing the patient back to OR, and once again, patient was asked if they had any questions           KAY Ray CRNA    I have reviewed the pertinent notes and labs in the chart from the past 30 days and (re)examined the patient.  Any updates or changes from those notes are reflected in this note.              # Overweight: Estimated body mass index is 29.84 kg/m  as calculated from the following:    Height as of 11/16/23: 1.765 m (5' 9.5\").    Weight as of 11/16/23: 93 kg (205 lb).      "

## 2023-12-13 ENCOUNTER — HOSPITAL ENCOUNTER (OUTPATIENT)
Facility: CLINIC | Age: 63
Discharge: HOME | End: 2023-12-13
Attending: SURGERY | Admitting: SURGERY
Payer: COMMERCIAL

## 2023-12-13 ENCOUNTER — TELEPHONE (OUTPATIENT)
Dept: SURGERY | Facility: CLINIC | Age: 63
End: 2023-12-13
Payer: COMMERCIAL

## 2023-12-13 ENCOUNTER — ANESTHESIA (OUTPATIENT)
Dept: GASTROENTEROLOGY | Facility: CLINIC | Age: 63
End: 2023-12-13
Payer: COMMERCIAL

## 2023-12-13 VITALS
RESPIRATION RATE: 17 BRPM | HEIGHT: 70 IN | WEIGHT: 205 LBS | DIASTOLIC BLOOD PRESSURE: 93 MMHG | BODY MASS INDEX: 29.35 KG/M2 | SYSTOLIC BLOOD PRESSURE: 120 MMHG | OXYGEN SATURATION: 93 % | HEART RATE: 64 BPM | TEMPERATURE: 98.2 F

## 2023-12-13 DIAGNOSIS — K21.00 GASTROESOPHAGEAL REFLUX DISEASE WITH ESOPHAGITIS WITHOUT HEMORRHAGE: Primary | ICD-10-CM

## 2023-12-13 LAB — UPPER GI ENDOSCOPY: NORMAL

## 2023-12-13 PROCEDURE — 370N000017 HC ANESTHESIA TECHNICAL FEE, PER MIN: Performed by: SURGERY

## 2023-12-13 PROCEDURE — 250N000009 HC RX 250

## 2023-12-13 PROCEDURE — 250N000009 HC RX 250: Performed by: NURSE ANESTHETIST, CERTIFIED REGISTERED

## 2023-12-13 PROCEDURE — 250N000011 HC RX IP 250 OP 636

## 2023-12-13 PROCEDURE — 88305 TISSUE EXAM BY PATHOLOGIST: CPT | Mod: 26 | Performed by: PATHOLOGY

## 2023-12-13 PROCEDURE — 43239 EGD BIOPSY SINGLE/MULTIPLE: CPT | Performed by: SURGERY

## 2023-12-13 PROCEDURE — 258N000003 HC RX IP 258 OP 636: Performed by: NURSE ANESTHETIST, CERTIFIED REGISTERED

## 2023-12-13 PROCEDURE — 88305 TISSUE EXAM BY PATHOLOGIST: CPT | Mod: TC | Performed by: SURGERY

## 2023-12-13 RX ORDER — SODIUM CHLORIDE, SODIUM LACTATE, POTASSIUM CHLORIDE, CALCIUM CHLORIDE 600; 310; 30; 20 MG/100ML; MG/100ML; MG/100ML; MG/100ML
INJECTION, SOLUTION INTRAVENOUS CONTINUOUS
Status: DISCONTINUED | OUTPATIENT
Start: 2023-12-13 | End: 2023-12-13 | Stop reason: HOSPADM

## 2023-12-13 RX ORDER — NALOXONE HYDROCHLORIDE 0.4 MG/ML
0.4 INJECTION, SOLUTION INTRAMUSCULAR; INTRAVENOUS; SUBCUTANEOUS
Status: CANCELLED | OUTPATIENT
Start: 2023-12-13

## 2023-12-13 RX ORDER — DEXAMETHASONE SODIUM PHOSPHATE 4 MG/ML
4 INJECTION, SOLUTION INTRA-ARTICULAR; INTRALESIONAL; INTRAMUSCULAR; INTRAVENOUS; SOFT TISSUE
Status: DISCONTINUED | OUTPATIENT
Start: 2023-12-13 | End: 2023-12-13 | Stop reason: HOSPADM

## 2023-12-13 RX ORDER — NALOXONE HYDROCHLORIDE 0.4 MG/ML
0.2 INJECTION, SOLUTION INTRAMUSCULAR; INTRAVENOUS; SUBCUTANEOUS
Status: CANCELLED | OUTPATIENT
Start: 2023-12-13

## 2023-12-13 RX ORDER — ONDANSETRON 4 MG/1
4 TABLET, ORALLY DISINTEGRATING ORAL EVERY 30 MIN PRN
Status: DISCONTINUED | OUTPATIENT
Start: 2023-12-13 | End: 2023-12-13 | Stop reason: HOSPADM

## 2023-12-13 RX ORDER — PROPOFOL 10 MG/ML
INJECTION, EMULSION INTRAVENOUS PRN
Status: DISCONTINUED | OUTPATIENT
Start: 2023-12-13 | End: 2023-12-13

## 2023-12-13 RX ORDER — ONDANSETRON 2 MG/ML
4 INJECTION INTRAMUSCULAR; INTRAVENOUS EVERY 30 MIN PRN
Status: DISCONTINUED | OUTPATIENT
Start: 2023-12-13 | End: 2023-12-13 | Stop reason: HOSPADM

## 2023-12-13 RX ORDER — LIDOCAINE 40 MG/G
CREAM TOPICAL
Status: DISCONTINUED | OUTPATIENT
Start: 2023-12-13 | End: 2023-12-13 | Stop reason: HOSPADM

## 2023-12-13 RX ORDER — SUCRALFATE ORAL 1 G/10ML
1 SUSPENSION ORAL 4 TIMES DAILY
Qty: 560 ML | Refills: 0 | Status: SHIPPED | OUTPATIENT
Start: 2023-12-13 | End: 2023-12-27

## 2023-12-13 RX ORDER — FLUMAZENIL 0.1 MG/ML
0.2 INJECTION, SOLUTION INTRAVENOUS
Status: CANCELLED | OUTPATIENT
Start: 2023-12-13 | End: 2023-12-13

## 2023-12-13 RX ORDER — ALBUTEROL SULFATE 0.83 MG/ML
2.5 SOLUTION RESPIRATORY (INHALATION) EVERY 4 HOURS PRN
Status: DISCONTINUED | OUTPATIENT
Start: 2023-12-13 | End: 2023-12-13 | Stop reason: HOSPADM

## 2023-12-13 RX ORDER — ONDANSETRON 2 MG/ML
4 INJECTION INTRAMUSCULAR; INTRAVENOUS
Status: DISCONTINUED | OUTPATIENT
Start: 2023-12-13 | End: 2023-12-13 | Stop reason: HOSPADM

## 2023-12-13 RX ADMIN — SODIUM CHLORIDE, POTASSIUM CHLORIDE, SODIUM LACTATE AND CALCIUM CHLORIDE: 600; 310; 30; 20 INJECTION, SOLUTION INTRAVENOUS at 07:39

## 2023-12-13 RX ADMIN — TOPICAL ANESTHETIC 2 SPRAY: 200 SPRAY DENTAL; PERIODONTAL at 08:18

## 2023-12-13 RX ADMIN — PROPOFOL 100 MG: 10 INJECTION, EMULSION INTRAVENOUS at 08:18

## 2023-12-13 RX ADMIN — PROPOFOL 100 MG: 10 INJECTION, EMULSION INTRAVENOUS at 08:26

## 2023-12-13 RX ADMIN — PROPOFOL 100 MG: 10 INJECTION, EMULSION INTRAVENOUS at 08:21

## 2023-12-13 RX ADMIN — LIDOCAINE HYDROCHLORIDE 0.2 ML: 10 INJECTION, SOLUTION EPIDURAL; INFILTRATION; INTRACAUDAL; PERINEURAL at 07:39

## 2023-12-13 ASSESSMENT — ACTIVITIES OF DAILY LIVING (ADL): ADLS_ACUITY_SCORE: 35

## 2023-12-13 NOTE — TELEPHONE ENCOUNTER
Sucralfate 1gm/10ml Susp.  ID- [id number]   [facility]  [phone]    Thank you,  Emperatriz Lopez, Pharmacy Technician   [facility]

## 2023-12-13 NOTE — TELEPHONE ENCOUNTER
Prior Authorization Approval    Authorization Effective Date: 12/13/2023  Authorization Expiration Date: 12/13/2024  Medication: Sucralfate susp.-APPROVED  Reference #:     Insurance Company: [employer] Clinical Review - Phone [phone] Fax [phone]  Which Pharmacy is filling the prescription (Not needed for infusion/clinic administered): [facility] [place], [place] - [address]  Pharmacy Notified: Yes  Patient Notified: Instructed pharmacy to notify patient when script is ready to /ship.

## 2023-12-13 NOTE — PROGRESS NOTES
[facility] DISCHARGE NOTE    Patient discharged to home at 9:12 AM via ambulation. Accompanied by spouse and staff. Discharge instructions reviewed with patient and spouse, opportunity offered to ask questions. Prescriptions sent to patients preferred pharmacy. All belongings sent with patient. Carafate sent to pharmacy requested by patient    Dennise Lo RN

## 2023-12-13 NOTE — LETTER
Stephanie Trejo  [address] [address] [address]  December 21, 2023    Dear Stephanie,   This letter is to inform you of the results of your pathology report on your upper endoscopy (EGD).    Your pathology report was:  Showed findings consistent with reflux (heartburn).    Final Diagnosis   A. Stomach, biopsies:  --Gastric mucosa with no significant histopathologic change.  --Negative for Helicobacter pylori organisms or dysplasia.       B.  Stomach, polyps, biopsy:  -- Benign gastric mucosa with focally ectatic glands, suggestive of fundic gland polyp.     C. Gastroesophageal junction, biopsies:   --Squamocolumnar mucosa with no significant histopathologic change.   --Negative for dysplasia or specialized intestinal metaplasia.     D. Esophagus, distal, biopsies:  --No significant histopathologic change.       If you have any questions or concerns please do not hesitate to call my office at [phone].  Sincerely,   Jeb Way, DO   [facility]

## 2023-12-13 NOTE — ANESTHESIA CARE TRANSFER NOTE
Patient: Stephanie Trejo    Procedure: Procedure(s):  ESOPHAGOGASTRODUODENOSCOPY, WITH BIOPSY       Diagnosis: GERD without esophagitis [K21.9]  Diagnosis Additional Information: No value filed.    Anesthesia Type:   No value filed.     Note:    Oropharynx: oropharynx clear of all foreign objects  Level of Consciousness: drowsy      Independent Airway: airway patency satisfactory and stable  Dentition: dentition unchanged  Vital Signs Stable: post-procedure vital signs reviewed and stable  Report to RN Given: handoff report given  Patient transferred to: Phase II    Handoff Report: Identifed the Patient, Identified the Reponsible Provider, Reviewed the pertinent medical history, Discussed the surgical course, Reviewed Intra-OP anesthesia mangement and issues during anesthesia, Set expectations for post-procedure period and Allowed opportunity for questions and acknowledgement of understanding      Vitals:  Vitals Value Taken Time   /69 12/13/23 0834   Temp 36.8  C (98.2  F) 12/13/23 0834   Pulse 59 12/13/23 0834   Resp 16 12/13/23 0834   SpO2 95 % 12/13/23 0837   Vitals shown include unfiled device data.    Electronically Signed By: KAY Foster CRNA  December 13, 2023  8:38 AM

## 2023-12-13 NOTE — ANESTHESIA POSTPROCEDURE EVALUATION
Patient: Stephanie Trejo    Procedure: Procedure(s):  ESOPHAGOGASTRODUODENOSCOPY, WITH BIOPSY       Anesthesia Type:  No value filed.    Note:  Disposition: Outpatient   Postop Pain Control: Uneventful            Sign Out: Well controlled pain   PONV: No   Neuro/Psych: Uneventful            Sign Out: Acceptable/Baseline neuro status   Airway/Respiratory: Uneventful            Sign Out: Acceptable/Baseline resp. status   CV/Hemodynamics: Uneventful            Sign Out: Acceptable CV status; No obvious hypovolemia; No obvious fluid overload   Other NRE:    DID A NON-ROUTINE EVENT OCCUR?            Last vitals:  Vitals Value Taken Time   /69 12/13/23 0834   Temp 36.8  C (98.2  F) 12/13/23 0834   Pulse 59 12/13/23 0834   Resp 16 12/13/23 0834   SpO2 95 % 12/13/23 0837   Vitals shown include unfiled device data.    Electronically Signed By: KAY Foster CRNA  December 13, 2023  8:38 AM

## 2023-12-13 NOTE — TELEPHONE ENCOUNTER
Central Prior Authorization Team   Phone: [phone]    PA Initiation    Medication: SUCRALFATE 1 GM/10ML PO SUSP  Insurance Company: [email or web address] Clinical Review - Phone [phone] Fax [phone]  Pharmacy Filling the Rx: [facility] [place], [place] - [address]  Filling Pharmacy Phone: [phone]  Filling Pharmacy Fax:    Start Date: 12/13/2023

## 2023-12-13 NOTE — H&P
[facility]    Pre-Endoscopy History and Physical     Stephanie Trejo MRN# [id number]   YOB: 1960 Age: 63 year old     Date of Procedure: 2023  Primary care provider: Radha Felton  Type of Endoscopy: Gastroscopy with possible biopsy, possible dilation  Reason for Procedure: Dysphagia  Type of Anesthesia Anticipated: Conscious Sedation    HPI:    Stephanie is a 63 year old female who will be undergoing the above procedure.      Ongoing last 3 months.  Liquids and solids.  She states she sneezes and it improves.  Long standing history of reflux.  Takes PPI with improvement.  Last EGD .    A history and physical has been performed. The patient's medications and allergies have been reviewed. The risks and benefits of the procedure and the sedation options and risks were discussed with the patient.  All questions were answered and informed consent was obtained.      She denies a personal or family history of anesthesia complications or bleeding disorders.     Patient Active Problem List   Diagnosis    Hypothyroidism    Essential hypertension    CAMELIA (obstructive sleep apnea)    Mitral valve regurgitation    Abnormal uterine bleeding    Mild major depression (H24)    Counseling regarding advanced directives    Anxiety    Chronic kidney disease, stage 3a (H)        Past Medical History:   Diagnosis Date    CHF (congestive heart failure) (H) 2013    Depressive disorder     Hypertension     Mitral valve insufficiency, acquired     Other and unspecified disc disorder of lumbar region     Scoliosis (and kyphoscoliosis), idiopathic     Undiagnosed cardiac murmurs     Unspecified hypothyroidism         Past Surgical History:   Procedure Laterality Date    ABDOMEN SURGERY          BACK SURGERY      BIOPSY      COLONOSCOPY      COLONOSCOPY N/A 2020    Procedure: COLONOSCOPY;  Surgeon: Shaun Torrez MD;  Location: [facility]    ESOPHAGOSCOPY, GASTROSCOPY,  DUODENOSCOPY (EGD), COMBINED N/A 2020    Procedure: ESOPHAGOGASTRODUODENOSCOPY, WITH BIOPSY;  Surgeon: Elsie Kincaid MD;  Location: [facility]    EYE SURGERY  23    Cataract right eye    GYN SURGERY      HERNIA REPAIR      SOFT TISSUE SURGERY      SURGICAL HISTORY OF -   1985        SURGICAL HISTORY OF -       Laparoscopy    SURGICAL HISTORY OF -   1983    Hernia Repair     SURGICAL HISTORY OF -   1998    Back Fusion T3 - L3       Social History     Tobacco Use    Smoking status: Never    Smokeless tobacco: Never   Substance Use Topics    Alcohol use: No       Family History   Problem Relation Age of Onset    Lipids Mother     Thyroid Disease Mother     Eye Disorder Mother         macular degeneration    Colon Cancer Mother     Anesthesia Reaction Mother         allergic to some    Hypertension Mother     Cerebrovascular Disease Mother     Hypertension Father     Cardiovascular Father         MI    Osteoporosis Father     Alzheimer Disease Father     Genitourinary Problems Father         incontinence    Breast Cancer Maternal Grandmother     Cerebrovascular Disease Maternal Grandfather     Diabetes Paternal Grandmother     Gastrointestinal Disease Sister         colitis    Respiratory Daughter         asthma       Prior to Admission medications    Medication Sig Start Date End Date Taking? Authorizing Provider   ascorbic acid (VITAMIN C) 250 MG CHEW chewable tablet Take 250 mg by mouth daily   Yes Reported, Patient   atorvastatin (LIPITOR) 20 MG tablet Take 1 tablet (20 mg) by mouth daily 23  Yes Radha Felton MD   cyclobenzaprine (FLEXERIL) 10 MG tablet Take 1 tablet (10 mg) by mouth 3 times daily as needed for muscle spasms 22  Yes Radha Felton MD   DULoxetine (CYMBALTA) 20 MG capsule Take 1 capsule (20 mg) by mouth 2 times daily 23  Yes Radha Felton MD   hydrochlorothiazide (HYDRODIURIL) 25 MG tablet Take 1 tablet (25 mg) by mouth  "daily 11/16/23  Yes Radha Felton MD   ibuprofen (ADVIL/MOTRIN) 600 MG tablet  6/12/20  Yes Reported, Patient   levothyroxine (SYNTHROID/LEVOTHROID) 125 MCG tablet TAKE 1 TABLET BY MOUTH ONCE DAILY . 11/16/23  Yes Radha Felton MD   losartan (COZAAR) 50 MG tablet Take 1 tablet (50 mg) by mouth daily 11/16/23  Yes Radha Felton MD   Multiple Vitamins-Minerals (MULTIVITAMIN GUMMIES WOMENS) CHEW Take 1 tablet by mouth daily 8/5/22  Yes Radha Felton MD   pantoprazole (PROTONIX) 40 MG EC tablet TAKE 1 TABLET BY MOUTH ONCE DAILY TAKE  60  MINUTES  BEFORE  ANY  FOOD  OR  DRINK  INTAKE 11/16/23  Yes Radha Felton MD   temazepam (RESTORIL) 15 MG capsule TAKE 1 TO 2 CAPSULES BY MOUTH NIGHTLY AS NEEDED FOR SLEEP 11/16/23  Yes Radha Felton MD   vitamin D3 (CHOLECALCIFEROL) 50 mcg (2000 units) tablet Take 1 tablet (50 mcg) by mouth daily 8/5/22  Yes Radha Felton MD       Allergies   Allergen Reactions    Amlodipine Besylate Swelling     Swelling in ankles, Racing pulse also    Ammonia Hives and Swelling    Lisinopril      Dizziness and headaches    Penicillins Rash        REVIEW OF SYSTEMS:   5 point ROS negative except as noted above in HPI, including Gen., Resp., CV, GI &  system review.    PHYSICAL EXAM:   BP (!) 145/71 (BP Location: Left arm)   Pulse 64   Temp 98  F (36.7  C) (Oral)   Ht 1.765 m (5' 9.5\")   Wt 93 kg (205 lb)   LMP 09/21/2012   SpO2 96%   BMI 29.84 kg/m   Estimated body mass index is 29.84 kg/m  as calculated from the following:    Height as of this encounter: 1.765 m (5' 9.5\").    Weight as of this encounter: 93 kg (205 lb).   Constitutional: Awake, alert, no acute distress.  Eyes: No scleral icterus.  Conjunctiva are without injection.  ENMT: Mucous membranes moist, dentition and gums are intact.   Neck: Soft, supple, trachea midline.    Endocrine: n/a   Lymphatic: There is no cervical, submandibularadenopathy.  Respiratory: normal " effortgs   Cardiovascular: S1, S2  Abdomen: Non-distended, non-tender,  No masses,  Musculoskeletal: No spinal or CVA tenderness. Full range of motion in the upper and lower extremities.    Skin: No skin rashes or lesions to inspection.  No petechia.    Neurologic: alerted and oriented 3x  Psychiatric: The patient's affect is not blunted and mood is appropriate.  DIAGNOSTICS:    Not indicated    IMPRESSION   ASA Class 3 - Severe systemic disease, but not incapacitating    PLAN:   Plan for Gastroscopy with possible biopsy, possible dilation. We discussed the risks, benefits and alternatives and the patient wished to proceed.  Patient is cleared for the above procedure.    Discussed risk of bleeding, perforation, missed lesion, anesthetic issues.    The above has been forwarded to the consulting provider.    Jeb Way DO  [facility]

## 2023-12-14 LAB
PATH REPORT.COMMENTS IMP SPEC: NORMAL
PATH REPORT.COMMENTS IMP SPEC: NORMAL
PATH REPORT.FINAL DX SPEC: NORMAL
PATH REPORT.GROSS SPEC: NORMAL
PATH REPORT.MICROSCOPIC SPEC OTHER STN: NORMAL
PATH REPORT.RELEVANT HX SPEC: NORMAL
PHOTO IMAGE: NORMAL

## 2024-01-17 ENCOUNTER — OFFICE VISIT (OUTPATIENT)
Dept: AUDIOLOGY | Facility: CLINIC | Age: 64
End: 2024-01-17
Payer: COMMERCIAL

## 2024-01-17 DIAGNOSIS — H61.22 IMPACTED CERUMEN OF LEFT EAR: Primary | ICD-10-CM

## 2024-01-17 NOTE — PROGRESS NOTES
AUDIOLOGY REPORT    SUBJECTIVE:  Stephanie Trejo is a 63 year old female who was seen in the Audiology Clinic at the [facility] for audiologic evaluation, referred by Radha Felton M.D. .The patient has been seen previously in this clinic on 10/23/2007 for assessment and results indicated mild sensorineural hearing loss bilaterally. The patient reports greater difficulty hearing the TV and with her spouse. The patient denies  bilateral tinnitus, bilateral otalgia, and history of noise exposure.  The patient notes difficulty with communication in a variety of listening situations.  They were accompanied today by their self.      OBJECTIVE:  Abuse Screening:  Do you feel unsafe at home or work/school? No  Do you feel threatened by someone? No  Does anyone try to keep you from having contact with others, or doing things outside of your home? No  Physical signs of abuse present? No     Fall Risk Screen:  1. Have you fallen two or more times in the past year? No  2. Have you fallen and had an injury in the past year? No        Otoscopic exam indicates Left ear occluded with cerumen,right ear clear. Attempted to remove cerumen with lighted curette but was no able to clean well enough for testing.         ASSESSMENT:     ICD-10-CM    1. Encounter for Medicare annual wellness exam  Z00.00 Adult Audiology  Referral          Today s results were discussed with the patient in detail.     PLAN:  It is recommended that the patient be seen by an ENT for cleaning of her ears and return to clinic for a hearing evaluation.  Please call this clinic with questions regarding these results or recommendations.        Ingrid RAGSDALE-MONIQUE, #[id number]

## 2024-02-02 ENCOUNTER — TRANSFERRED RECORDS (OUTPATIENT)
Dept: HEALTH INFORMATION MANAGEMENT | Facility: CLINIC | Age: 64
End: 2024-02-02
Payer: COMMERCIAL

## 2024-02-06 ENCOUNTER — LAB (OUTPATIENT)
Dept: LAB | Facility: CLINIC | Age: 64
End: 2024-02-06
Payer: COMMERCIAL

## 2024-02-06 DIAGNOSIS — E78.5 HYPERLIPIDEMIA LDL GOAL <100: ICD-10-CM

## 2024-02-06 DIAGNOSIS — N18.31 CHRONIC KIDNEY DISEASE, STAGE 3A (H): Primary | ICD-10-CM

## 2024-02-06 LAB
CHOLEST SERPL-MCNC: 137 MG/DL
CREAT UR-MCNC: 142.6 MG/DL
FASTING STATUS PATIENT QL REPORTED: YES
HDLC SERPL-MCNC: 63 MG/DL
LDLC SERPL CALC-MCNC: 54 MG/DL
MICROALBUMIN UR-MCNC: <12 MG/L
MICROALBUMIN/CREAT UR: NORMAL MG/G{CREAT}
NONHDLC SERPL-MCNC: 74 MG/DL
TRIGL SERPL-MCNC: 99 MG/DL

## 2024-02-06 PROCEDURE — 82043 UR ALBUMIN QUANTITATIVE: CPT

## 2024-02-06 PROCEDURE — 80061 LIPID PANEL: CPT

## 2024-02-06 PROCEDURE — 82570 ASSAY OF URINE CREATININE: CPT

## 2024-02-06 PROCEDURE — 36415 COLL VENOUS BLD VENIPUNCTURE: CPT

## 2024-02-27 ENCOUNTER — MYC MEDICAL ADVICE (OUTPATIENT)
Dept: FAMILY MEDICINE | Facility: CLINIC | Age: 64
End: 2024-02-27
Payer: COMMERCIAL

## 2024-02-27 DIAGNOSIS — F51.04 PSYCHOPHYSIOLOGICAL INSOMNIA: ICD-10-CM

## 2024-02-27 DIAGNOSIS — F41.9 ANXIETY: ICD-10-CM

## 2024-02-27 RX ORDER — TEMAZEPAM 15 MG/1
15 CAPSULE ORAL
Qty: 90 CAPSULE | Refills: 1 | Status: SHIPPED | OUTPATIENT
Start: 2024-02-27 | End: 2024-03-28

## 2024-02-29 ENCOUNTER — OFFICE VISIT (OUTPATIENT)
Dept: AUDIOLOGY | Facility: CLINIC | Age: 64
End: 2024-02-29
Payer: COMMERCIAL

## 2024-02-29 DIAGNOSIS — H90.3 SENSORINEURAL HEARING LOSS, BILATERAL: Primary | ICD-10-CM

## 2024-02-29 PROCEDURE — 92550 TYMPANOMETRY & REFLEX THRESH: CPT | Performed by: AUDIOLOGIST

## 2024-02-29 PROCEDURE — 92557 COMPREHENSIVE HEARING TEST: CPT | Performed by: AUDIOLOGIST

## 2024-02-29 NOTE — PROGRESS NOTES
AUDIOLOGY REPORT    SUBJECTIVE:  Stephanie Trejo is a 63 year old female who was seen in the Audiology Clinic [facility] on 2/29/24 for audiologic evaluation, referred by Radha Felton MD.  The patient has been seen previously in this clinic on 10/23/2007 for assessment and results indicated bilateral sensorineural hearing loss. The patient reports a decline in hearing. The patient denies  bilateral tinnitus, bilateral otalgia, bilateral drainage, bilateral aural fullness, family history of hearing loss, history of noise exposure, and dizziness. Patient was unaccompanied to today's visit.     Abuse Screening:  Do you feel unsafe at home or work/school? No  Do you feel threatened by someone? No  Does anyone try to keep you from having contact with others, or doing things outside of your home? No  Physical signs of abuse present? No     Fall Risk Screen:  1. Have you fallen two or more times in the past year? No  2. Have you fallen and had an injury in the past year? No    OBJECTIVE:    Otoscopic exam indicates ears are clear of cerumen bilaterally     Pure Tone Thresholds assessed using standard techniques  audiometry with good  reliability from 250-8000 Hz bilaterally using insert earphones and circumaural headphones     RIGHT:  mild-moderate sensorineural hearing loss    LEFT:    mild-moderate sensorineural hearing loss   NOTE: Change in transducers did not merit a change in thresholds.     Tympanogram:    RIGHT: normal eardrum mobility    LEFT:   normal eardrum mobility    Reflexes (reported by stimulus ear): 1000 Hz  RIGHT: Ipsilateral is could not maintain seal   RIGHT: Contralateral is absent at frequencies tested  LEFT:   Ipsilateral is present at normal levels  LEFT:   Contralateral is could not maintain seal     Speech Reception Threshold:    RIGHT: 35 dB HL    LEFT:   35 dB HL    Word Recognition Score:     RIGHT: 96% at 75 dB HL using NU-6 recorded word list.    LEFT:    100% at 75 dB HL using NU-6 recorded word list.    ASSESSMENT:   Bilateral sensorineural hearing loss      Compared to patient's previous audiogram dated 10/23/2007, hearing has declined bilaterally. Today s results were discussed with the patient in detail. Patient was provided a copy of her audiogram.     PLAN:  Patient was counseled regarding hearing loss and impact on communication.  Patient is a good candidate for amplification at this time. Handout on good communication strategies, and hearing aid use was given to patient. It is recommended that the patient explore amplification.  Please call this clinic with questions regarding these results or recommendations.    Jeb Loco [facility]  Licensed Audiologist #[id number]  2/29/2024    CC: Dr. Felton

## 2024-03-28 ENCOUNTER — TELEPHONE (OUTPATIENT)
Dept: FAMILY MEDICINE | Facility: CLINIC | Age: 64
End: 2024-03-28
Payer: COMMERCIAL

## 2024-03-28 DIAGNOSIS — F51.04 PSYCHOPHYSIOLOGICAL INSOMNIA: ICD-10-CM

## 2024-03-28 DIAGNOSIS — F41.9 ANXIETY: ICD-10-CM

## 2024-03-28 RX ORDER — TEMAZEPAM 15 MG/1
15 CAPSULE ORAL
Qty: 90 CAPSULE | Refills: 1 | Status: SHIPPED | OUTPATIENT
Start: 2024-03-28 | End: 2024-06-03

## 2024-03-28 NOTE — TELEPHONE ENCOUNTER
Patient calling. Needing new RX for her Temazepam. Insurance will not cover medication with Directions to take 1-2 capsules by mouth. Needing directions to state for patient to only take 1 capsule nightly     Preferred Pharmacy:   [facility] [address] [place], [place] - [address]   [address] [facility] [address]  Phone: [phone] Fax: [phone]      Could we send this information to you in [employer] [employer] or would you prefer to receive a phone call?:   Patient would prefer a phone call   Okay to leave a detailed message?: Yes at Home number on file [phone] (home)

## 2024-04-20 DIAGNOSIS — F32.0 MILD MAJOR DEPRESSION (H): ICD-10-CM

## 2024-04-22 RX ORDER — DULOXETIN HYDROCHLORIDE 20 MG/1
20 CAPSULE, DELAYED RELEASE ORAL 2 TIMES DAILY
Qty: 180 CAPSULE | Refills: 3 | Status: SHIPPED | OUTPATIENT
Start: 2024-04-22

## 2024-04-22 NOTE — TELEPHONE ENCOUNTER
"Requested Prescriptions   Pending Prescriptions Disp Refills    DULoxetine (CYMBALTA) 20 MG capsule [Pharmacy Med Name: DULoxetine HCl 20 MG Oral Capsule Delayed Release Particles] 60 capsule 0     Sig: Take 1 capsule by mouth twice daily       Serotonin-Norepinephrine Reuptake Inhibitors  Failed - 4/20/2024  6:02 AM        Failed - Blood pressure under 140/90 in past 12 months     BP Readings from Last 3 Encounters:   12/13/23 (!) 120/93   11/16/23 110/80   08/10/23 (!) 140/84       No data recorded            Failed - PHQ-9 score of less than 5 in past 6 months     Please review last PHQ-9 score.           Passed - Medication is active on med list        Passed - Patient is age 18 or older        Passed - No active pregnancy on record        Passed - No positive pregnancy test in past 12 months        Passed - Recent (6 mo) or future (30 days) visit within the authorizing provider's specialty     Patient had office visit in the last 6 months or has a visit in the next 30 days with authorizing provider or within the authorizing provider's specialty.  See \"Patient Info\" tab in inbasket, or \"Choose Columns\" in Meds & Orders section of the refill encounter.                 "

## 2024-06-03 DIAGNOSIS — F41.9 ANXIETY: ICD-10-CM

## 2024-06-03 DIAGNOSIS — F51.04 PSYCHOPHYSIOLOGICAL INSOMNIA: ICD-10-CM

## 2024-06-03 RX ORDER — TEMAZEPAM 15 MG/1
15 CAPSULE ORAL
Qty: 90 CAPSULE | Refills: 1 | Status: SHIPPED | OUTPATIENT
Start: 2024-06-03

## 2024-06-03 NOTE — TELEPHONE ENCOUNTER
Stephanie tried to refill her Restoril and pharmacy told her it was denied by this office and they need a new Rx sent in- she should have refills through June but they said they do not have that.

## 2024-06-24 ENCOUNTER — OFFICE VISIT (OUTPATIENT)
Dept: DERMATOLOGY | Facility: CLINIC | Age: 64
End: 2024-06-24
Payer: COMMERCIAL

## 2024-06-24 DIAGNOSIS — D48.5 NEOPLASM OF UNCERTAIN BEHAVIOR OF SKIN: ICD-10-CM

## 2024-06-24 DIAGNOSIS — D22.9 NEVUS: Primary | ICD-10-CM

## 2024-06-24 DIAGNOSIS — L81.4 LENTIGO: ICD-10-CM

## 2024-06-24 DIAGNOSIS — L82.1 SEBORRHEIC KERATOSIS: ICD-10-CM

## 2024-06-24 DIAGNOSIS — L82.0 INFLAMED SEBORRHEIC KERATOSIS: ICD-10-CM

## 2024-06-24 DIAGNOSIS — D18.01 ANGIOMA OF SKIN: ICD-10-CM

## 2024-06-24 PROCEDURE — 17110 DESTRUCTION B9 LES UP TO 14: CPT | Performed by: PHYSICIAN ASSISTANT

## 2024-06-24 PROCEDURE — 99213 OFFICE O/P EST LOW 20 MIN: CPT | Mod: 25 | Performed by: PHYSICIAN ASSISTANT

## 2024-06-24 PROCEDURE — 88305 TISSUE EXAM BY PATHOLOGIST: CPT | Performed by: DERMATOLOGY

## 2024-06-24 PROCEDURE — 11102 TANGNTL BX SKIN SINGLE LES: CPT | Mod: 59 | Performed by: PHYSICIAN ASSISTANT

## 2024-06-24 NOTE — PROGRESS NOTES
HPI:   Chief complaints: Stephanie Trejo is a pleasant 63 year old female who presents for Full skin cancer screening to rule out skin cancer   Last Skin Exam: 1 year ago      1st Baseline: no  Personal HX of Skin Cancer: no   Personal HX of Malignant Melanoma: no   Family HX of Skin Cancer / Malignant Melanoma: no  Personal HX of Atypical Moles:   no  Risk factors: history of sun exposure and burns  New / Changing lesions:none  Social History: has 2 children and 3 grandchildren. Daughter and 1 granson live in [place]  On review of systems, there are no further skin complaints, patient is feeling otherwise well.   ROS of the following were done and are negative: Constitutional, Eyes, Ears, Nose,   Mouth, Throat, Cardiovascular, Respiratory, GI, Genitourinary, Musculoskeletal,   Psychiatric, Endocrine, Allergic/Immunologic.    PHYSICAL EXAM:   LMP 09/21/2012   Skin exam performed as follows: Type 2 skin. Mood appropriate  Alert and Oriented X 3. Well developed, well nourished in no distress.  General appearance: Normal  Head including face: Normal  Eyes: conjunctiva and lids: Normal  Mouth: Lips, teeth, gums: Normal  Neck: Normal  Chest-breast/axillae: Normal  Back: Normal  Extremities: digits/nails (clubbing): Normal  Eccrine and Apocrine glands: Normal  Right upper extremity: Normal  Left upper extremity: Normal  Right lower extremity: Normal  Left lower extremity: Normal  Skin: Scalp and body hair: See below    Pt deferred exam of breasts, groin, buttocks: No    Other physical findings:  1. Multiple pigmented macules on extremities and trunk  2. Multiple pigmented macules on face, trunk and extremities  3. Multiple vascular papules on trunk, arms and legs  4. Multiple scattered keratotic plaques  5. 5 x 2 mm irregular brown macule on the left great toe  6. Inflamed keratotic papules on the back x 14  7. Pearly papule on the right upper eyelid        Except as noted above, no other signs of skin cancer or melanoma.      ASSESSMENT/PLAN:   Benign Full skin cancer screening today. . Patient with history of none  Advised on monthly self exams and 1 year  Patient Education: Appropriate brochures given.    Multiple benign appearing melanocytic nevi on arms, legs and trunk. Discussed ABCDEs of melanoma and sunscreen.   Multiple lentigos on arms, legs and trunk. Advised benign, no treatment needed.  Multiple scattered angiomas. Advised benign, no treatment needed.   Seborrheic keratosis on arms, legs and trunk. Advised benign, no treatment needed.  R/o atypical nevus on the left great toe. Shave biopsy performed.  Area cleaned and anesthetized with 1% lidocaine with epinephrine.  Dermablade used to remove the lesion and sent to pathology. Bleeding was cauterized. Pt tolerated procedure well with no complications.   Inflamed seborrheic keratosis on the back x 14. As physically tender cryosurgery performed. Advised on post op care.   Milia on the right upper eyelid - extraction attempted and unable to extract the milia (no charge for this).           Follow-up: yearly/PRN sooner    1.) Patient was asked about new and changing moles. YES  2.) Patient received a complete physical skin examination: YES  3.) Patient was counseled to perform a monthly self skin examination: YES  Scribed By: Payal Martinez MS, PA-C

## 2024-06-24 NOTE — PATIENT INSTRUCTIONS
Wound Care Instructions     FOR SUPERFICIAL WOUNDS     [facility]  [phone]                 AFTER 24 HOURS YOU SHOULD REMOVE THE BANDAGE AND BEGIN DAILY DRESSING CHANGES AS FOLLOWS:     1) Remove Dressing.     2) Clean and dry the area with tap water using a Q-tip or sterile gauze pad.     3) Apply Vaseline, Aquaphor, Polysporin ointment or Bacitracin ointment over entire wound.  Do NOT use Neosporin ointment.     4) Cover the wound with a band-aid, or a sterile non-stick gauze pad and micropore paper tape    REPEAT THESE INSTRUCTIONS AT LEAST ONCE A DAY UNTIL THE WOUND HAS COMPLETELY HEALED.    It is an old wives tale that a wound heals better when it is exposed to air and allowed to dry out. The wound will heal faster with a better cosmetic result if it is kept moist with ointment and covered with a bandage.    **Do not let the wound dry out.**    Supplies Needed:      *Cotton tipped applicators (Q-tips)    *Vaseline, Aquaphor, Polysporin or Bacitracin Ointment (NOT NEOSPORIN)    *Band-aids or non-stick gauze pads and micropore paper tape.      PATIENT INFORMATION:    During the healing process you will notice a number of changes. All wounds develop a small halo of redness surrounding the wound.  This means healing is occurring. Severe itching with extensive redness usually indicates sensitivity to the ointment or bandage tape used to dress the wound.  You should call our office if this develops.      Swelling  and/or discoloration around your surgical site is common, particularly when performed around the eye.    All wounds normally drain.  The larger the wound the more drainage there will be.  After 7-10 days, you will notice the wound beginning to shrink and new skin will begin to grow.  The wound is healed when you can see skin has formed over the entire area.  A healed wound has a healthy, shiny look to the surface and is red to dark pink in color to normalize.  Wounds may  take approximately 4-6 weeks to heal.  Larger wounds may take 6-8 weeks.  After the wound is healed you may discontinue dressing changes.    You may experience a sensation of tightness as your wound heals. This is normal and will gradually subside.    Your healed wound may be sensitive to temperature changes. This sensitivity improves with time, but if you re having a lot of discomfort, try to avoid temperature extremes.    Patients frequently experience itching after their wound appears to have healed because of the continue healing under the skin.  Plain Vaseline will help relieve the itching.      POSSIBLE COMPLICATIONS    BLEEDING:    Leave the bandage in place.  Use tightly rolled up gauze or a cloth to apply direct pressure over the bandage for 30  minutes.  Reapply pressure for an additional 30 minutes if necessary  Use additional gauze and tape to maintain pressure once the bleeding has stopped.

## 2024-06-24 NOTE — LETTER
6/24/2024      Stephanie Trejo  [address]  [place] [address]      Dear Colleague,    Thank you for referring your patient, Stephanie Trejo, to the [facility]. Please see a copy of my visit note below.    HPI:   Chief complaints: Stephanie Trejo is a pleasant 63 year old female who presents for Full skin cancer screening to rule out skin cancer   Last Skin Exam: 1 year ago      1st Baseline: no  Personal HX of Skin Cancer: no   Personal HX of Malignant Melanoma: no   Family HX of Skin Cancer / Malignant Melanoma: no  Personal HX of Atypical Moles:   no  Risk factors: history of sun exposure and burns  New / Changing lesions:none  Social History: has 2 children and 3 grandchildren. Daughter and 1 granson live in [place]  On review of systems, there are no further skin complaints, patient is feeling otherwise well.   ROS of the following were done and are negative: Constitutional, Eyes, Ears, Nose,   Mouth, Throat, Cardiovascular, Respiratory, GI, Genitourinary, Musculoskeletal,   Psychiatric, Endocrine, Allergic/Immunologic.    PHYSICAL EXAM:   LMP 09/21/2012   Skin exam performed as follows: Type 2 skin. Mood appropriate  Alert and Oriented X 3. Well developed, well nourished in no distress.  General appearance: Normal  Head including face: Normal  Eyes: conjunctiva and lids: Normal  Mouth: Lips, teeth, gums: Normal  Neck: Normal  Chest-breast/axillae: Normal  Back: Normal  Extremities: digits/nails (clubbing): Normal  Eccrine and Apocrine glands: Normal  Right upper extremity: Normal  Left upper extremity: Normal  Right lower extremity: Normal  Left lower extremity: Normal  Skin: Scalp and body hair: See below    Pt deferred exam of breasts, groin, buttocks: No    Other physical findings:  1. Multiple pigmented macules on extremities and trunk  2. Multiple pigmented macules on face, trunk and extremities  3. Multiple vascular papules on trunk, arms and legs  4. Multiple scattered  keratotic plaques  5. 5 x 2 mm irregular brown macule on the left great toe  6. Inflamed keratotic papules on the back x 14  7. Pearly papule on the right upper eyelid        Except as noted above, no other signs of skin cancer or melanoma.     ASSESSMENT/PLAN:   Benign Full skin cancer screening today. . Patient with history of none  Advised on monthly self exams and 1 year  Patient Education: Appropriate brochures given.    Multiple benign appearing melanocytic nevi on arms, legs and trunk. Discussed ABCDEs of melanoma and sunscreen.   Multiple lentigos on arms, legs and trunk. Advised benign, no treatment needed.  Multiple scattered angiomas. Advised benign, no treatment needed.   Seborrheic keratosis on arms, legs and trunk. Advised benign, no treatment needed.  R/o atypical nevus on the left great toe. Shave biopsy performed.  Area cleaned and anesthetized with 1% lidocaine with epinephrine.  Dermablade used to remove the lesion and sent to pathology. Bleeding was cauterized. Pt tolerated procedure well with no complications.   Inflamed seborrheic keratosis on the back x 14. As physically tender cryosurgery performed. Advised on post op care.   Milia on the right upper eyelid - extraction attempted and unable to extract the milia (no charge for this).           Follow-up: yearly/PRN sooner    1.) Patient was asked about new and changing moles. YES  2.) Patient received a complete physical skin examination: YES  3.) Patient was counseled to perform a monthly self skin examination: YES  Scribed By: Payal Martinez, MS, PAREGAN      Again, thank you for allowing me to participate in the care of your patient.        Sincerely,        Payal Martinez PA-C

## 2024-08-23 ENCOUNTER — E-VISIT (OUTPATIENT)
Dept: FAMILY MEDICINE | Facility: CLINIC | Age: 64
End: 2024-08-23
Payer: COMMERCIAL

## 2024-08-23 DIAGNOSIS — R30.0 DYSURIA: Primary | ICD-10-CM

## 2024-08-23 PROCEDURE — 99421 OL DIG E/M SVC 5-10 MIN: CPT | Performed by: FAMILY MEDICINE

## 2024-08-30 ENCOUNTER — LAB (OUTPATIENT)
Dept: LAB | Facility: CLINIC | Age: 64
End: 2024-08-30
Payer: COMMERCIAL

## 2024-08-30 DIAGNOSIS — R30.0 DYSURIA: ICD-10-CM

## 2024-08-30 LAB
ALBUMIN UR-MCNC: NEGATIVE MG/DL
APPEARANCE UR: CLEAR
BILIRUB UR QL STRIP: NEGATIVE
CLUE CELLS: ABNORMAL
COLOR UR AUTO: YELLOW
GLUCOSE UR STRIP-MCNC: NEGATIVE MG/DL
HGB UR QL STRIP: ABNORMAL
KETONES UR STRIP-MCNC: NEGATIVE MG/DL
LEUKOCYTE ESTERASE UR QL STRIP: ABNORMAL
NITRATE UR QL: NEGATIVE
PH UR STRIP: 7 [PH] (ref 5–7)
RBC #/AREA URNS AUTO: ABNORMAL /HPF
SP GR UR STRIP: 1.01 (ref 1–1.03)
TRICHOMONAS, WET PREP: ABNORMAL
UROBILINOGEN UR STRIP-ACNC: 0.2 E.U./DL
WBC #/AREA URNS AUTO: ABNORMAL /HPF
WBC'S/HIGH POWER FIELD, WET PREP: ABNORMAL
YEAST, WET PREP: ABNORMAL

## 2024-08-30 PROCEDURE — 81001 URINALYSIS AUTO W/SCOPE: CPT

## 2024-08-30 PROCEDURE — 87210 SMEAR WET MOUNT SALINE/INK: CPT

## 2024-08-30 RX ORDER — SULFAMETHOXAZOLE/TRIMETHOPRIM 800-160 MG
1 TABLET ORAL 2 TIMES DAILY
Qty: 10 TABLET | Refills: 0 | Status: SHIPPED | OUTPATIENT
Start: 2024-08-30

## 2024-10-27 SDOH — HEALTH STABILITY: PHYSICAL HEALTH: ON AVERAGE, HOW MANY DAYS PER WEEK DO YOU ENGAGE IN MODERATE TO STRENUOUS EXERCISE (LIKE A BRISK WALK)?: 5 DAYS

## 2024-10-27 SDOH — HEALTH STABILITY: PHYSICAL HEALTH: ON AVERAGE, HOW MANY MINUTES DO YOU ENGAGE IN EXERCISE AT THIS LEVEL?: 150+ MIN

## 2024-10-27 ASSESSMENT — SOCIAL DETERMINANTS OF HEALTH (SDOH): HOW OFTEN DO YOU GET TOGETHER WITH FRIENDS OR RELATIVES?: ONCE A WEEK

## 2024-10-29 DIAGNOSIS — E78.5 HYPERLIPIDEMIA LDL GOAL <100: ICD-10-CM

## 2024-10-29 RX ORDER — ATORVASTATIN CALCIUM 20 MG/1
20 TABLET, FILM COATED ORAL DAILY
Qty: 90 TABLET | Refills: 3 | Status: SHIPPED | OUTPATIENT
Start: 2024-10-29

## 2024-11-01 ENCOUNTER — OFFICE VISIT (OUTPATIENT)
Dept: FAMILY MEDICINE | Facility: CLINIC | Age: 64
End: 2024-11-01
Payer: COMMERCIAL

## 2024-11-01 VITALS
RESPIRATION RATE: 14 BRPM | HEART RATE: 55 BPM | DIASTOLIC BLOOD PRESSURE: 80 MMHG | SYSTOLIC BLOOD PRESSURE: 132 MMHG | WEIGHT: 207 LBS | OXYGEN SATURATION: 97 % | HEIGHT: 70 IN | TEMPERATURE: 97.1 F | BODY MASS INDEX: 29.63 KG/M2

## 2024-11-01 DIAGNOSIS — I10 ESSENTIAL HYPERTENSION: ICD-10-CM

## 2024-11-01 DIAGNOSIS — F32.0 MILD MAJOR DEPRESSION (H): Primary | ICD-10-CM

## 2024-11-01 DIAGNOSIS — E78.5 HYPERLIPIDEMIA LDL GOAL <100: ICD-10-CM

## 2024-11-01 DIAGNOSIS — H02.403 PTOSIS OF BOTH EYELIDS: ICD-10-CM

## 2024-11-01 DIAGNOSIS — K21.9 GASTROESOPHAGEAL REFLUX DISEASE WITHOUT ESOPHAGITIS: ICD-10-CM

## 2024-11-01 DIAGNOSIS — F51.04 PSYCHOPHYSIOLOGICAL INSOMNIA: ICD-10-CM

## 2024-11-01 DIAGNOSIS — E03.9 HYPOTHYROIDISM, UNSPECIFIED TYPE: ICD-10-CM

## 2024-11-01 DIAGNOSIS — Z00.00 ENCOUNTER FOR MEDICARE ANNUAL WELLNESS EXAM: ICD-10-CM

## 2024-11-01 DIAGNOSIS — N18.31 CHRONIC KIDNEY DISEASE, STAGE 3A (H): ICD-10-CM

## 2024-11-01 DIAGNOSIS — F41.9 ANXIETY: ICD-10-CM

## 2024-11-01 LAB
ALBUMIN SERPL BCG-MCNC: 4.4 G/DL (ref 3.5–5.2)
ALP SERPL-CCNC: 129 U/L (ref 40–150)
ALT SERPL W P-5'-P-CCNC: 24 U/L (ref 0–50)
ANION GAP SERPL CALCULATED.3IONS-SCNC: 13 MMOL/L (ref 7–15)
AST SERPL W P-5'-P-CCNC: 31 U/L (ref 0–45)
BILIRUB SERPL-MCNC: 0.6 MG/DL
BUN SERPL-MCNC: 15.8 MG/DL (ref 8–23)
CALCIUM SERPL-MCNC: 10 MG/DL (ref 8.8–10.4)
CHLORIDE SERPL-SCNC: 103 MMOL/L (ref 98–107)
CHOLEST SERPL-MCNC: 163 MG/DL
CREAT SERPL-MCNC: 0.9 MG/DL (ref 0.51–0.95)
EGFRCR SERPLBLD CKD-EPI 2021: 70 ML/MIN/1.73M2
ERYTHROCYTE [DISTWIDTH] IN BLOOD BY AUTOMATED COUNT: 13.2 % (ref 10–15)
FASTING STATUS PATIENT QL REPORTED: YES
FASTING STATUS PATIENT QL REPORTED: YES
GLUCOSE SERPL-MCNC: 95 MG/DL (ref 70–99)
HCO3 SERPL-SCNC: 28 MMOL/L (ref 22–29)
HCT VFR BLD AUTO: 42.3 % (ref 35–47)
HDLC SERPL-MCNC: 68 MG/DL
HGB BLD-MCNC: 13.6 G/DL (ref 11.7–15.7)
LDLC SERPL CALC-MCNC: 79 MG/DL
MCH RBC QN AUTO: 30.4 PG (ref 26.5–33)
MCHC RBC AUTO-ENTMCNC: 32.2 G/DL (ref 31.5–36.5)
MCV RBC AUTO: 94 FL (ref 78–100)
NONHDLC SERPL-MCNC: 95 MG/DL
PLATELET # BLD AUTO: 191 10E3/UL (ref 150–450)
POTASSIUM SERPL-SCNC: 4.1 MMOL/L (ref 3.4–5.3)
PROT SERPL-MCNC: 6.9 G/DL (ref 6.4–8.3)
RBC # BLD AUTO: 4.48 10E6/UL (ref 3.8–5.2)
SODIUM SERPL-SCNC: 144 MMOL/L (ref 135–145)
TRIGL SERPL-MCNC: 82 MG/DL
TSH SERPL DL<=0.005 MIU/L-ACNC: 1.98 UIU/ML (ref 0.3–4.2)
WBC # BLD AUTO: 5.7 10E3/UL (ref 4–11)

## 2024-11-01 PROCEDURE — G0008 ADMIN INFLUENZA VIRUS VAC: HCPCS | Performed by: FAMILY MEDICINE

## 2024-11-01 PROCEDURE — 85027 COMPLETE CBC AUTOMATED: CPT | Performed by: FAMILY MEDICINE

## 2024-11-01 PROCEDURE — 84443 ASSAY THYROID STIM HORMONE: CPT | Performed by: FAMILY MEDICINE

## 2024-11-01 PROCEDURE — G0439 PPPS, SUBSEQ VISIT: HCPCS | Performed by: FAMILY MEDICINE

## 2024-11-01 PROCEDURE — 90662 IIV NO PRSV INCREASED AG IM: CPT | Performed by: FAMILY MEDICINE

## 2024-11-01 PROCEDURE — 90480 ADMN SARSCOV2 VAC 1/ONLY CMP: CPT | Performed by: FAMILY MEDICINE

## 2024-11-01 PROCEDURE — 80061 LIPID PANEL: CPT | Performed by: FAMILY MEDICINE

## 2024-11-01 PROCEDURE — 99214 OFFICE O/P EST MOD 30 MIN: CPT | Mod: 25 | Performed by: FAMILY MEDICINE

## 2024-11-01 PROCEDURE — 36415 COLL VENOUS BLD VENIPUNCTURE: CPT | Performed by: FAMILY MEDICINE

## 2024-11-01 PROCEDURE — 80053 COMPREHEN METABOLIC PANEL: CPT | Performed by: FAMILY MEDICINE

## 2024-11-01 PROCEDURE — 91320 SARSCV2 VAC 30MCG TRS-SUC IM: CPT | Performed by: FAMILY MEDICINE

## 2024-11-01 RX ORDER — HYDROCHLOROTHIAZIDE 25 MG/1
25 TABLET ORAL DAILY
Qty: 90 TABLET | Refills: 3 | Status: SHIPPED | OUTPATIENT
Start: 2024-11-01

## 2024-11-01 RX ORDER — LEVOTHYROXINE SODIUM 125 UG/1
TABLET ORAL
Qty: 90 TABLET | Refills: 3 | Status: SHIPPED | OUTPATIENT
Start: 2024-11-01

## 2024-11-01 RX ORDER — MULTIVITAMIN WITH IRON
1 TABLET ORAL DAILY
COMMUNITY

## 2024-11-01 RX ORDER — TEMAZEPAM 15 MG/1
15 CAPSULE ORAL
Qty: 90 CAPSULE | Refills: 1 | Status: SHIPPED | OUTPATIENT
Start: 2024-11-01

## 2024-11-01 RX ORDER — PANTOPRAZOLE SODIUM 40 MG/1
TABLET, DELAYED RELEASE ORAL
Qty: 90 TABLET | Refills: 3 | Status: SHIPPED | OUTPATIENT
Start: 2024-11-01

## 2024-11-01 RX ORDER — DULOXETIN HYDROCHLORIDE 60 MG/1
60 CAPSULE, DELAYED RELEASE ORAL DAILY
Qty: 90 CAPSULE | Refills: 3 | Status: SHIPPED | OUTPATIENT
Start: 2024-11-01

## 2024-11-01 RX ORDER — LACTOBACILLUS RHAMNOSUS GG 10B CELL
1 CAPSULE ORAL 2 TIMES DAILY
COMMUNITY

## 2024-11-01 RX ORDER — LOSARTAN POTASSIUM 50 MG/1
50 TABLET ORAL DAILY
Qty: 90 TABLET | Refills: 3 | Status: SHIPPED | OUTPATIENT
Start: 2024-11-01

## 2024-11-01 ASSESSMENT — PATIENT HEALTH QUESTIONNAIRE - PHQ9
SUM OF ALL RESPONSES TO PHQ QUESTIONS 1-9: 3
10. IF YOU CHECKED OFF ANY PROBLEMS, HOW DIFFICULT HAVE THESE PROBLEMS MADE IT FOR YOU TO DO YOUR WORK, TAKE CARE OF THINGS AT HOME, OR GET ALONG WITH OTHER PEOPLE: NOT DIFFICULT AT ALL
SUM OF ALL RESPONSES TO PHQ QUESTIONS 1-9: 3

## 2024-11-01 ASSESSMENT — PAIN SCALES - GENERAL: PAINLEVEL_OUTOF10: NO PAIN (0)

## 2024-11-01 NOTE — PATIENT INSTRUCTIONS
Patient Education   Preventive Care Advice   This is general advice given by our system to help you stay healthy. However, your care team may have specific advice just for you. Please talk to your care team about your preventive care needs.  Nutrition  Eat 5 or more servings of fruits and vegetables each day.  Try wheat bread, brown rice and whole grain pasta (instead of white bread, rice, and pasta).  Get enough calcium and vitamin D. Check the label on foods and aim for 100% of the RDA (recommended daily allowance).  Lifestyle  Exercise at least 150 minutes each week  (30 minutes a day, 5 days a week).  Do muscle strengthening activities 2 days a week. These help control your weight and prevent disease.  No smoking.  Wear sunscreen to prevent skin cancer.  Have a dental exam and cleaning every 6 months.  Yearly exams  See your health care team every year to talk about:  Any changes in your health.  Any medicines your care team has prescribed.  Preventive care, family planning, and ways to prevent chronic diseases.  Shots (vaccines)   HPV shots (up to age 26), if you've never had them before.  Hepatitis B shots (up to age 59), if you've never had them before.  COVID-19 shot: Get this shot when it's due.  Flu shot: Get a flu shot every year.  Tetanus shot: Get a tetanus shot every 10 years.  Pneumococcal, hepatitis A, and RSV shots: Ask your care team if you need these based on your risk.  Shingles shot (for age 50 and up)  General health tests  Diabetes screening:  Starting at age 35, Get screened for diabetes at least every 3 years.  If you are younger than age 35, ask your care team if you should be screened for diabetes.  Cholesterol test: At age 39, start having a cholesterol test every 5 years, or more often if advised.  Bone density scan (DEXA): At age 50, ask your care team if you should have this scan for osteoporosis (brittle bones).  Hepatitis C: Get tested at least once in your life.  STIs (sexually  transmitted infections)  Before age 24: Ask your care team if you should be screened for STIs.  After age 24: Get screened for STIs if you're at risk. You are at risk for STIs (including HIV) if:  You are sexually active with more than one person.  You don't use condoms every time.  You or a partner was diagnosed with a sexually transmitted infection.  If you are at risk for HIV, ask about PrEP medicine to prevent HIV.  Get tested for HIV at least once in your life, whether you are at risk for HIV or not.  Cancer screening tests  Cervical cancer screening: If you have a cervix, begin getting regular cervical cancer screening tests starting at age 21.  Breast cancer scan (mammogram): If you've ever had breasts, begin having regular mammograms starting at age 40. This is a scan to check for breast cancer.  Colon cancer screening: It is important to start screening for colon cancer at age 45.  Have a colonoscopy test every 10 years (or more often if you're at risk) Or, ask your provider about stool tests like a FIT test every year or Cologuard test every 3 years.  To learn more about your testing options, visit:   .  For help making a decision, visit:   https://bit.ly/aq66616.  Prostate cancer screening test: If you have a prostate, ask your care team if a prostate cancer screening test (PSA) at age 55 is right for you.  Lung cancer screening: If you are a current or former smoker ages 50 to 80, ask your care team if ongoing lung cancer screenings are right for you.  For informational purposes only. Not to replace the advice of your health care provider. Copyright   2023 [facility] [employer]. All rights reserved. Clinically reviewed by the [facility]. frestyl [id number] - REV 01/24.  Hearing Loss: Care Instructions  Overview     Hearing loss is a sudden or slow decrease in how well you hear. It can range from slight to profound. Permanent hearing loss can occur with aging. It also can  happen when you are exposed long-term to loud noise. Examples include listening to loud music, riding motorcycles, or being around other loud machines.  Hearing loss can affect your work and home life. It can make you feel lonely or depressed. You may feel that you have lost your independence. But hearing aids and other devices can help you hear better and feel connected to others.  Follow-up care is a key part of your treatment and safety. Be sure to make and go to all appointments, and call your doctor if you are having problems. It's also a good idea to know your test results and keep a list of the medicines you take.  How can you care for yourself at home?  Avoid loud noises whenever possible. This helps keep your hearing from getting worse.  Always wear hearing protection around loud noises.  Wear a hearing aid as directed.  A professional can help you pick a hearing aid that will work best for you.  You can also get hearing aids over the counter for mild to moderate hearing loss.  Have hearing tests as your doctor suggests. They can show whether your hearing has changed. Your hearing aid may need to be adjusted.  Use other devices as needed. These may include:  Telephone amplifiers and hearing aids that can connect to a television, stereo, radio, or microphone.  Devices that use lights or vibrations. These alert you to the doorbell, a ringing telephone, or a baby monitor.  Television closed-captioning. This shows the words at the bottom of the screen. Most new TVs can do this.  TTY (text telephone). This lets you type messages back and forth on the telephone instead of talking or listening. These devices are also called TDD. When messages are typed on the keyboard, they are sent over the phone line to a receiving TTY. The message is shown on a monitor.  Use text messaging, social media, and email if it is hard for you to communicate by telephone.  Try to learn a listening technique called speechreading. It is  "not lipreading. You pay attention to people's gestures, expressions, posture, and tone of voice. These clues can help you understand what a person is saying. Face the person you are talking to, and have them face you. Make sure the lighting is good. You need to see the other person's face clearly.  Think about counseling if you need help to adjust to your hearing loss.  When should you call for help?  Watch closely for changes in your health, and be sure to contact your doctor if:    You think your hearing is getting worse.     You have new symptoms, such as dizziness or nausea.   Where can you learn more?  Go to https://www.[employer].net/patiented  Enter R798 in the search box to learn more about \"Hearing Loss: Care Instructions.\"  Current as of: September 27, 2023  Content Version: 14.2    2024 [employer].   Care instructions adapted under license by your healthcare professional. If you have questions about a medical condition or this instruction, always ask your healthcare professional. [employer] disclaims any warranty or liability for your use of this information.    Learning About Sleeping Well  What does sleeping well mean?     Sleeping well means getting enough sleep to feel good and stay healthy. How much sleep is enough varies among people.  The number of hours you sleep and how you feel when you wake up are both important. If you do not feel refreshed, you probably need more sleep. Another sign of not getting enough sleep is feeling tired during the day.  Experts recommend that adults get at least 7 or more hours of sleep per day. Children and older adults need more sleep.  Why is getting enough sleep important?  Getting enough quality sleep is a basic part of good health. When your sleep suffers, your physical health, mood, and your thoughts can suffer too. You may find yourself feeling more grumpy or stressed. Not getting enough sleep also can lead to serious problems, " "including injury, accidents, anxiety, and depression.  What might cause poor sleeping?  Many things can cause sleep problems, including:  Changes to your sleep schedule.  Stress. Stress can be caused by fear about a single event, such as giving a speech. Or you may have ongoing stress, such as worry about work or school.  Depression, anxiety, and other mental or emotional conditions.  Changes in your sleep habits or surroundings. This includes changes that happen where you sleep, such as noise, light, or sleeping in a different bed. It also includes changes in your sleep pattern, such as having jet lag or working a late shift.  Health problems, such as pain, breathing problems, and restless legs syndrome.  Lack of regular exercise.  Using alcohol, nicotine, or caffeine before bed.  How can you help yourself?  Here are some tips that may help you sleep more soundly and wake up feeling more refreshed.  Your sleeping area   Use your bedroom only for sleeping and sex. A bit of light reading may help you fall asleep. But if it doesn't, do your reading elsewhere in the house. Try not to use your TV, computer, smartphone, or tablet while you are in bed.  Be sure your bed is big enough to stretch out comfortably, especially if you have a sleep partner.  Keep your bedroom quiet, dark, and cool. Use curtains, blinds, or a sleep mask to block out light. To block out noise, use earplugs, soothing music, or a \"white noise\" machine.  Your evening and bedtime routine   Create a relaxing bedtime routine. You might want to take a warm shower or bath, or listen to soothing music.  Go to bed at the same time every night. And get up at the same time every morning, even if you feel tired.  What to avoid   Limit caffeine (coffee, tea, caffeinated sodas) during the day, and don't have any for at least 6 hours before bedtime.  Avoid drinking alcohol before bedtime. Alcohol can cause you to wake up more often during the night.  Try not to " "smoke or use tobacco, especially in the evening. Nicotine can keep you awake.  Limit naps during the day, especially close to bedtime.  Avoid lying in bed awake for too long. If you can't fall asleep or if you wake up in the middle of the night and can't get back to sleep within about 20 minutes, get out of bed and go to another room until you feel sleepy.  Avoid taking medicine right before bed that may keep you awake or make you feel hyper or energized. Your doctor can tell you if your medicine may do this and if you can take it earlier in the day.  If you can't sleep   Imagine yourself in a peaceful, pleasant scene. Focus on the details and feelings of being in a place that is relaxing.  Get up and do a quiet or boring activity until you feel sleepy.  Avoid drinking any liquids before going to bed to help prevent waking up often to use the bathroom.  Where can you learn more?  Go to https://www.[employer].net/patiented  Enter J942 in the search box to learn more about \"Learning About Sleeping Well.\"  Current as of: July 10, 2023  Content Version: 14.2 2024 [employer].   Care instructions adapted under license by your healthcare professional. If you have questions about a medical condition or this instruction, always ask your healthcare professional. [employer] disclaims any warranty or liability for your use of this information.       "

## 2024-11-01 NOTE — NURSING NOTE
"Chief Complaint   Patient presents with    Wellness Visit     /80   Pulse 55   Temp 97.1  F (36.2  C) (Tympanic)   Resp 14   Ht 1.772 m (5' 9.75\")   Wt 93.9 kg (207 lb)   LMP 09/21/2012   SpO2 97%   BMI 29.91 kg/m   Estimated body mass index is 29.91 kg/m  as calculated from the following:    Height as of this encounter: 1.772 m (5' 9.75\").    Weight as of this encounter: 93.9 kg (207 lb).  Patient presents to the clinic using No DME      Health Maintenance that is potentially due pending provider review:    Health Maintenance Due   Topic Date Due    RSV VACCINE (1 - Risk 60-74 years 1-dose series) Never done    BMP  05/16/2024    INFLUENZA VACCINE (1) 09/01/2024    COVID-19 Vaccine (6 - 2024-25 season) 09/01/2024    MEDICARE ANNUAL WELLNESS VISIT  11/16/2024    TSH W/FREE T4 REFLEX  11/16/2024    ANNUAL REVIEW OF HM ORDERS  11/16/2024    HEMOGLOBIN  11/16/2024                  "

## 2024-11-01 NOTE — PROGRESS NOTES
Preventive Care Visit  [facility]  Radha Felton MD, Family Medicine  Nov 1, 2024      Assessment & Plan     Mild major depression (H)  Not well controlled   Increase dose of cymbalta to 60 mg daily   - DULoxetine (CYMBALTA) 60 MG capsule; Take 1 capsule (60 mg) by mouth daily.    Chronic kidney disease, stage 3a (H)  Stable no change in treatment plan.     Essential hypertension  Stable no change in treatment plan.   - hydrochlorothiazide (HYDRODIURIL) 25 MG tablet; Take 1 tablet (25 mg) by mouth daily.  - losartan (COZAAR) 50 MG tablet; Take 1 tablet (50 mg) by mouth daily.  - CBC with platelets; Future  - Comprehensive metabolic panel; Future  - Lipid panel reflex to direct LDL Fasting; Future  - CBC with platelets  - Comprehensive metabolic panel  - Lipid panel reflex to direct LDL Fasting    Hypothyroidism, unspecified type  Adjust meds as indicated by above labs.   - TSH WITH FREE T4 REFLEX; Future  - levothyroxine (SYNTHROID/LEVOTHROID) 125 MCG tablet; TAKE 1 TABLET BY MOUTH ONCE DAILY .  - TSH WITH FREE T4 REFLEX    Gastroesophageal reflux disease without esophagitis  Stable no change in treatment plan.   - pantoprazole (PROTONIX) 40 MG EC tablet; TAKE 1 TABLET BY MOUTH ONCE DAILY TAKE  60  MINUTES  BEFORE  ANY  FOOD  OR  DRINK  INTAKE    Psychophysiological insomnia  Stable no change in treatment plan.   - temazepam (RESTORIL) 15 MG capsule; Take 1 capsule (15 mg) by mouth nightly as needed for sleep.    Anxiety  Stable no change in treatment plan.   - temazepam (RESTORIL) 15 MG capsule; Take 1 capsule (15 mg) by mouth nightly as needed for sleep.    Encounter for Medicare annual wellness exam      Ptosis of both eyelids  Having some vision changes related to this   - Adult Eye  Referral; Future    Patient has been advised of split billing requirements and indicates understanding: Yes        BMI  Estimated body mass index is 29.91 kg/m  as calculated from the  "following:    Height as of this encounter: 1.772 m (5' 9.75\").    Weight as of this encounter: 93.9 kg (207 lb).       Counseling  Appropriate preventive services were addressed with this patient via screening, questionnaire, or discussion as appropriate for fall prevention, nutrition, physical activity, Tobacco-use cessation, social engagement, weight loss and cognition.  Checklist reviewing preventive services available has been given to the patient.          Estrada Brown is a 64 year old, presenting for the following:  Wellness Visit        11/1/2024    10:12 AM   Additional Questions   Roomed by Kassy MUKHERJEE   Accompanied by Self         11/1/2024    10:12 AM   Patient Reported Additional Medications   Patient reports taking the following new medications .           HPI      Hyperlipidemia Follow-Up    Are you regularly taking any medication or supplement to lower your cholesterol?   Yes- lipitor  Are you having muscle aches or other side effects that you think could be caused by your cholesterol lowering medication?  No    Hypertension Follow-up    Do you check your blood pressure regularly outside of the clinic? No   Are you following a low salt diet? No no added salt  Are your blood pressures ever more than 140 on the top number (systolic) OR more   than 90 on the bottom number (diastolic), for example 140/90?     Hypothyroidism Follow-up    Since last visit, patient describes the following symptoms: Weight stable, no hair loss, no skin changes, no constipation, no loose stools    Depression and Anxiety   How are you doing with your depression since your last visit? Worsened no energy or motivation to do anything   Feeling low   How are you doing with your anxiety since your last visit?  No change  Are you having other symptoms that might be associated with depression or anxiety? No  Have you had a significant life event? No   Do you have any concerns with your use of alcohol or other drugs? No    Social " History     Tobacco Use    Smoking status: Never    Smokeless tobacco: Never   Vaping Use    Vaping status: Never Used   Substance Use Topics    Alcohol use: No    Drug use: No         8/9/2023     5:58 PM 11/15/2023     9:47 AM 11/1/2024    10:05 AM   PHQ   PHQ-9 Total Score 1 5 3    Q9: Thoughts of better off dead/self-harm past 2 weeks Not at all  Not at all  Not at all        Patient-reported         12/4/2018     2:39 PM 1/11/2020     7:51 AM 3/5/2021     1:33 PM   SHERON-7 SCORE   Total Score 1 (minimal anxiety) 0 (minimal anxiety)    Total Score 1 0 0         Suicide Assessment Five-step Evaluation and Treatment (SAFE-T)    Chronic Kidney Disease Follow-up    Do you take any over the counter pain medicine?: No      GERD stable on meds    Ptosis of bilateral eyelids  Feels like this is starting to affect vision   She has noted sig change this past year     Health Care Directive  Patient has a Health Care Directive on file  Did not discuss       10/27/2024   General Health   How would you rate your overall physical health? Good   Feel stress (tense, anxious, or unable to sleep) Not at all            10/27/2024   Nutrition   Diet: Regular (no restrictions)            10/27/2024   Exercise   Days per week of moderate/strenous exercise 5 days   Average minutes spent exercising at this level 150+ min            10/27/2024   Social Factors   Frequency of gathering with friends or relatives Once a week   Worry food won't last until get money to buy more No   Food not last or not have enough money for food? No   Do you have housing? (Housing is defined as stable permanent housing and does not include staying ouside in a car, in a tent, in an abandoned building, in an overnight shelter, or couch-surfing.) Yes   Are you worried about losing your housing? No   Lack of transportation? No   Unable to get utilities (heat,electricity)? No            10/27/2024   Fall Risk   Fallen 2 or more times in the past year? No    Trouble  with walking or balance? No        Patient-reported          10/27/2024   Activities of Daily Living- Home Safety   Needs help with the following daily activites None of the above   Safety concerns in the home None of the above            10/27/2024   Dental   Dentist two times every year? Yes            10/27/2024   Hearing Screening   Hearing concerns? (!) I NEED TO ASK PEOPLE TO SPEAK UP OR REPEAT THEMSELVES.    (!) IT'S HARD TO FOLLOW A CONVERSATION IN A NOISY RESTAURANT OR CROWDED ROOM.    (!) TROUBLE UNDERSTANDING SOFT OR WHISPERED SPEECH.       Multiple values from one day are sorted in reverse-chronological order         10/27/2024   Driving Risk Screening   Patient/family members have concerns about driving No            10/27/2024   General Alertness/Fatigue Screening   Have you been more tired than usual lately? (!) YES            10/27/2024   Urinary Incontinence Screening   Bothered by leaking urine in past 6 months No            10/27/2024   TB Screening   Were you born outside of the [place]? No          Today's PHQ-9 Score:       11/1/2024    10:05 AM   PHQ-9 SCORE   PHQ-9 Total Score MyChart 3 (Minimal depression)   PHQ-9 Total Score 3        Patient-reported         10/27/2024   Substance Use   Alcohol more than 3/day or more than 7/wk No   Do you have a current opioid prescription? No   How severe/bad is pain from 1 to 10? 1/10   Do you use any other substances recreationally? No        Social History     Tobacco Use    Smoking status: Never    Smokeless tobacco: Never   Vaping Use    Vaping status: Never Used   Substance Use Topics    Alcohol use: No    Drug use: No           11/15/2023   LAST FHS-7 RESULTS   1st degree relative breast or ovarian cancer No    Any relative bilateral breast cancer No    Any male have breast cancer No    Any ONE woman have BOTH breast AND ovarian cancer Yes    Any woman with breast cancer before 50yrs No    2 or more relatives with breast AND/OR ovarian cancer No    2 or  more relatives with breast AND/OR bowel cancer Yes        Patient-reported        Mammogram Screening - Mammogram every 1-2 years updated in Health Maintenance based on mutual decision making      History of abnormal Pap smear: No - age 30- 64 PAP with HPV every 5 years recommended        Latest Ref Rng & Units 3/31/2021     9:31 AM 3/31/2021     9:17 AM 11/29/2016     4:10 PM   PAP / HPV   PAP (Historical)   NIL     HPV 16 DNA NEG^Negative Negative   Negative    HPV 18 DNA NEG^Negative Negative   Negative    Other HR HPV NEG^Negative Negative   Negative      ASCVD Risk   The 10-year ASCVD risk score (Artur WEN, et al., 2019) is: 7.4%    Values used to calculate the score:      Age: 64 years      Sex: Female      Is Non- : No      Diabetic: No      Tobacco smoker: No      Systolic Blood Pressure: 132 mmHg      Is BP treated: Yes      HDL Cholesterol: 68 mg/dL      Total Cholesterol: 163 mg/dL            Reviewed and updated as needed this visit by Provider   Tobacco  Allergies  Meds  Problems  Med Hx  Surg Hx  Fam Hx              Current providers sharing in care for this patient include:  Patient Care Team:  Radha Felton MD as PCP - General  RehRadha fagan MD as Assigned PCP  Ingrid Price AuD as Audiologist (Audiology)  Jeb Quezada AuD as Audiologist (Audiology)  Payal Martinez PA-C as Assigned Surgical Provider    The following health maintenance items are reviewed in Epic and correct as of today:  Health Maintenance   Topic Date Due    RSV VACCINE (1 - Risk 60-74 years 1-dose series) Never done    MICROALBUMIN  02/06/2025    COLORECTAL CANCER SCREENING  02/18/2025    BMP  05/01/2025    PHQ-9  05/01/2025    MAMMO SCREENING  05/30/2025    MEDICARE ANNUAL WELLNESS VISIT  11/01/2025    LIPID  11/01/2025    TSH W/FREE T4 REFLEX  11/01/2025    ANNUAL REVIEW OF HM ORDERS  11/01/2025    FALL RISK ASSESSMENT  11/01/2025    HEMOGLOBIN  11/01/2025     "DTAP/TDAP/TD IMMUNIZATION (3 - Td or Tdap) 11/29/2026    GLUCOSE  11/01/2027    ADVANCE CARE PLANNING  11/16/2028    DEXA  04/15/2036    HEPATITIS C SCREENING  Completed    DEPRESSION ACTION PLAN  Completed    INFLUENZA VACCINE  Completed    Pneumococcal Vaccine: 65+ Years  Completed    URINALYSIS  Completed    ZOSTER IMMUNIZATION  Completed    COVID-19 Vaccine  Completed    HPV IMMUNIZATION  Aged Out    MENINGITIS IMMUNIZATION  Aged Out    RSV MONOCLONAL ANTIBODY  Aged Out    PAP  Discontinued         Review of Systems  Constitutional, HEENT, cardiovascular, pulmonary, gi and gu systems are negative, except as otherwise noted.     Objective    Exam  /80   Pulse 55   Temp 97.1  F (36.2  C) (Tympanic)   Resp 14   Ht 1.772 m (5' 9.75\")   Wt 93.9 kg (207 lb)   LMP 09/21/2012   SpO2 97%   BMI 29.91 kg/m     Estimated body mass index is 29.91 kg/m  as calculated from the following:    Height as of this encounter: 1.772 m (5' 9.75\").    Weight as of this encounter: 93.9 kg (207 lb).    Physical Exam  GENERAL: alert and no distress  EYES: Eyes grossly normal to inspection, PERRL, EOMI, conjunctivae and sclerae normal, and bilateral ptosis   HENT: ear canals and TM's normal, nose and mouth without ulcers or lesions  NECK: no adenopathy, no asymmetry, masses, or scars  RESP: lungs clear to auscultation - no rales, rhonchi or wheezes  CV: regular rate and rhythm, normal S1 S2, no S3 or S4, no murmur, click or rub, no peripheral edema  ABDOMEN: soft, nontender, no hepatosplenomegaly, no masses and bowel sounds normal  MS: no gross musculoskeletal defects noted, no edema  SKIN: no suspicious lesions or rashes  NEURO: Normal strength and tone, mentation intact and speech normal  PSYCH: mentation appears normal, affect normal/bright        11/1/2024   Mini Cog   Clock Draw Score 2 Normal   3 Item Recall 3 objects recalled   Mini Cog Total Score 5                 Signed Electronically by: Radha Felton, " MD    Answers submitted by the patient for this visit:  Patient Health Questionnaire (Submitted on 11/1/2024)  If you checked off any problems, how difficult have these problems made it for you to do your work, take care of things at home, or get along with other people?: Not difficult at all  PHQ9 TOTAL SCORE: 3

## 2024-12-10 ENCOUNTER — MYC MEDICAL ADVICE (OUTPATIENT)
Dept: FAMILY MEDICINE | Facility: CLINIC | Age: 64
End: 2024-12-10
Payer: COMMERCIAL

## 2024-12-10 DIAGNOSIS — F32.0 MILD MAJOR DEPRESSION (H): Primary | ICD-10-CM

## 2024-12-10 RX ORDER — DULOXETIN HYDROCHLORIDE 30 MG/1
30 CAPSULE, DELAYED RELEASE ORAL DAILY
Qty: 90 CAPSULE | Refills: 3 | Status: SHIPPED | OUTPATIENT
Start: 2024-12-10

## 2024-12-10 RX ORDER — VENLAFAXINE HYDROCHLORIDE 37.5 MG/1
37.5 CAPSULE, EXTENDED RELEASE ORAL DAILY
Qty: 90 CAPSULE | Refills: 1 | Status: SHIPPED | OUTPATIENT
Start: 2024-12-10

## 2024-12-10 ASSESSMENT — ANXIETY QUESTIONNAIRES
GAD7 TOTAL SCORE: 1
4. TROUBLE RELAXING: NOT AT ALL
IF YOU CHECKED OFF ANY PROBLEMS ON THIS QUESTIONNAIRE, HOW DIFFICULT HAVE THESE PROBLEMS MADE IT FOR YOU TO DO YOUR WORK, TAKE CARE OF THINGS AT HOME, OR GET ALONG WITH OTHER PEOPLE: NOT DIFFICULT AT ALL
1. FEELING NERVOUS, ANXIOUS, OR ON EDGE: NOT AT ALL
7. FEELING AFRAID AS IF SOMETHING AWFUL MIGHT HAPPEN: NOT AT ALL
6. BECOMING EASILY ANNOYED OR IRRITABLE: SEVERAL DAYS
GAD7 TOTAL SCORE: 1
3. WORRYING TOO MUCH ABOUT DIFFERENT THINGS: NOT AT ALL
2. NOT BEING ABLE TO STOP OR CONTROL WORRYING: NOT AT ALL
8. IF YOU CHECKED OFF ANY PROBLEMS, HOW DIFFICULT HAVE THESE MADE IT FOR YOU TO DO YOUR WORK, TAKE CARE OF THINGS AT HOME, OR GET ALONG WITH OTHER PEOPLE?: NOT DIFFICULT AT ALL
5. BEING SO RESTLESS THAT IT IS HARD TO SIT STILL: NOT AT ALL
7. FEELING AFRAID AS IF SOMETHING AWFUL MIGHT HAPPEN: NOT AT ALL
GAD7 TOTAL SCORE: 1

## 2024-12-10 ASSESSMENT — PATIENT HEALTH QUESTIONNAIRE - PHQ9
SUM OF ALL RESPONSES TO PHQ QUESTIONS 1-9: 6
10. IF YOU CHECKED OFF ANY PROBLEMS, HOW DIFFICULT HAVE THESE PROBLEMS MADE IT FOR YOU TO DO YOUR WORK, TAKE CARE OF THINGS AT HOME, OR GET ALONG WITH OTHER PEOPLE: SOMEWHAT DIFFICULT
SUM OF ALL RESPONSES TO PHQ QUESTIONS 1-9: 6

## 2024-12-11 NOTE — TELEPHONE ENCOUNTER
Pt was called and clarified medication change and the doses.  Closing encounter.    Miranda MABRY RN  [facility]

## 2025-01-19 ENCOUNTER — PATIENT OUTREACH (OUTPATIENT)
Dept: CARE COORDINATION | Facility: CLINIC | Age: 65
End: 2025-01-19
Payer: COMMERCIAL

## 2025-01-20 ENCOUNTER — PATIENT OUTREACH (OUTPATIENT)
Dept: GASTROENTEROLOGY | Facility: CLINIC | Age: 65
End: 2025-01-20
Payer: COMMERCIAL

## 2025-01-20 DIAGNOSIS — Z12.11 SPECIAL SCREENING FOR MALIGNANT NEOPLASMS, COLON: Primary | ICD-10-CM

## 2025-01-20 NOTE — PROGRESS NOTES
"Family hx CRC with 5yr recall recommended on last colonoscopy performed in 2020-self triage order    CRC Screening Colonoscopy Referral Review    Patient meets the inclusion criteria for screening colonoscopy standing order.    Ordering/Referring Provider:  Radha Felton      BMI: Estimated body mass index is 29.91 kg/m  as calculated from the following:    Height as of 11/1/24: 1.772 m (5' 9.75\").    Weight as of 11/1/24: 93.9 kg (207 lb).     Sedation:  Does patient have any of the following conditions affecting sedation?  Hx of Poor Sedation: MAC sedation recommended    Previous Scopes:  Any previous recommendations or follow up needs based on previous scope?  na / No recommendations.    Medical Concerns to Postpone Order:  Does patient have any of the following medical concerns that should postpone/delay colonoscopy referral?  No medical conditions affecting colonoscopy referral.    Final Referral Details:  Based on patient's medical history patient is appropriate for referral order with MAC/deep sedation.   BMI<= 45 45 < BMI <= 48 48 < BMI < = 50  BMI > 50   No Restrictions No [facility]  No [facility]  [facility] with exceptions Hospital Only OR Only         "

## 2025-01-31 ENCOUNTER — TRANSFERRED RECORDS (OUTPATIENT)
Dept: HEALTH INFORMATION MANAGEMENT | Facility: CLINIC | Age: 65
End: 2025-01-31
Payer: COMMERCIAL

## 2025-02-26 ENCOUNTER — ANESTHESIA EVENT (OUTPATIENT)
Dept: GASTROENTEROLOGY | Facility: CLINIC | Age: 65
End: 2025-02-26
Payer: COMMERCIAL

## 2025-02-26 NOTE — ANESTHESIA PREPROCEDURE EVALUATION
Anesthesia Pre-Procedure Evaluation    Patient: Stephanie Trejo   MRN: [id number] : 1960        Procedure : Procedure(s):  Colonoscopy          Past Medical History:   Diagnosis Date    CHF (congestive heart failure) (H) 2013    Depressive disorder     Hypertension     Mitral valve insufficiency, acquired     Other and unspecified disc disorder of lumbar region     Scoliosis (and kyphoscoliosis), idiopathic     Undiagnosed cardiac murmurs     Unspecified hypothyroidism       Past Surgical History:   Procedure Laterality Date    ABDOMEN SURGERY          BACK SURGERY      BIOPSY      COLONOSCOPY      COLONOSCOPY N/A 2020    Procedure: COLONOSCOPY;  Surgeon: Shaun Torrez MD;  Location: [facility]    ESOPHAGOSCOPY, GASTROSCOPY, DUODENOSCOPY (EGD), COMBINED N/A 2020    Procedure: ESOPHAGOGASTRODUODENOSCOPY, WITH BIOPSY;  Surgeon: Elsie Kincaid MD;  Location: [facility]    ESOPHAGOSCOPY, GASTROSCOPY, DUODENOSCOPY (EGD), COMBINED N/A 2023    Procedure: ESOPHAGOGASTRODUODENOSCOPY, WITH BIOPSY;  Surgeon: Jeb Way DO;  Location: [facility]    EYE SURGERY  23    Cataract right eye    GYN SURGERY      HERNIA REPAIR      SOFT TISSUE SURGERY      SURGICAL HISTORY OF -   1985        SURGICAL HISTORY OF -       Laparoscopy    SURGICAL HISTORY OF -   1983    Hernia Repair     SURGICAL HISTORY OF -   1998    Back Fusion T3 - L3      Allergies   Allergen Reactions    Amlodipine Besylate Swelling     Swelling in ankles, Racing pulse also    Ammonia Hives and Swelling    Lisinopril      Dizziness and headaches    Penicillins Rash      Social History     Tobacco Use    Smoking status: Never    Smokeless tobacco: Never   Substance Use Topics    Alcohol use: No      Wt Readings from Last 1 Encounters:   24 93.9 kg (207 lb)        Anesthesia Evaluation   Pt has had prior anesthetic.         ROS/MED HX  ENT/Pulmonary:     (+) sleep apnea,                                        Neurologic:       Cardiovascular:     (+)  hypertension- -   -  - -      CHF                     valvular problems/murmurs type: MVP          METS/Exercise Tolerance:     Hematologic:       Musculoskeletal:       GI/Hepatic:       Renal/Genitourinary:     (+) renal disease, type: CRI,            Endo:     (+)          thyroid problem, hypothyroidism,           Psychiatric/Substance Use:     (+) psychiatric history anxiety and depression       Infectious Disease:       Malignancy:       Other:            Physical Exam    Airway        Mallampati: I   TM distance: > 3 FB   Neck ROM: full   Mouth opening: > 3 cm    Respiratory Devices and Support         Dental       (+) Minor Abnormalities - some fillings, tiny chips      Cardiovascular   cardiovascular exam normal          Pulmonary   pulmonary exam normal                OUTSIDE LABS:  CBC:   Lab Results   Component Value Date    WBC 5.7 11/01/2024    WBC 5.0 11/16/2023    HGB 13.6 11/01/2024    HGB 14.6 11/16/2023    HCT 42.3 11/01/2024    HCT 43.9 11/16/2023     11/01/2024     11/16/2023     BMP:   Lab Results   Component Value Date     11/01/2024     11/16/2023    POTASSIUM 4.1 11/01/2024    POTASSIUM 4.1 11/16/2023    CHLORIDE 103 11/01/2024    CHLORIDE 99 11/16/2023    CO2 28 11/01/2024    CO2 30 (H) 11/16/2023    BUN 15.8 11/01/2024    BUN 14.7 11/16/2023    CR 0.90 11/01/2024    CR 1.15 (H) 11/16/2023    GLC 95 11/01/2024     (H) 11/16/2023     COAGS:   Lab Results   Component Value Date    INR 1.3 (A) 08/01/2011     POC:   Lab Results   Component Value Date    BGM 93 11/19/2009     HEPATIC:   Lab Results   Component Value Date    ALBUMIN 4.4 11/01/2024    PROTTOTAL 6.9 11/01/2024    ALT 24 11/01/2024    AST 31 11/01/2024    ALKPHOS 129 11/01/2024    BILITOTAL 0.6 11/01/2024     OTHER:   Lab Results   Component Value Date    GABBIE 10.0 11/01/2024    MAG 2.3 06/27/2011    TSH 1.98 11/01/2024    T4  1.34 01/13/2020       Anesthesia Plan    ASA Status:  3       Anesthesia Type: General.              Consents    Anesthesia Plan(s) and associated risks, benefits, and realistic alternatives discussed. Questions answered and patient/representative(s) expressed understanding.     - Discussed: Risks, Benefits and Alternatives for BOTH SEDATION and the PROCEDURE were discussed     - Discussed with:  Patient            Postoperative Care       PONV prophylaxis: Background Propofol Infusion     Comments:               KAY Sampson CRNA    I have reviewed the pertinent notes and labs in the chart from the past 30 days and (re)examined the patient.  Any updates or changes from those notes are reflected in this note.    Clinically Significant Risk Factors Present on Admission                   # Hypertension: Noted on problem list

## 2025-03-11 ENCOUNTER — HOSPITAL ENCOUNTER (OUTPATIENT)
Facility: CLINIC | Age: 65
Discharge: HOME | End: 2025-03-11
Attending: STUDENT IN AN ORGANIZED HEALTH CARE EDUCATION/TRAINING PROGRAM | Admitting: STUDENT IN AN ORGANIZED HEALTH CARE EDUCATION/TRAINING PROGRAM
Payer: COMMERCIAL

## 2025-03-11 ENCOUNTER — ANESTHESIA (OUTPATIENT)
Dept: GASTROENTEROLOGY | Facility: CLINIC | Age: 65
End: 2025-03-11
Payer: COMMERCIAL

## 2025-03-11 VITALS
SYSTOLIC BLOOD PRESSURE: 112 MMHG | BODY MASS INDEX: 30.57 KG/M2 | HEIGHT: 69 IN | DIASTOLIC BLOOD PRESSURE: 70 MMHG | OXYGEN SATURATION: 95 % | TEMPERATURE: 97.3 F | RESPIRATION RATE: 14 BRPM | HEART RATE: 72 BPM

## 2025-03-11 LAB — COLONOSCOPY: NORMAL

## 2025-03-11 PROCEDURE — 258N000003 HC RX IP 258 OP 636: Performed by: PHYSICIAN ASSISTANT

## 2025-03-11 PROCEDURE — 250N000009 HC RX 250: Performed by: NURSE ANESTHETIST, CERTIFIED REGISTERED

## 2025-03-11 PROCEDURE — 45380 COLONOSCOPY AND BIOPSY: CPT | Mod: PT,XU | Performed by: STUDENT IN AN ORGANIZED HEALTH CARE EDUCATION/TRAINING PROGRAM

## 2025-03-11 PROCEDURE — 45385 COLONOSCOPY W/LESION REMOVAL: CPT | Performed by: STUDENT IN AN ORGANIZED HEALTH CARE EDUCATION/TRAINING PROGRAM

## 2025-03-11 PROCEDURE — 250N000011 HC RX IP 250 OP 636: Performed by: STUDENT IN AN ORGANIZED HEALTH CARE EDUCATION/TRAINING PROGRAM

## 2025-03-11 PROCEDURE — 250N000011 HC RX IP 250 OP 636: Performed by: NURSE ANESTHETIST, CERTIFIED REGISTERED

## 2025-03-11 PROCEDURE — 370N000017 HC ANESTHESIA TECHNICAL FEE, PER MIN: Performed by: STUDENT IN AN ORGANIZED HEALTH CARE EDUCATION/TRAINING PROGRAM

## 2025-03-11 PROCEDURE — 250N000009 HC RX 250: Performed by: PHYSICIAN ASSISTANT

## 2025-03-11 PROCEDURE — 88305 TISSUE EXAM BY PATHOLOGIST: CPT | Mod: TC | Performed by: STUDENT IN AN ORGANIZED HEALTH CARE EDUCATION/TRAINING PROGRAM

## 2025-03-11 RX ORDER — SODIUM CHLORIDE, SODIUM LACTATE, POTASSIUM CHLORIDE, CALCIUM CHLORIDE 600; 310; 30; 20 MG/100ML; MG/100ML; MG/100ML; MG/100ML
INJECTION, SOLUTION INTRAVENOUS CONTINUOUS
Status: DISCONTINUED | OUTPATIENT
Start: 2025-03-11 | End: 2025-03-11 | Stop reason: HOSPADM

## 2025-03-11 RX ORDER — LIDOCAINE HYDROCHLORIDE 20 MG/ML
INJECTION, SOLUTION INFILTRATION; PERINEURAL PRN
Status: DISCONTINUED | OUTPATIENT
Start: 2025-03-11 | End: 2025-03-11

## 2025-03-11 RX ORDER — LIDOCAINE 40 MG/G
CREAM TOPICAL
Status: DISCONTINUED | OUTPATIENT
Start: 2025-03-11 | End: 2025-03-11 | Stop reason: HOSPADM

## 2025-03-11 RX ORDER — ONDANSETRON 2 MG/ML
4 INJECTION INTRAMUSCULAR; INTRAVENOUS EVERY 6 HOURS PRN
Status: DISCONTINUED | OUTPATIENT
Start: 2025-03-11 | End: 2025-03-11 | Stop reason: HOSPADM

## 2025-03-11 RX ORDER — GLYCOPYRROLATE 0.2 MG/ML
INJECTION, SOLUTION INTRAMUSCULAR; INTRAVENOUS PRN
Status: DISCONTINUED | OUTPATIENT
Start: 2025-03-11 | End: 2025-03-11

## 2025-03-11 RX ORDER — PROPOFOL 10 MG/ML
INJECTION, EMULSION INTRAVENOUS CONTINUOUS PRN
Status: DISCONTINUED | OUTPATIENT
Start: 2025-03-11 | End: 2025-03-11

## 2025-03-11 RX ORDER — KETAMINE HYDROCHLORIDE 10 MG/ML
INJECTION INTRAMUSCULAR; INTRAVENOUS PRN
Status: DISCONTINUED | OUTPATIENT
Start: 2025-03-11 | End: 2025-03-11

## 2025-03-11 RX ADMIN — ONDANSETRON 4 MG: 2 INJECTION INTRAMUSCULAR; INTRAVENOUS at 11:31

## 2025-03-11 RX ADMIN — GLYCOPYRROLATE 0.1 MG: 0.2 INJECTION, SOLUTION INTRAMUSCULAR; INTRAVENOUS at 11:56

## 2025-03-11 RX ADMIN — PROPOFOL 150 MCG/KG/MIN: 10 INJECTION, EMULSION INTRAVENOUS at 11:48

## 2025-03-11 RX ADMIN — KETAMINE HYDROCHLORIDE 50 MG: 10 INJECTION INTRAMUSCULAR; INTRAVENOUS at 11:57

## 2025-03-11 RX ADMIN — GLYCOPYRROLATE 0.2 MG: 0.2 INJECTION, SOLUTION INTRAMUSCULAR; INTRAVENOUS at 11:46

## 2025-03-11 RX ADMIN — LIDOCAINE HYDROCHLORIDE 0.1 ML: 10 INJECTION, SOLUTION EPIDURAL; INFILTRATION; INTRACAUDAL; PERINEURAL at 11:28

## 2025-03-11 RX ADMIN — SODIUM CHLORIDE, POTASSIUM CHLORIDE, SODIUM LACTATE AND CALCIUM CHLORIDE 1000 ML: 600; 310; 30; 20 INJECTION, SOLUTION INTRAVENOUS at 11:28

## 2025-03-11 RX ADMIN — LIDOCAINE HYDROCHLORIDE 100 MG: 20 INJECTION, SOLUTION INFILTRATION; PERINEURAL at 11:57

## 2025-03-11 ASSESSMENT — ACTIVITIES OF DAILY LIVING (ADL)
ADLS_ACUITY_SCORE: 41

## 2025-03-11 NOTE — LETTER
Stephanie Trejo  [address] [facility] [address]      March 14, 2025    Dear Stephanie,  This letter is written to inform you of the results of your recent colonoscopy.  Your examination showed diverticulosis of your colon in sigmoid colon and polyp(s) in your sigmoid colon. All polyps were removed in their entirety and sent for review by a pathologist. As you will see on the pathology report below, the tissue(s) were actually lymphoid aggregates. Your examination also showed nodular mucosa which was biopsied and was normal without pathology.    Diverticulosis can be described as small outpouchings (pockets) in your colon wall. This is an entirely benign (non-cancerous) finding.  Lymphoid aggregates are collections of normal immune cells. This is a benign (non-cancerous) finding.    Given these findings and your family history of colon cancer, I recommend that you undergo a repeat colonoscopy in 5 years for screening. We will enter you into a recall system so you receive a reminder closer to the time that you are due for repeat examination. Your physician also recommends that you adhere to a high fiber diet indefinitely to promote colon health.     Please remember that this recommendation is made with the understanding that you are not experiencing persistent changes in bowel function, bleeding per rectum, and/or significant abdominal pain. If you experience these symptoms, please contact your primary care provider for a further evaluation.     If you have any questions or concerns about the results of your colonoscopy or the appropriate follow-up, please contact my assistant at [phone]    Sincerely,      Melo Concepcion MD   [facility]  ___                    Resulted Orders   Surgical Pathology Exam   Result Value Ref Range    Case Report       Surgical Pathology Report                         Case: [id number]                                  Authorizing Provider:  Melo Concepcion MD        Collected:           03/11/2025 12:09 PM          Ordering Location:     [facility]   Received:            03/12/2025 07:29 AM                                 [place]                                                                      Pathologist:           Zaire Llamas MD                                                          Specimens:   A) - Large Intestine, Colon, Ascending, ascending biopsy                                            B) - Large Intestine, Colon, Sigmoid, sigmoid polyp                                        Final Diagnosis       A(1). Colon, ascending, biopsies:  - Colonic mucosa with no significant pathological changes.  - No features of an acute, chronic or microscopic colitis identified.  - Negative for dysplasia or malignancy.      B(2).   Colon, Sigmoid, polyp, polypectomy:  -Polypoid colonic mucosa with underlying reactive lymphoid aggregate  -Negative for dysplasia or malignancy          Clinical Information       Procedure:  COLONOSCOPY, WITH POLYPECTOMY AND BIOPSY  COLONOSCOPY, FLEXIBLE, WITH LESION REMOVAL USING SNARE  Pre-op Diagnosis: Special screening for malignant neoplasms, colon [Z12.11]  Post-op Diagnosis: Z12.11 - Special screening for malignant neoplasms, colon [ICD-10-CM]      Gross Description       A(1). Large Intestine, Colon, Ascending, ascending biopsy:  The specimen is received in formalin, labeled with the patient's name, medical record number and other identifying information and designated  ascending biopsy . It consists of a single tan soft tissue fragment measuring 0.4 cm. Entirely submitted in one cassette.    B(2). Large Intestine, Colon, Sigmoid, sigmoid polyp:  The specimen is received in formalin, labeled with the patient's name, medical record number and other identifying information and designated  sigmoid colon polyp . It consists of 2 tan soft tissue fragments ranging from 0.2-0.3 cm. Entirely submitted in one cassette.   Lorna  ANYI Domingo(ASCP) 3/12/2025 7:59 AM       Microscopic Description       Microscopic examination was performed.      Performing Labs       The technical component of this testing was completed at [facility].    Stain controls for all stains resulted within this report have been reviewed and show appropriate reactivity.       Case Images

## 2025-03-11 NOTE — ANESTHESIA POSTPROCEDURE EVALUATION
Patient: Stephanie Trejo    Procedure: Procedure(s):  COLONOSCOPY, WITH POLYPECTOMY AND BIOPSY       Anesthesia Type:  General    Note:  Disposition: Outpatient   Postop Pain Control: Uneventful            Sign Out: Well controlled pain   PONV: No   Neuro/Psych: Uneventful            Sign Out: Acceptable/Baseline neuro status   Airway/Respiratory: Uneventful            Sign Out: Acceptable/Baseline resp. status   CV/Hemodynamics: Uneventful            Sign Out: Acceptable CV status; No obvious hypovolemia; No obvious fluid overload   Other NRE: NONE   DID A NON-ROUTINE EVENT OCCUR? No           Last vitals:  Vitals Value Taken Time   /82 03/11/25 1245   Temp     Pulse 71 03/11/25 1245   Resp 14 03/11/25 1230   SpO2 96 % 03/11/25 1246   Vitals shown include unfiled device data.    Electronically Signed By: AKY Ray CRNA  March 11, 2025  12:52 PM

## 2025-03-11 NOTE — ANESTHESIA CARE TRANSFER NOTE
Patient: Stephanie Trejo    Procedure: Procedure(s):  COLONOSCOPY, WITH POLYPECTOMY AND BIOPSY       Diagnosis: Special screening for malignant neoplasms, colon [Z12.11]  Diagnosis Additional Information: No value filed.    Anesthesia Type:   General     Note:    Oropharynx: oropharynx clear of all foreign objects and spontaneously breathing  Level of Consciousness: awake  Oxygen Supplementation: room air    Independent Airway: airway patency satisfactory and stable  Dentition: dentition unchanged  Vital Signs Stable: post-procedure vital signs reviewed and stable  Report to RN Given: handoff report given  Patient transferred to: Phase II    Handoff Report: Identifed the Patient, Identified the Reponsible Provider, Reviewed the pertinent medical history, Discussed the surgical course, Reviewed Intra-OP anesthesia mangement and issues during anesthesia, Set expectations for post-procedure period and Allowed opportunity for questions and acknowledgement of understanding      Vitals:  Vitals Value Taken Time   BP     Temp     Pulse     Resp     SpO2         Electronically Signed By: KAY Ray CRNA  March 11, 2025  12:19 PM   Grossly Intact

## 2025-03-11 NOTE — H&P
[facility]    Pre-Endoscopy History and Physical     Stephanie Trejo MRN# [id number]   YOB: 1960 Age: 64 year old     Date of Procedure: 3/11/2025  Primary care provider: Radha Felton  Type of Endoscopy: Colonoscopy with possible biopsy, possible polypectomy  Reason for Procedure: Screening, high-risk patient  Type of Anesthesia Anticipated: Conscious Sedation    HPI:    Stephanie is a 64 year old female who will be undergoing the above procedure.      A history and physical has been performed. The patient's medications and allergies have been reviewed. The risks and benefits of the procedure and the sedation options and risks were discussed with the patient.  All questions were answered and informed consent was obtained.      She denies a personal or family history of anesthesia complications or bleeding disorders. Denies blood thinners.    Last colonoscopy , demonstrated no abnormalities. Endorses family history of colon cancer, mother with colon CA. Surgical history significant for  section, diagnostic laparoscopy, and hernia repair.    Denies nausea, emesis, abdominal pain, diarrhea, constipation, hematochezia, melena.    Patient Active Problem List   Diagnosis    Hypothyroidism    Essential hypertension    CAMELIA (obstructive sleep apnea)    Mitral valve regurgitation    Abnormal uterine bleeding    Mild major depression    Counseling regarding advanced directives    Anxiety    Chronic kidney disease, stage 3a (H)        Past Medical History:   Diagnosis Date    CHF (congestive heart failure) (H) 2013    Depressive disorder     Hypertension     Mitral valve insufficiency, acquired     Other and unspecified disc disorder of lumbar region     Scoliosis (and kyphoscoliosis), idiopathic     Undiagnosed cardiac murmurs     Unspecified hypothyroidism         Past Surgical History:   Procedure Laterality Date    ABDOMEN SURGERY          BACK  SURGERY      BIOPSY      COLONOSCOPY      COLONOSCOPY N/A 2020    Procedure: COLONOSCOPY;  Surgeon: Shaun Torrez MD;  Location: [facility]    ESOPHAGOSCOPY, GASTROSCOPY, DUODENOSCOPY (EGD), COMBINED N/A 2020    Procedure: ESOPHAGOGASTRODUODENOSCOPY, WITH BIOPSY;  Surgeon: Elsie Kincaid MD;  Location: [facility]    ESOPHAGOSCOPY, GASTROSCOPY, DUODENOSCOPY (EGD), COMBINED N/A 2023    Procedure: ESOPHAGOGASTRODUODENOSCOPY, WITH BIOPSY;  Surgeon: Jeb aWy DO;  Location: [facility]    EYE SURGERY  23    Cataract right eye    GYN SURGERY      HERNIA REPAIR      SOFT TISSUE SURGERY      SURGICAL HISTORY OF -   1985        SURGICAL HISTORY OF -       Laparoscopy    SURGICAL HISTORY OF -   1983    Hernia Repair     SURGICAL HISTORY OF -   1998    Back Fusion T3 - L3       Social History     Tobacco Use    Smoking status: Never    Smokeless tobacco: Never   Substance Use Topics    Alcohol use: No       Family History   Problem Relation Age of Onset    Lipids Mother     Thyroid Disease Mother     Eye Disorder Mother         macular degeneration    Colon Cancer Mother     Anesthesia Reaction Mother         allergic to some    Hypertension Mother     Cerebrovascular Disease Mother     Hypertension Father     Cardiovascular Father         MI    Osteoporosis Father     Alzheimer Disease Father     Genitourinary Problems Father         incontinence    Breast Cancer Maternal Grandmother     Cerebrovascular Disease Maternal Grandfather     Diabetes Paternal Grandmother     Gastrointestinal Disease Sister         colitis    Respiratory Daughter         asthma       Prior to Admission medications    Medication Sig Start Date End Date Taking? Authorizing Provider   ascorbic acid (VITAMIN C) 250 MG CHEW chewable tablet Take 250 mg by mouth daily    Reported, Patient   atorvastatin (LIPITOR) 20 MG tablet Take 1 tablet by mouth once daily 10/29/24   Radha Felton MD  "  cyclobenzaprine (FLEXERIL) 10 MG tablet Take 1 tablet (10 mg) by mouth 3 times daily as needed for muscle spasms 8/2/22   Radah Felton MD   DULoxetine (CYMBALTA) 30 MG capsule Take 1 capsule (30 mg) by mouth daily. 12/10/24   Radha Felton MD   hydrochlorothiazide (HYDRODIURIL) 25 MG tablet Take 1 tablet (25 mg) by mouth daily. 11/1/24   Radha Felton MD   ibuprofen (ADVIL/MOTRIN) 600 MG tablet  6/12/20   Reported, Patient   lactobacillus rhamnosus, GG, (CULTURELL) capsule Take 1 capsule by mouth 2 times daily. Mood+ digestive and immune system    Reported, Patient   levothyroxine (SYNTHROID/LEVOTHROID) 125 MCG tablet TAKE 1 TABLET BY MOUTH ONCE DAILY . 11/1/24   Radha Felton MD   losartan (COZAAR) 50 MG tablet Take 1 tablet (50 mg) by mouth daily. 11/1/24   Radha Felton MD   magnesium 250 MG tablet Take 1 tablet by mouth daily. Triple mag - unsure of dosage    Reported, Patient   pantoprazole (PROTONIX) 40 MG EC tablet TAKE 1 TABLET BY MOUTH ONCE DAILY TAKE  60  MINUTES  BEFORE  ANY  FOOD  OR  DRINK  INTAKE 11/1/24   Radha Felton MD   temazepam (RESTORIL) 15 MG capsule Take 1 capsule (15 mg) by mouth nightly as needed for sleep. 11/1/24   Radha Felton MD   venlafaxine (EFFEXOR XR) 37.5 MG 24 hr capsule Take 1 capsule (37.5 mg) by mouth daily. 12/10/24   Radha Felton MD   vitamin D3 (CHOLECALCIFEROL) 50 mcg (2000 units) tablet Take 1 tablet (50 mcg) by mouth daily 8/5/22   Radha Felton MD       Allergies   Allergen Reactions    Amlodipine Besylate Swelling     Swelling in ankles, Racing pulse also    Ammonia Hives and Swelling    Lisinopril      Dizziness and headaches    Penicillins Rash        REVIEW OF SYSTEMS:   5 point ROS negative except as noted above in HPI, including Gen., Resp., CV, GI &  system review.    PHYSICAL EXAM:   BP (P) 123/79   Temp (!) (P) 72  F (22.2  C)   Resp (P) 18   Ht (P) 1.753 m (5' 9\")   LMP " "09/21/2012   SpO2 (P) 96%   BMI (P) 30.57 kg/m   Estimated body mass index is 30.57 kg/m  (pended) as calculated from the following:    Height as of this encounter: (P) 1.753 m (5' 9\").    Weight as of 11/1/24: 93.9 kg (207 lb).   Constitutional: Awake, alert, no acute distress.  Eyes: No scleral icterus.  Conjunctiva are without injection.  ENMT: Mucous membranes moist, dentition and gums are intact.   Neck: Soft, supple, trachea midline.    Endocrine: n/a   Lymphatic: There is no cervical, submandibularadenopathy.  Respiratory: normal effortgs   Cardiovascular: S1, S2  Abdomen: Non-distended, non-tender,  No masses,  Musculoskeletal: No spinal or CVA tenderness. Full range of motion in the upper and lower extremities.    Skin: No skin rashes or lesions to inspection.  No petechia.    Neurologic: alerted and oriented 3x  Psychiatric: The patient's affect is not blunted and mood is appropriate.  DIAGNOSTICS:    Not indicated    IMPRESSION   ASA Class 2 - Mild systemic disease    PLAN:   Plan for Colonoscopy with possible biopsy, possible polypectomy. We discussed the risks, benefits and alternatives and the patient wished to proceed.  Patient is cleared for the above procedure.    The above has been forwarded to the consulting provider.    The above has been discussed with attending physician. Any changes will be made in the attending attestation statement/addendum.    Blanquita Fishman DO PGY-2  [facility]  [facility]  "

## 2025-03-14 PROCEDURE — 88305 TISSUE EXAM BY PATHOLOGIST: CPT | Mod: 26 | Performed by: PATHOLOGY

## 2025-04-30 ENCOUNTER — PATIENT OUTREACH (OUTPATIENT)
Dept: CARE COORDINATION | Facility: CLINIC | Age: 65
End: 2025-04-30
Payer: COMMERCIAL

## 2025-05-20 DIAGNOSIS — F51.04 PSYCHOPHYSIOLOGICAL INSOMNIA: ICD-10-CM

## 2025-05-20 DIAGNOSIS — F41.9 ANXIETY: ICD-10-CM

## 2025-05-20 RX ORDER — TEMAZEPAM 15 MG/1
15 CAPSULE ORAL
Qty: 90 CAPSULE | Refills: 0 | Status: SHIPPED | OUTPATIENT
Start: 2025-05-20

## 2025-05-27 ENCOUNTER — HOSPITAL ENCOUNTER (OUTPATIENT)
Dept: MAMMOGRAPHY | Facility: CLINIC | Age: 65
Discharge: HOME | End: 2025-05-27
Attending: FAMILY MEDICINE
Payer: COMMERCIAL

## 2025-05-27 DIAGNOSIS — Z12.31 BREAST CANCER SCREENING BY MAMMOGRAM: ICD-10-CM

## 2025-05-27 PROCEDURE — 77067 SCR MAMMO BI INCL CAD: CPT

## 2025-06-03 DIAGNOSIS — F32.0 MILD MAJOR DEPRESSION: ICD-10-CM

## 2025-06-03 RX ORDER — VENLAFAXINE HYDROCHLORIDE 37.5 MG/1
37.5 CAPSULE, EXTENDED RELEASE ORAL DAILY
Qty: 90 CAPSULE | Refills: 1 | Status: SHIPPED | OUTPATIENT
Start: 2025-06-03

## 2025-06-03 ASSESSMENT — PATIENT HEALTH QUESTIONNAIRE - PHQ9
SUM OF ALL RESPONSES TO PHQ QUESTIONS 1-9: 4
SUM OF ALL RESPONSES TO PHQ QUESTIONS 1-9: 4
10. IF YOU CHECKED OFF ANY PROBLEMS, HOW DIFFICULT HAVE THESE PROBLEMS MADE IT FOR YOU TO DO YOUR WORK, TAKE CARE OF THINGS AT HOME, OR GET ALONG WITH OTHER PEOPLE: NOT DIFFICULT AT ALL

## 2025-08-18 DIAGNOSIS — F51.04 PSYCHOPHYSIOLOGICAL INSOMNIA: ICD-10-CM

## 2025-08-18 DIAGNOSIS — F41.9 ANXIETY: ICD-10-CM

## 2025-08-18 RX ORDER — TEMAZEPAM 15 MG/1
15 CAPSULE ORAL
Qty: 90 CAPSULE | Refills: 0 | Status: SHIPPED | OUTPATIENT
Start: 2025-08-18

## (undated) DEVICE — ENDO FORCEP ENDOJAW BIOPSY 2.8MMX230CM FB-220U

## (undated) RX ORDER — ONDANSETRON 2 MG/ML
INJECTION INTRAMUSCULAR; INTRAVENOUS
Status: DISPENSED
Start: 2025-03-11

## (undated) RX ORDER — LIDOCAINE HYDROCHLORIDE 10 MG/ML
INJECTION, SOLUTION EPIDURAL; INFILTRATION; INTRACAUDAL; PERINEURAL
Status: DISPENSED
Start: 2020-11-16

## (undated) RX ORDER — PROPOFOL 10 MG/ML
INJECTION, EMULSION INTRAVENOUS
Status: DISPENSED
Start: 2023-12-13

## (undated) RX ORDER — GLYCOPYRROLATE 0.2 MG/ML
INJECTION, SOLUTION INTRAMUSCULAR; INTRAVENOUS
Status: DISPENSED
Start: 2020-11-16

## (undated) RX ORDER — LIDOCAINE HYDROCHLORIDE 10 MG/ML
INJECTION, SOLUTION EPIDURAL; INFILTRATION; INTRACAUDAL; PERINEURAL
Status: DISPENSED
Start: 2025-03-11

## (undated) RX ORDER — GLYCOPYRROLATE 0.2 MG/ML
INJECTION, SOLUTION INTRAMUSCULAR; INTRAVENOUS
Status: DISPENSED
Start: 2025-03-11